# Patient Record
Sex: FEMALE | Race: WHITE | NOT HISPANIC OR LATINO
[De-identification: names, ages, dates, MRNs, and addresses within clinical notes are randomized per-mention and may not be internally consistent; named-entity substitution may affect disease eponyms.]

---

## 2017-01-12 ENCOUNTER — MEDICATION RENEWAL (OUTPATIENT)
Age: 47
End: 2017-01-12

## 2017-01-12 ENCOUNTER — RX RENEWAL (OUTPATIENT)
Age: 47
End: 2017-01-12

## 2017-01-17 ENCOUNTER — RX RENEWAL (OUTPATIENT)
Age: 47
End: 2017-01-17

## 2017-01-30 ENCOUNTER — APPOINTMENT (OUTPATIENT)
Dept: BARIATRICS/WEIGHT MGMT | Facility: CLINIC | Age: 47
End: 2017-01-30

## 2017-01-30 VITALS
HEART RATE: 114 BPM | WEIGHT: 293 LBS | SYSTOLIC BLOOD PRESSURE: 134 MMHG | DIASTOLIC BLOOD PRESSURE: 80 MMHG | HEIGHT: 66 IN | BODY MASS INDEX: 47.09 KG/M2

## 2017-02-13 ENCOUNTER — APPOINTMENT (OUTPATIENT)
Dept: SURGERY | Facility: CLINIC | Age: 47
End: 2017-02-13

## 2017-02-13 VITALS — WEIGHT: 293 LBS | HEIGHT: 64 IN | BODY MASS INDEX: 50.02 KG/M2

## 2017-03-01 ENCOUNTER — APPOINTMENT (OUTPATIENT)
Dept: BARIATRICS/WEIGHT MGMT | Facility: CLINIC | Age: 47
End: 2017-03-01

## 2017-03-01 VITALS
DIASTOLIC BLOOD PRESSURE: 80 MMHG | WEIGHT: 293 LBS | BODY MASS INDEX: 50.02 KG/M2 | HEIGHT: 64 IN | SYSTOLIC BLOOD PRESSURE: 141 MMHG | TEMPERATURE: 92 F

## 2017-03-16 ENCOUNTER — RX RENEWAL (OUTPATIENT)
Age: 47
End: 2017-03-16

## 2017-03-23 ENCOUNTER — LABORATORY RESULT (OUTPATIENT)
Age: 47
End: 2017-03-23

## 2017-03-23 ENCOUNTER — NON-APPOINTMENT (OUTPATIENT)
Age: 47
End: 2017-03-23

## 2017-03-23 ENCOUNTER — APPOINTMENT (OUTPATIENT)
Dept: INTERNAL MEDICINE | Facility: CLINIC | Age: 47
End: 2017-03-23

## 2017-03-23 ENCOUNTER — APPOINTMENT (OUTPATIENT)
Dept: CARDIOLOGY | Facility: CLINIC | Age: 47
End: 2017-03-23

## 2017-03-23 VITALS
DIASTOLIC BLOOD PRESSURE: 86 MMHG | OXYGEN SATURATION: 95 % | SYSTOLIC BLOOD PRESSURE: 136 MMHG | HEIGHT: 64 IN | WEIGHT: 293 LBS | RESPIRATION RATE: 16 BRPM | TEMPERATURE: 98 F | HEART RATE: 115 BPM | BODY MASS INDEX: 50.02 KG/M2

## 2017-03-24 LAB
ALBUMIN SERPL ELPH-MCNC: 3.8 G/DL
ALP BLD-CCNC: 89 U/L
ALT SERPL-CCNC: 21 U/L
ANION GAP SERPL CALC-SCNC: 13 MMOL/L
AST SERPL-CCNC: 17 U/L
BASOPHILS # BLD AUTO: 0.07 K/UL
BASOPHILS NFR BLD AUTO: 0.9 %
BILIRUB SERPL-MCNC: 0.2 MG/DL
BUN SERPL-MCNC: 13 MG/DL
CALCIUM SERPL-MCNC: 8.6 MG/DL
CHLORIDE SERPL-SCNC: 104 MMOL/L
CO2 SERPL-SCNC: 26 MMOL/L
CREAT SERPL-MCNC: 0.5 MG/DL
EOSINOPHIL # BLD AUTO: 0.59 K/UL
EOSINOPHIL NFR BLD AUTO: 7.3 %
GLUCOSE SERPL-MCNC: 104 MG/DL
HBA1C MFR BLD HPLC: 4.9 %
HCT VFR BLD CALC: 37.7 %
HGB BLD-MCNC: 10.5 G/DL
LYMPHOCYTES # BLD AUTO: 0.74 K/UL
LYMPHOCYTES NFR BLD AUTO: 9.1 %
MAN DIFF?: NORMAL
MCHC RBC-ENTMCNC: 24.4 PG
MCHC RBC-ENTMCNC: 27.9 GM/DL
MCV RBC AUTO: 87.5 FL
MONOCYTES # BLD AUTO: 0.45 K/UL
MONOCYTES NFR BLD AUTO: 5.5 %
NEUTROPHILS # BLD AUTO: 6.28 K/UL
NEUTROPHILS NFR BLD AUTO: 77.2 %
PLATELET # BLD AUTO: 279 K/UL
POTASSIUM SERPL-SCNC: 4.2 MMOL/L
PROT SERPL-MCNC: 7.8 G/DL
RBC # BLD: 4.31 M/UL
RBC # FLD: 16.7 %
SODIUM SERPL-SCNC: 143 MMOL/L
WBC # FLD AUTO: 8.14 K/UL

## 2017-03-29 ENCOUNTER — APPOINTMENT (OUTPATIENT)
Dept: INTERNAL MEDICINE | Facility: CLINIC | Age: 47
End: 2017-03-29

## 2017-04-03 ENCOUNTER — APPOINTMENT (OUTPATIENT)
Dept: BARIATRICS/WEIGHT MGMT | Facility: CLINIC | Age: 47
End: 2017-04-03

## 2017-04-03 VITALS
DIASTOLIC BLOOD PRESSURE: 74 MMHG | HEART RATE: 109 BPM | WEIGHT: 293 LBS | HEIGHT: 64 IN | BODY MASS INDEX: 50.02 KG/M2 | SYSTOLIC BLOOD PRESSURE: 133 MMHG

## 2017-04-04 ENCOUNTER — APPOINTMENT (OUTPATIENT)
Dept: CV DIAGNOSITCS | Facility: HOSPITAL | Age: 47
End: 2017-04-04

## 2017-04-04 ENCOUNTER — OUTPATIENT (OUTPATIENT)
Dept: OUTPATIENT SERVICES | Facility: HOSPITAL | Age: 47
LOS: 1 days | End: 2017-04-04

## 2017-04-04 DIAGNOSIS — I50.9 HEART FAILURE, UNSPECIFIED: ICD-10-CM

## 2017-04-04 DIAGNOSIS — E11.9 TYPE 2 DIABETES MELLITUS WITHOUT COMPLICATIONS: ICD-10-CM

## 2017-04-04 LAB — HCG UR QL: NEGATIVE — SIGNIFICANT CHANGE UP

## 2017-04-17 ENCOUNTER — FORM ENCOUNTER (OUTPATIENT)
Age: 47
End: 2017-04-17

## 2017-04-18 ENCOUNTER — APPOINTMENT (OUTPATIENT)
Dept: ULTRASOUND IMAGING | Facility: HOSPITAL | Age: 47
End: 2017-04-18

## 2017-04-18 ENCOUNTER — OUTPATIENT (OUTPATIENT)
Dept: OUTPATIENT SERVICES | Facility: HOSPITAL | Age: 47
LOS: 1 days | End: 2017-04-18
Payer: MEDICARE

## 2017-04-18 ENCOUNTER — APPOINTMENT (OUTPATIENT)
Dept: RADIOLOGY | Facility: HOSPITAL | Age: 47
End: 2017-04-18

## 2017-04-18 DIAGNOSIS — R10.9 UNSPECIFIED ABDOMINAL PAIN: ICD-10-CM

## 2017-04-18 DIAGNOSIS — K44.0 DIAPHRAGMATIC HERNIA WITH OBSTRUCTION, WITHOUT GANGRENE: ICD-10-CM

## 2017-04-18 DIAGNOSIS — K80.80 OTHER CHOLELITHIASIS WITHOUT OBSTRUCTION: ICD-10-CM

## 2017-04-18 PROCEDURE — 76700 US EXAM ABDOM COMPLETE: CPT

## 2017-04-18 PROCEDURE — 74241: CPT

## 2017-04-24 ENCOUNTER — MEDICATION RENEWAL (OUTPATIENT)
Age: 47
End: 2017-04-24

## 2017-05-01 ENCOUNTER — MEDICATION RENEWAL (OUTPATIENT)
Age: 47
End: 2017-05-01

## 2017-05-03 ENCOUNTER — APPOINTMENT (OUTPATIENT)
Dept: BARIATRICS/WEIGHT MGMT | Facility: CLINIC | Age: 47
End: 2017-05-03

## 2017-05-03 VITALS — BODY MASS INDEX: 66.77 KG/M2 | WEIGHT: 293 LBS

## 2017-05-03 VITALS — DIASTOLIC BLOOD PRESSURE: 80 MMHG | SYSTOLIC BLOOD PRESSURE: 104 MMHG

## 2017-05-12 ENCOUNTER — APPOINTMENT (OUTPATIENT)
Dept: INTERNAL MEDICINE | Facility: CLINIC | Age: 47
End: 2017-05-12

## 2017-05-12 VITALS
SYSTOLIC BLOOD PRESSURE: 151 MMHG | DIASTOLIC BLOOD PRESSURE: 87 MMHG | TEMPERATURE: 98 F | HEART RATE: 115 BPM | HEIGHT: 64 IN | BODY MASS INDEX: 50.02 KG/M2 | WEIGHT: 293 LBS | RESPIRATION RATE: 13 BRPM | OXYGEN SATURATION: 97 %

## 2017-05-12 DIAGNOSIS — Z82.49 FAMILY HISTORY OF ISCHEMIC HEART DISEASE AND OTHER DISEASES OF THE CIRCULATORY SYSTEM: ICD-10-CM

## 2017-05-12 DIAGNOSIS — Z87.42 PERSONAL HISTORY OF OTHER DISEASES OF THE FEMALE GENITAL TRACT: ICD-10-CM

## 2017-05-12 RX ORDER — LEVOFLOXACIN 250 MG/1
250 TABLET, FILM COATED ORAL
Qty: 5 | Refills: 0 | Status: COMPLETED | COMMUNITY
Start: 2017-01-03

## 2017-05-19 ENCOUNTER — MEDICATION RENEWAL (OUTPATIENT)
Age: 47
End: 2017-05-19

## 2017-05-22 ENCOUNTER — APPOINTMENT (OUTPATIENT)
Dept: SURGERY | Facility: CLINIC | Age: 47
End: 2017-05-22

## 2017-05-22 PROBLEM — Z82.49 FAMILY HISTORY OF CARDIAC DISORDER: Status: ACTIVE | Noted: 2017-05-12

## 2017-05-22 PROBLEM — Z87.42 HISTORY OF OVARIAN CYST: Status: RESOLVED | Noted: 2017-05-12 | Resolved: 2017-05-22

## 2017-05-25 ENCOUNTER — OUTPATIENT (OUTPATIENT)
Dept: OUTPATIENT SERVICES | Facility: HOSPITAL | Age: 47
LOS: 1 days | Discharge: HOME | End: 2017-05-25

## 2017-05-25 DIAGNOSIS — E66.01 MORBID (SEVERE) OBESITY DUE TO EXCESS CALORIES: ICD-10-CM

## 2017-05-25 LAB — HCG UR QL: NEGATIVE — SIGNIFICANT CHANGE UP

## 2017-06-02 ENCOUNTER — LABORATORY RESULT (OUTPATIENT)
Age: 47
End: 2017-06-02

## 2017-06-06 ENCOUNTER — APPOINTMENT (OUTPATIENT)
Dept: BARIATRICS/WEIGHT MGMT | Facility: CLINIC | Age: 47
End: 2017-06-06

## 2017-06-12 ENCOUNTER — LABORATORY RESULT (OUTPATIENT)
Age: 47
End: 2017-06-12

## 2017-06-22 ENCOUNTER — LABORATORY RESULT (OUTPATIENT)
Age: 47
End: 2017-06-22

## 2017-06-29 ENCOUNTER — LABORATORY RESULT (OUTPATIENT)
Age: 47
End: 2017-06-29

## 2017-07-06 ENCOUNTER — OUTPATIENT (OUTPATIENT)
Dept: OUTPATIENT SERVICES | Facility: HOSPITAL | Age: 47
LOS: 1 days | End: 2017-07-06
Payer: MEDICARE

## 2017-07-06 ENCOUNTER — RESULT REVIEW (OUTPATIENT)
Age: 47
End: 2017-07-06

## 2017-07-06 DIAGNOSIS — E66.01 MORBID (SEVERE) OBESITY DUE TO EXCESS CALORIES: ICD-10-CM

## 2017-07-06 DIAGNOSIS — R12 HEARTBURN: ICD-10-CM

## 2017-07-06 PROCEDURE — 88305 TISSUE EXAM BY PATHOLOGIST: CPT | Mod: 26

## 2017-07-06 PROCEDURE — 88305 TISSUE EXAM BY PATHOLOGIST: CPT

## 2017-07-06 PROCEDURE — 88312 SPECIAL STAINS GROUP 1: CPT | Mod: 26

## 2017-07-06 PROCEDURE — 88312 SPECIAL STAINS GROUP 1: CPT

## 2017-07-06 PROCEDURE — 43239 EGD BIOPSY SINGLE/MULTIPLE: CPT

## 2017-07-10 ENCOUNTER — APPOINTMENT (OUTPATIENT)
Dept: BARIATRICS/WEIGHT MGMT | Facility: CLINIC | Age: 47
End: 2017-07-10

## 2017-07-10 VITALS
HEART RATE: 114 BPM | BODY MASS INDEX: 50.02 KG/M2 | WEIGHT: 293 LBS | HEIGHT: 64 IN | DIASTOLIC BLOOD PRESSURE: 80 MMHG | SYSTOLIC BLOOD PRESSURE: 143 MMHG

## 2017-07-11 ENCOUNTER — LABORATORY RESULT (OUTPATIENT)
Age: 47
End: 2017-07-11

## 2017-07-11 LAB — SURGICAL PATHOLOGY STUDY: SIGNIFICANT CHANGE UP

## 2017-07-14 ENCOUNTER — LABORATORY RESULT (OUTPATIENT)
Age: 47
End: 2017-07-14

## 2017-07-14 ENCOUNTER — APPOINTMENT (OUTPATIENT)
Dept: INTERNAL MEDICINE | Facility: CLINIC | Age: 47
End: 2017-07-14

## 2017-07-14 VITALS
OXYGEN SATURATION: 97 % | DIASTOLIC BLOOD PRESSURE: 79 MMHG | HEIGHT: 64 IN | HEART RATE: 102 BPM | SYSTOLIC BLOOD PRESSURE: 129 MMHG | TEMPERATURE: 98.6 F | RESPIRATION RATE: 14 BRPM

## 2017-07-14 DIAGNOSIS — M54.5 LOW BACK PAIN: ICD-10-CM

## 2017-07-14 DIAGNOSIS — M54.12 RADICULOPATHY, CERVICAL REGION: ICD-10-CM

## 2017-07-19 LAB
25(OH)D3 SERPL-MCNC: 30 NG/ML
ALBUMIN SERPL ELPH-MCNC: 4.1 G/DL
ALP BLD-CCNC: 95 U/L
ALT SERPL-CCNC: 18 U/L
ANION GAP SERPL CALC-SCNC: 16 MMOL/L
AST SERPL-CCNC: 21 U/L
BASOPHILS # BLD AUTO: 0.1 K/UL
BASOPHILS NFR BLD AUTO: 0.9 %
BILIRUB SERPL-MCNC: 0.2 MG/DL
BUN SERPL-MCNC: 13 MG/DL
CALCIUM SERPL-MCNC: 9 MG/DL
CHLORIDE SERPL-SCNC: 105 MMOL/L
CHOLEST SERPL-MCNC: 132 MG/DL
CHOLEST/HDLC SERPL: 3.1 RATIO
CO2 SERPL-SCNC: 23 MMOL/L
CREAT SERPL-MCNC: 0.66 MG/DL
CREAT SPEC-SCNC: 156 MG/DL
EOSINOPHIL # BLD AUTO: 0.39 K/UL
EOSINOPHIL NFR BLD AUTO: 3.6 %
FOLATE SERPL-MCNC: 11 NG/ML
FRUCTOSAMINE SERPL-MCNC: 210 UMOL/L
GLUCOSE SERPL-MCNC: 87 MG/DL
HBA1C MFR BLD HPLC: 5.2 %
HCT VFR BLD CALC: 38.3 %
HDLC SERPL-MCNC: 43 MG/DL
HGB BLD-MCNC: 10.7 G/DL
IRON SATN MFR SERPL: 12 %
IRON SERPL-MCNC: 37 UG/DL
LDLC SERPL CALC-MCNC: 71 MG/DL
LYMPHOCYTES # BLD AUTO: 1.25 K/UL
LYMPHOCYTES NFR BLD AUTO: 11.6 %
MAN DIFF?: NORMAL
MCHC RBC-ENTMCNC: 24.6 PG
MCHC RBC-ENTMCNC: 27.9 GM/DL
MCV RBC AUTO: 88 FL
MICROALBUMIN 24H UR DL<=1MG/L-MCNC: 2.6 MG/DL
MICROALBUMIN/CREAT 24H UR-RTO: 17 MG/G
MONOCYTES # BLD AUTO: 0.68 K/UL
MONOCYTES NFR BLD AUTO: 6.3 %
NEUTROPHILS # BLD AUTO: 8.34 K/UL
NEUTROPHILS NFR BLD AUTO: 76.7 %
PLATELET # BLD AUTO: 329 K/UL
POTASSIUM SERPL-SCNC: 4.6 MMOL/L
PROT SERPL-MCNC: 8.3 G/DL
RBC # BLD: 4.35 M/UL
RBC # FLD: 19.7 %
SODIUM SERPL-SCNC: 144 MMOL/L
TIBC SERPL-MCNC: 319 UG/DL
TRIGL SERPL-MCNC: 88 MG/DL
TSH SERPL-ACNC: 1.62 UIU/ML
UIBC SERPL-MCNC: 282 UG/DL
VIT B12 SERPL-MCNC: 1018 PG/ML
WBC # FLD AUTO: 10.75 K/UL

## 2017-07-21 LAB
INR PPP: 1.6 RATIO
PT BLD: 18.3 SEC

## 2017-07-21 RX ORDER — DICLOFENAC SODIUM 3 G/100G
3 GEL TOPICAL TWICE DAILY
Qty: 1 | Refills: 3 | Status: DISCONTINUED | COMMUNITY
Start: 2017-07-17 | End: 2017-07-21

## 2017-07-24 ENCOUNTER — MEDICATION RENEWAL (OUTPATIENT)
Age: 47
End: 2017-07-24

## 2017-07-27 ENCOUNTER — LABORATORY RESULT (OUTPATIENT)
Age: 47
End: 2017-07-27

## 2017-08-14 ENCOUNTER — APPOINTMENT (OUTPATIENT)
Dept: BARIATRICS/WEIGHT MGMT | Facility: CLINIC | Age: 47
End: 2017-08-14
Payer: MEDICARE

## 2017-08-14 VITALS
DIASTOLIC BLOOD PRESSURE: 82 MMHG | WEIGHT: 293 LBS | HEIGHT: 64 IN | BODY MASS INDEX: 50.02 KG/M2 | SYSTOLIC BLOOD PRESSURE: 124 MMHG

## 2017-08-14 PROCEDURE — 99213 OFFICE O/P EST LOW 20 MIN: CPT

## 2017-08-22 ENCOUNTER — LABORATORY RESULT (OUTPATIENT)
Age: 47
End: 2017-08-22

## 2017-08-28 ENCOUNTER — APPOINTMENT (OUTPATIENT)
Dept: SURGERY | Facility: CLINIC | Age: 47
End: 2017-08-28
Payer: MEDICARE

## 2017-08-28 PROCEDURE — 99215 OFFICE O/P EST HI 40 MIN: CPT

## 2017-08-31 ENCOUNTER — RX RENEWAL (OUTPATIENT)
Age: 47
End: 2017-08-31

## 2017-09-01 ENCOUNTER — APPOINTMENT (OUTPATIENT)
Dept: INTERNAL MEDICINE | Facility: CLINIC | Age: 47
End: 2017-09-01
Payer: MEDICARE

## 2017-09-01 ENCOUNTER — LABORATORY RESULT (OUTPATIENT)
Age: 47
End: 2017-09-01

## 2017-09-01 VITALS
TEMPERATURE: 98.4 F | SYSTOLIC BLOOD PRESSURE: 134 MMHG | HEIGHT: 64 IN | HEART RATE: 99 BPM | RESPIRATION RATE: 14 BRPM | OXYGEN SATURATION: 94 % | DIASTOLIC BLOOD PRESSURE: 76 MMHG

## 2017-09-01 DIAGNOSIS — N92.0 EXCESSIVE AND FREQUENT MENSTRUATION WITH REGULAR CYCLE: ICD-10-CM

## 2017-09-01 DIAGNOSIS — T50.2X5A HYPOKALEMIA: ICD-10-CM

## 2017-09-01 DIAGNOSIS — M19.90 UNSPECIFIED OSTEOARTHRITIS, UNSPECIFIED SITE: ICD-10-CM

## 2017-09-01 DIAGNOSIS — E87.6 HYPOKALEMIA: ICD-10-CM

## 2017-09-01 PROCEDURE — 99215 OFFICE O/P EST HI 40 MIN: CPT

## 2017-09-06 ENCOUNTER — APPOINTMENT (OUTPATIENT)
Dept: PULMONOLOGY | Facility: CLINIC | Age: 47
End: 2017-09-06
Payer: MEDICARE

## 2017-09-06 VITALS
OXYGEN SATURATION: 95 % | HEIGHT: 64 IN | BODY MASS INDEX: 50.02 KG/M2 | WEIGHT: 293 LBS | TEMPERATURE: 98.5 F | DIASTOLIC BLOOD PRESSURE: 79 MMHG | HEART RATE: 110 BPM | RESPIRATION RATE: 16 BRPM | SYSTOLIC BLOOD PRESSURE: 140 MMHG

## 2017-09-06 DIAGNOSIS — Z82.49 FAMILY HISTORY OF ISCHEMIC HEART DISEASE AND OTHER DISEASES OF THE CIRCULATORY SYSTEM: ICD-10-CM

## 2017-09-06 DIAGNOSIS — G47.33 OBSTRUCTIVE SLEEP APNEA (ADULT) (PEDIATRIC): ICD-10-CM

## 2017-09-06 LAB
ALBUMIN SERPL ELPH-MCNC: 4 G/DL
ALP BLD-CCNC: 83 U/L
ALT SERPL-CCNC: 19 U/L
ANION GAP SERPL CALC-SCNC: 14 MMOL/L
APTT BLD: 39.6 SEC
AST SERPL-CCNC: 21 U/L
BACTERIA UR CULT: ABNORMAL
BILIRUB SERPL-MCNC: 0.2 MG/DL
BUN SERPL-MCNC: 10 MG/DL
CALCIUM SERPL-MCNC: 9.1 MG/DL
CHLORIDE SERPL-SCNC: 101 MMOL/L
CO2 SERPL-SCNC: 26 MMOL/L
CREAT SERPL-MCNC: 0.63 MG/DL
GLUCOSE SERPL-MCNC: 93 MG/DL
INR PPP: 2.14 RATIO
POTASSIUM SERPL-SCNC: 4.5 MMOL/L
PROT SERPL-MCNC: 8.5 G/DL
PT BLD: 24.6 SEC
SODIUM SERPL-SCNC: 141 MMOL/L

## 2017-09-06 PROCEDURE — 99214 OFFICE O/P EST MOD 30 MIN: CPT | Mod: 25

## 2017-09-06 PROCEDURE — 94060 EVALUATION OF WHEEZING: CPT

## 2017-09-06 PROCEDURE — 94729 DIFFUSING CAPACITY: CPT

## 2017-09-06 PROCEDURE — 94726 PLETHYSMOGRAPHY LUNG VOLUMES: CPT

## 2017-09-07 ENCOUNTER — LABORATORY RESULT (OUTPATIENT)
Age: 47
End: 2017-09-07

## 2017-09-08 LAB
APPEARANCE: CLEAR
BASOPHILS # BLD AUTO: 0.03 K/UL
BASOPHILS NFR BLD AUTO: 0.4 %
BILIRUBIN URINE: NEGATIVE
BLOOD URINE: ABNORMAL
COLOR: YELLOW
EOSINOPHIL # BLD AUTO: 0.15 K/UL
EOSINOPHIL NFR BLD AUTO: 2 %
GLUCOSE QUALITATIVE U: NORMAL MG/DL
HCT VFR BLD CALC: 39.4 %
HGB BLD-MCNC: 11.4 G/DL
IMM GRANULOCYTES NFR BLD AUTO: 0.3 %
KETONES URINE: NEGATIVE
LEUKOCYTE ESTERASE URINE: ABNORMAL
LYMPHOCYTES # BLD AUTO: 1.51 K/UL
LYMPHOCYTES NFR BLD AUTO: 19.8 %
MAN DIFF?: NORMAL
MCHC RBC-ENTMCNC: 24.9 PG
MCHC RBC-ENTMCNC: 28.9 GM/DL
MCV RBC AUTO: 86.2 FL
MONOCYTES # BLD AUTO: 0.75 K/UL
MONOCYTES NFR BLD AUTO: 9.8 %
NEUTROPHILS # BLD AUTO: 5.18 K/UL
NEUTROPHILS NFR BLD AUTO: 67.7 %
NITRITE URINE: NEGATIVE
PH URINE: 8
PLATELET # BLD AUTO: 300 K/UL
PROTEIN URINE: 30 MG/DL
RBC # BLD: 4.57 M/UL
RBC # FLD: 17.6 %
SPECIFIC GRAVITY URINE: 1.03
UROBILINOGEN URINE: NORMAL MG/DL
WBC # FLD AUTO: 7.64 K/UL

## 2017-09-08 RX ORDER — BLOOD-GLUCOSE METER
KIT MISCELLANEOUS
Qty: 1 | Refills: 0 | Status: ACTIVE | COMMUNITY
Start: 2017-09-08 | End: 1900-01-01

## 2017-09-09 RX ORDER — PENICILLIN V POTASSIUM 250 MG
1 TABLET ORAL
Qty: 0 | Refills: 0 | COMMUNITY
Start: 2017-09-09 | End: 2017-09-16

## 2017-09-12 ENCOUNTER — OUTPATIENT (OUTPATIENT)
Dept: OUTPATIENT SERVICES | Facility: HOSPITAL | Age: 47
LOS: 1 days | End: 2017-09-12
Payer: MEDICARE

## 2017-09-12 VITALS
RESPIRATION RATE: 20 BRPM | TEMPERATURE: 98 F | HEIGHT: 64 IN | DIASTOLIC BLOOD PRESSURE: 74 MMHG | OXYGEN SATURATION: 96 % | HEART RATE: 90 BPM | WEIGHT: 293 LBS | SYSTOLIC BLOOD PRESSURE: 114 MMHG

## 2017-09-12 DIAGNOSIS — E66.01 MORBID (SEVERE) OBESITY DUE TO EXCESS CALORIES: ICD-10-CM

## 2017-09-12 DIAGNOSIS — I10 ESSENTIAL (PRIMARY) HYPERTENSION: ICD-10-CM

## 2017-09-12 DIAGNOSIS — Z98.890 OTHER SPECIFIED POSTPROCEDURAL STATES: Chronic | ICD-10-CM

## 2017-09-12 DIAGNOSIS — I26.99 OTHER PULMONARY EMBOLISM WITHOUT ACUTE COR PULMONALE: ICD-10-CM

## 2017-09-12 DIAGNOSIS — G47.33 OBSTRUCTIVE SLEEP APNEA (ADULT) (PEDIATRIC): ICD-10-CM

## 2017-09-12 DIAGNOSIS — Z01.818 ENCOUNTER FOR OTHER PREPROCEDURAL EXAMINATION: ICD-10-CM

## 2017-09-12 DIAGNOSIS — E11.9 TYPE 2 DIABETES MELLITUS WITHOUT COMPLICATIONS: ICD-10-CM

## 2017-09-12 LAB
BLD GP AB SCN SERPL QL: NEGATIVE — SIGNIFICANT CHANGE UP
HBA1C BLD-MCNC: 4.9 % — SIGNIFICANT CHANGE UP (ref 4–5.6)
RH IG SCN BLD-IMP: POSITIVE — SIGNIFICANT CHANGE UP

## 2017-09-12 PROCEDURE — 86901 BLOOD TYPING SEROLOGIC RH(D): CPT

## 2017-09-12 PROCEDURE — 86850 RBC ANTIBODY SCREEN: CPT

## 2017-09-12 PROCEDURE — G0463: CPT

## 2017-09-12 PROCEDURE — 83036 HEMOGLOBIN GLYCOSYLATED A1C: CPT

## 2017-09-12 PROCEDURE — 86900 BLOOD TYPING SEROLOGIC ABO: CPT

## 2017-09-12 RX ORDER — DULOXETINE HYDROCHLORIDE 30 MG/1
1 CAPSULE, DELAYED RELEASE ORAL
Qty: 0 | Refills: 0 | COMMUNITY

## 2017-09-12 RX ORDER — LEVOTHYROXINE SODIUM 125 MCG
1 TABLET ORAL
Qty: 0 | Refills: 0 | COMMUNITY

## 2017-09-12 NOTE — H&P PST ADULT - PROBLEM SELECTOR PLAN 1
Laparoscopic Vertical Sleeve Gastrectomy    Pt HT, WT, & BMI e-mailed to Raine hernandez, Kaitlin Solorzano & Mally Ledezma

## 2017-09-12 NOTE — H&P PST ADULT - NEUROLOGICAL DETAILS
strength decreased/sensation intact/deep reflexes intact/responds to verbal commands/responds to pain/alert and oriented x 3

## 2017-09-12 NOTE — H&P PST ADULT - HISTORY OF PRESENT ILLNESS
47 yr old female with h/o TYPE 2 DM, HTN , severe Neuropathy of legs, PE ( 2014) - March & July 2014- On coumadin, DVT ( March 2014), YVONNE severe on CPAP, Anemia due to menorrhagia - on ferrous sulphate, Morbid Obesity ( BMI-66) , Now coming in for Laparoscopic vertical sleeve gastrectomy on 9/20/17.

## 2017-09-12 NOTE — H&P PST ADULT - RS GEN PE MLT RESP DETAILS PC
breath sounds equal/clear to auscultation bilaterally/respirations non-labored/good air movement/normal/airway patent

## 2017-09-12 NOTE — H&P PST ADULT - PMH
Anemia    DVT (deep venous thrombosis)  2014  GERD (gastroesophageal reflux disease)    Hyperlipidemia    Hypertension    Hypothyroidism    Menorrhagia    Morbid obesity    Neuropathy    YVONNE on CPAP  severe  Osteoarthritis    Pulmonary embolism  2014 x 2 -1 in march, 1 in July - on coumadin  Type 2 diabetes mellitus    Vertigo Anemia    DVT (deep venous thrombosis)  2014  GERD (gastroesophageal reflux disease)    Hyperlipidemia    Hypertension    Hypothyroidism    Menorrhagia    Morbid obesity with BMI of 60.0-69.9, adult    Neuropathy    YVONNE on CPAP  severe  Osteoarthritis    Pulmonary embolism  2014 x 2 -1 in march, 1 in July - on coumadin  Type 2 diabetes mellitus    UTI (urinary tract infection)  u c/s- 9/7/17- On pencillin from 9/8/17-9/15/17  Vertigo

## 2017-09-12 NOTE — H&P PST ADULT - VENOUS THROMBOEMBOLISM CURRENT STATUS
major surgery, including arthroscopic and laparoscopic (greater than 1 hr) swollen legs/major surgery lasting 2-3 hrs

## 2017-09-13 ENCOUNTER — OUTPATIENT (OUTPATIENT)
Dept: OUTPATIENT SERVICES | Facility: HOSPITAL | Age: 47
LOS: 1 days | Discharge: HOME | End: 2017-09-13

## 2017-09-13 DIAGNOSIS — D64.9 ANEMIA, UNSPECIFIED: ICD-10-CM

## 2017-09-13 DIAGNOSIS — Z98.890 OTHER SPECIFIED POSTPROCEDURAL STATES: Chronic | ICD-10-CM

## 2017-09-14 ENCOUNTER — RESULT REVIEW (OUTPATIENT)
Age: 47
End: 2017-09-14

## 2017-09-14 ENCOUNTER — APPOINTMENT (OUTPATIENT)
Dept: HEMATOLOGY ONCOLOGY | Facility: CLINIC | Age: 47
End: 2017-09-14

## 2017-09-14 ENCOUNTER — RX RENEWAL (OUTPATIENT)
Age: 47
End: 2017-09-14

## 2017-09-14 ENCOUNTER — OUTPATIENT (OUTPATIENT)
Dept: OUTPATIENT SERVICES | Facility: HOSPITAL | Age: 47
LOS: 1 days | End: 2017-09-14
Payer: MEDICARE

## 2017-09-14 VITALS
DIASTOLIC BLOOD PRESSURE: 77 MMHG | BODY MASS INDEX: 66.77 KG/M2 | OXYGEN SATURATION: 98 % | RESPIRATION RATE: 16 BRPM | SYSTOLIC BLOOD PRESSURE: 122 MMHG | HEART RATE: 101 BPM | TEMPERATURE: 98.9 F | WEIGHT: 293 LBS

## 2017-09-14 DIAGNOSIS — Z98.890 OTHER SPECIFIED POSTPROCEDURAL STATES: Chronic | ICD-10-CM

## 2017-09-14 DIAGNOSIS — M54.12 RADICULOPATHY, CERVICAL REGION: ICD-10-CM

## 2017-09-14 DIAGNOSIS — D68.59 OTHER PRIMARY THROMBOPHILIA: ICD-10-CM

## 2017-09-14 LAB
BASOPHILS # BLD AUTO: 0.1 K/UL — SIGNIFICANT CHANGE UP (ref 0–0.2)
BASOPHILS NFR BLD AUTO: 1.7 % — SIGNIFICANT CHANGE UP (ref 0–2)
EOSINOPHIL # BLD AUTO: 0.2 K/UL — SIGNIFICANT CHANGE UP (ref 0–0.5)
EOSINOPHIL NFR BLD AUTO: 2.5 % — SIGNIFICANT CHANGE UP (ref 0–6)
LYMPHOCYTES # BLD AUTO: 1.5 K/UL — SIGNIFICANT CHANGE UP (ref 1–3.3)
LYMPHOCYTES # BLD AUTO: 18.4 % — SIGNIFICANT CHANGE UP (ref 13–44)
MONOCYTES # BLD AUTO: 0.7 K/UL — SIGNIFICANT CHANGE UP (ref 0–0.9)
MONOCYTES NFR BLD AUTO: 8.4 % — SIGNIFICANT CHANGE UP (ref 2–14)
NEUTROPHILS # BLD AUTO: 5.5 K/UL — SIGNIFICANT CHANGE UP (ref 1.8–7.4)
NEUTROPHILS NFR BLD AUTO: 69 % — SIGNIFICANT CHANGE UP (ref 43–77)
RETICS #: 90.1 K/UL — SIGNIFICANT CHANGE UP (ref 25–125)
RETICS/RBC NFR: 1.8 % — SIGNIFICANT CHANGE UP (ref 0.5–2.5)

## 2017-09-14 PROCEDURE — G0452: CPT | Mod: 26

## 2017-09-14 PROCEDURE — 81240 F2 GENE: CPT

## 2017-09-14 PROCEDURE — 81291 MTHFR GENE: CPT

## 2017-09-15 ENCOUNTER — APPOINTMENT (OUTPATIENT)
Dept: SURGERY | Facility: CLINIC | Age: 47
End: 2017-09-15
Payer: MEDICARE

## 2017-09-15 DIAGNOSIS — D68.59 OTHER PRIMARY THROMBOPHILIA: ICD-10-CM

## 2017-09-15 LAB
APTT 50/50 2HOUR INCUB: 34.7 SEC — SIGNIFICANT CHANGE UP (ref 27.5–37.4)
APTT BLD: 31.9 SEC — SIGNIFICANT CHANGE UP (ref 27.5–37.4)
APTT BLD: 64.2 SEC — HIGH (ref 27.5–37.4)
APTT BLD: 64.2 SEC — HIGH (ref 27.5–37.4)
DRVVT 50/50: 34.1 SEC — SIGNIFICANT CHANGE UP
DRVVT SCREEN TO CONFIRM RATIO: SIGNIFICANT CHANGE UP
LA NT DPL PPP QL: 65.4 SEC — SIGNIFICANT CHANGE UP
NORMALIZED SCT PPP-RTO: 1.03 RATIO — SIGNIFICANT CHANGE UP (ref 0–1.16)
NORMALIZED SCT PPP-RTO: SIGNIFICANT CHANGE UP
PAT CTL 2H: 34.2 SEC — SIGNIFICANT CHANGE UP (ref 27.5–37.4)

## 2017-09-16 ENCOUNTER — RX RENEWAL (OUTPATIENT)
Age: 47
End: 2017-09-16

## 2017-09-18 ENCOUNTER — APPOINTMENT (OUTPATIENT)
Dept: SURGERY | Facility: CLINIC | Age: 47
End: 2017-09-18
Payer: MEDICARE

## 2017-09-18 ENCOUNTER — OUTPATIENT (OUTPATIENT)
Dept: OUTPATIENT SERVICES | Facility: HOSPITAL | Age: 47
LOS: 1 days | Discharge: HOME | End: 2017-09-18

## 2017-09-18 VITALS
HEIGHT: 64 IN | DIASTOLIC BLOOD PRESSURE: 90 MMHG | SYSTOLIC BLOOD PRESSURE: 130 MMHG | TEMPERATURE: 98.6 F | HEART RATE: 100 BPM | RESPIRATION RATE: 18 BRPM | WEIGHT: 293 LBS | OXYGEN SATURATION: 99 % | BODY MASS INDEX: 50.02 KG/M2

## 2017-09-18 DIAGNOSIS — Z98.890 OTHER SPECIFIED POSTPROCEDURAL STATES: Chronic | ICD-10-CM

## 2017-09-18 DIAGNOSIS — Z79.01 LONG TERM (CURRENT) USE OF ANTICOAGULANTS: ICD-10-CM

## 2017-09-18 LAB
APTT BLD: 45.7 SEC — HIGH (ref 27.5–37.4)
INR BLD: 2.63 RATIO — HIGH (ref 0.88–1.16)
PROTHROM AB SERPL-ACNC: 29.2 SEC — HIGH (ref 9.8–12.7)

## 2017-09-18 PROCEDURE — 99214 OFFICE O/P EST MOD 30 MIN: CPT

## 2017-09-19 LAB
MTHFR GENE INTERPRETATION: SIGNIFICANT CHANGE UP
PTR INTERPRETATION: SIGNIFICANT CHANGE UP

## 2017-09-20 ENCOUNTER — APPOINTMENT (OUTPATIENT)
Dept: SURGERY | Facility: HOSPITAL | Age: 47
End: 2017-09-20
Payer: MEDICARE

## 2017-09-22 ENCOUNTER — APPOINTMENT (OUTPATIENT)
Dept: INTERNAL MEDICINE | Facility: CLINIC | Age: 47
End: 2017-09-22
Payer: MEDICARE

## 2017-09-22 VITALS
TEMPERATURE: 98.6 F | WEIGHT: 293 LBS | OXYGEN SATURATION: 98 % | DIASTOLIC BLOOD PRESSURE: 72 MMHG | HEIGHT: 64 IN | SYSTOLIC BLOOD PRESSURE: 117 MMHG | BODY MASS INDEX: 50.02 KG/M2 | RESPIRATION RATE: 14 BRPM | HEART RATE: 103 BPM

## 2017-09-22 PROCEDURE — 99213 OFFICE O/P EST LOW 20 MIN: CPT

## 2017-09-28 ENCOUNTER — APPOINTMENT (OUTPATIENT)
Dept: VASCULAR SURGERY | Facility: CLINIC | Age: 47
End: 2017-09-28
Payer: MEDICARE

## 2017-09-28 VITALS — HEIGHT: 64 IN | BODY MASS INDEX: 50.02 KG/M2 | WEIGHT: 293 LBS | RESPIRATION RATE: 16 BRPM | TEMPERATURE: 98.6 F

## 2017-09-28 VITALS — DIASTOLIC BLOOD PRESSURE: 88 MMHG | SYSTOLIC BLOOD PRESSURE: 142 MMHG | HEART RATE: 101 BPM

## 2017-09-28 PROCEDURE — 99204 OFFICE O/P NEW MOD 45 MIN: CPT

## 2017-10-03 ENCOUNTER — RESULT REVIEW (OUTPATIENT)
Age: 47
End: 2017-10-03

## 2017-10-03 ENCOUNTER — LABORATORY RESULT (OUTPATIENT)
Age: 47
End: 2017-10-03

## 2017-10-04 ENCOUNTER — LABORATORY RESULT (OUTPATIENT)
Age: 47
End: 2017-10-04

## 2017-10-05 ENCOUNTER — APPOINTMENT (OUTPATIENT)
Dept: HEMATOLOGY ONCOLOGY | Facility: CLINIC | Age: 47
End: 2017-10-05

## 2017-10-06 ENCOUNTER — APPOINTMENT (OUTPATIENT)
Age: 47
End: 2017-10-06

## 2017-10-09 ENCOUNTER — APPOINTMENT (OUTPATIENT)
Age: 47
End: 2017-10-09

## 2017-10-09 ENCOUNTER — LABORATORY RESULT (OUTPATIENT)
Age: 47
End: 2017-10-09

## 2017-10-17 ENCOUNTER — LABORATORY RESULT (OUTPATIENT)
Age: 47
End: 2017-10-17

## 2017-10-20 ENCOUNTER — LABORATORY RESULT (OUTPATIENT)
Age: 47
End: 2017-10-20

## 2017-10-23 ENCOUNTER — LABORATORY RESULT (OUTPATIENT)
Age: 47
End: 2017-10-23

## 2017-10-24 ENCOUNTER — RESULT CHARGE (OUTPATIENT)
Age: 47
End: 2017-10-24

## 2017-10-24 ENCOUNTER — OUTPATIENT (OUTPATIENT)
Dept: OUTPATIENT SERVICES | Facility: HOSPITAL | Age: 47
LOS: 1 days | End: 2017-10-24
Payer: MEDICARE

## 2017-10-24 VITALS
HEART RATE: 96 BPM | OXYGEN SATURATION: 98 % | SYSTOLIC BLOOD PRESSURE: 119 MMHG | TEMPERATURE: 99 F | RESPIRATION RATE: 18 BRPM | HEIGHT: 64 IN | WEIGHT: 293 LBS | DIASTOLIC BLOOD PRESSURE: 79 MMHG

## 2017-10-24 DIAGNOSIS — Z01.818 ENCOUNTER FOR OTHER PREPROCEDURAL EXAMINATION: ICD-10-CM

## 2017-10-24 DIAGNOSIS — G47.33 OBSTRUCTIVE SLEEP APNEA (ADULT) (PEDIATRIC): ICD-10-CM

## 2017-10-24 DIAGNOSIS — Z98.890 OTHER SPECIFIED POSTPROCEDURAL STATES: Chronic | ICD-10-CM

## 2017-10-24 DIAGNOSIS — Z90.721 ACQUIRED ABSENCE OF OVARIES, UNILATERAL: Chronic | ICD-10-CM

## 2017-10-24 DIAGNOSIS — E11.9 TYPE 2 DIABETES MELLITUS WITHOUT COMPLICATIONS: ICD-10-CM

## 2017-10-24 DIAGNOSIS — E66.01 MORBID (SEVERE) OBESITY DUE TO EXCESS CALORIES: ICD-10-CM

## 2017-10-24 DIAGNOSIS — Z86.711 PERSONAL HISTORY OF PULMONARY EMBOLISM: ICD-10-CM

## 2017-10-24 LAB
ALBUMIN SERPL ELPH-MCNC: 3.9 G/DL — SIGNIFICANT CHANGE UP (ref 3.3–5)
ALP SERPL-CCNC: 94 U/L — SIGNIFICANT CHANGE UP (ref 40–120)
ALT FLD-CCNC: 17 U/L — SIGNIFICANT CHANGE UP (ref 10–45)
ANION GAP SERPL CALC-SCNC: 15 MMOL/L — SIGNIFICANT CHANGE UP (ref 5–17)
AST SERPL-CCNC: 24 U/L — SIGNIFICANT CHANGE UP (ref 10–40)
BILIRUB SERPL-MCNC: 0.3 MG/DL — SIGNIFICANT CHANGE UP (ref 0.2–1.2)
BLD GP AB SCN SERPL QL: NEGATIVE — SIGNIFICANT CHANGE UP
BUN SERPL-MCNC: 7 MG/DL — SIGNIFICANT CHANGE UP (ref 7–23)
CALCIUM SERPL-MCNC: 9.3 MG/DL — SIGNIFICANT CHANGE UP (ref 8.4–10.5)
CHLORIDE SERPL-SCNC: 100 MMOL/L — SIGNIFICANT CHANGE UP (ref 96–108)
CO2 SERPL-SCNC: 25 MMOL/L — SIGNIFICANT CHANGE UP (ref 22–31)
CREAT SERPL-MCNC: 0.58 MG/DL — SIGNIFICANT CHANGE UP (ref 0.5–1.3)
GLUCOSE SERPL-MCNC: 93 MG/DL — SIGNIFICANT CHANGE UP (ref 70–99)
HCT VFR BLD CALC: 40.9 % — SIGNIFICANT CHANGE UP (ref 34.5–45)
HGB BLD-MCNC: 12 G/DL — SIGNIFICANT CHANGE UP (ref 11.5–15.5)
MCHC RBC-ENTMCNC: 24.4 PG — LOW (ref 27–34)
MCHC RBC-ENTMCNC: 29.3 GM/DL — LOW (ref 32–36)
MCV RBC AUTO: 83.1 FL — SIGNIFICANT CHANGE UP (ref 80–100)
PLATELET # BLD AUTO: 293 K/UL — SIGNIFICANT CHANGE UP (ref 150–400)
POTASSIUM SERPL-MCNC: 4.8 MMOL/L — SIGNIFICANT CHANGE UP (ref 3.5–5.3)
POTASSIUM SERPL-SCNC: 4.8 MMOL/L — SIGNIFICANT CHANGE UP (ref 3.5–5.3)
PROT SERPL-MCNC: 9 G/DL — HIGH (ref 6–8.3)
RBC # BLD: 4.92 M/UL — SIGNIFICANT CHANGE UP (ref 3.8–5.2)
RBC # FLD: 15.8 % — HIGH (ref 10.3–14.5)
RH IG SCN BLD-IMP: POSITIVE — SIGNIFICANT CHANGE UP
SODIUM SERPL-SCNC: 140 MMOL/L — SIGNIFICANT CHANGE UP (ref 135–145)
WBC # BLD: 6.15 K/UL — SIGNIFICANT CHANGE UP (ref 3.8–10.5)
WBC # FLD AUTO: 6.15 K/UL — SIGNIFICANT CHANGE UP (ref 3.8–10.5)

## 2017-10-24 PROCEDURE — 80053 COMPREHEN METABOLIC PANEL: CPT

## 2017-10-24 PROCEDURE — 86850 RBC ANTIBODY SCREEN: CPT

## 2017-10-24 PROCEDURE — 86900 BLOOD TYPING SEROLOGIC ABO: CPT

## 2017-10-24 PROCEDURE — 86901 BLOOD TYPING SEROLOGIC RH(D): CPT

## 2017-10-24 PROCEDURE — G0463: CPT

## 2017-10-24 PROCEDURE — 85027 COMPLETE CBC AUTOMATED: CPT

## 2017-10-24 RX ORDER — BUPROPION HYDROCHLORIDE 150 MG/1
1 TABLET, EXTENDED RELEASE ORAL
Qty: 0 | Refills: 0 | COMMUNITY

## 2017-10-24 RX ORDER — CEFAZOLIN SODIUM 1 G
3000 VIAL (EA) INJECTION ONCE
Qty: 0 | Refills: 0 | Status: DISCONTINUED | OUTPATIENT
Start: 2017-11-01 | End: 2017-11-02

## 2017-10-24 NOTE — H&P PST ADULT - PROBLEM SELECTOR PLAN 4
DVT prophylaxis  Patient is off coumadin since 10/17/17, pending IVC filter prior surgery  I will email Dr. Lara and Dr. White to follow up

## 2017-10-24 NOTE — H&P PST ADULT - PROBLEM SELECTOR PLAN 1
Laparoscopic vertical sleeve gastrectomy for morbid obesity  PST instructions provided, soap given, patient verbalized understanding.

## 2017-10-24 NOTE — H&P PST ADULT - HISTORY OF PRESENT ILLNESS
47 yr old female with h/o TYPE 2 DM, HTN , severe Neuropathy of legs, PE ( 2014) - March & July 2014- On coumadin, DVT ( March 2014), YVONNE severe on CPAP, Anemia due to menorrhagia - on ferrous sulphate, Morbid Obesity ( BMI-66) , Now coming in for Laparoscopic vertical sleeve gastrectomy on 9/20/17. 47 yr old morbidly obese female, BMI 62,  with  HTN , HLD, controlled DM2, Diabetic Peripheral Neuropathy, h/o PE/DVT ( 2014)  On coumadin, YVONNE on CPAP, Anemia, hypothyroidism, GERD,   presents to PST for scheduled Laparoscopic vertical sleeve gastrectomy on 11/1/17.    Initially procedure was scheduled for 9/20/17, it was postponed due to complex pre op evaluation. Patient completed cardiac, pulm, heme consultations (notes in [employer]). Was scheduled to have IVC filer placed prior gastrectomy by Dr. Lara, pt's  INR level was high and procedure was canceled.  Patient off Coumadin since 10/17/17.   INR 1.17 ( 10/23/17) , patient will contact Dr. Lara to schedule an appointment for IVC filter placement ( I emailed Dr. Lara to follow up)     Patient being on full liquid diet since early September, lost 26 lbs.

## 2017-10-24 NOTE — H&P PST ADULT - PMH
Diabetic peripheral neuropathy    DVT (deep venous thrombosis)  2014  GERD (gastroesophageal reflux disease)    Hyperlipidemia    Hypertension    Hypothyroidism    Iron deficiency anemia    Menorrhagia  improved  Morbid obesity with BMI of 60.0-69.9, adult    Neuropathy    YVONNE on CPAP  severe, not using CPAP  Osteoarthritis    Pulmonary embolism  2014 x 2 -1 in march, 1 in July - on coumadin  Type 2 diabetes mellitus  Hg A1C 4.9 %  ( 9/12/17), diagnosed in 2011  UTI (urinary tract infection)  u c/s- 9/7/17- On pencillin from 9/8/17-9/15/17  Vertigo Diabetic peripheral neuropathy  severe  GERD (gastroesophageal reflux disease)    History of DVT (deep vein thrombosis)  2014  History of pulmonary embolism  2014 x 2 -1 in march, 1 in July - on coumadin  Hyperlipidemia    Hypertension    Hypothyroidism    Iron deficiency anemia    Menorrhagia  improved  Morbid obesity with BMI of 60.0-69.9, adult  BMI 62  YVONNE on CPAP  severe, not using CPAP  Osteoarthritis    Type 2 diabetes mellitus  Hg A1C 4.9 %  ( 9/12/17), diagnosed in 2011  UTI (urinary tract infection)  9/1/17 positive urine cx, was treatead with  pencillin from 9/8/17-9/15/17  Vertigo  chronic, without changes. Patient was evaluated in the past, etiology unknown.

## 2017-10-24 NOTE — H&P PST ADULT - OTHER CARE PROVIDERS
Dr. Aury Burrows [phone], Dr. Dai ( pulm) [phone], Dr. Orozco ( neuro) [phone], heme Dr. Yamileth Morocho Dr. Aury Burrows [phone], Dr. Dai ( pulm) [phone], Dr. Orozco ( neuro) [phone], heme Dr. Yamileth Morocho, vascular Dr. MARCELA Lara

## 2017-10-24 NOTE — H&P PST ADULT - PSH
S/P D&C (status post dilation and curettage)    S/P ovarian cystectomy  right 1999 History of oophorectomy, unilateral  right 1999  S/P D&C (status post dilation and curettage)

## 2017-10-25 ENCOUNTER — APPOINTMENT (OUTPATIENT)
Dept: CARDIOLOGY | Facility: CLINIC | Age: 47
End: 2017-10-25
Payer: MEDICARE

## 2017-10-25 ENCOUNTER — APPOINTMENT (OUTPATIENT)
Dept: INTERNAL MEDICINE | Facility: CLINIC | Age: 47
End: 2017-10-25
Payer: MEDICARE

## 2017-10-25 ENCOUNTER — NON-APPOINTMENT (OUTPATIENT)
Age: 47
End: 2017-10-25

## 2017-10-25 VITALS
TEMPERATURE: 98.2 F | DIASTOLIC BLOOD PRESSURE: 85 MMHG | BODY MASS INDEX: 50.02 KG/M2 | RESPIRATION RATE: 13 BRPM | WEIGHT: 293 LBS | SYSTOLIC BLOOD PRESSURE: 136 MMHG | OXYGEN SATURATION: 97 % | HEART RATE: 111 BPM | HEIGHT: 64 IN

## 2017-10-25 DIAGNOSIS — Z01.818 ENCOUNTER FOR OTHER PREPROCEDURAL EXAMINATION: ICD-10-CM

## 2017-10-25 DIAGNOSIS — E66.9 OBESITY, UNSPECIFIED: ICD-10-CM

## 2017-10-25 PROCEDURE — 85610 PROTHROMBIN TIME: CPT | Mod: QW

## 2017-10-25 PROCEDURE — 99215 OFFICE O/P EST HI 40 MIN: CPT | Mod: 25

## 2017-10-25 PROCEDURE — 93000 ELECTROCARDIOGRAM COMPLETE: CPT

## 2017-10-25 PROCEDURE — 99214 OFFICE O/P EST MOD 30 MIN: CPT

## 2017-10-26 ENCOUNTER — FORM ENCOUNTER (OUTPATIENT)
Age: 47
End: 2017-10-26

## 2017-10-27 ENCOUNTER — APPOINTMENT (OUTPATIENT)
Age: 47
End: 2017-10-27
Payer: MEDICARE

## 2017-10-27 LAB
INR PPP: 1.1 RATIO
POCT-PROTHROMBIN TIME: 13.3 SECS

## 2017-10-27 PROCEDURE — 37191Z: CUSTOM

## 2017-10-29 PROBLEM — Z01.818 PRE-OPERATIVE EXAM: Status: ACTIVE | Noted: 2017-09-01

## 2017-10-29 PROBLEM — E66.9 ADULT-ONSET OBESITY: Noted: 2017-04-03

## 2017-11-01 ENCOUNTER — RESULT REVIEW (OUTPATIENT)
Age: 47
End: 2017-11-01

## 2017-11-01 ENCOUNTER — TRANSCRIPTION ENCOUNTER (OUTPATIENT)
Age: 47
End: 2017-11-01

## 2017-11-01 ENCOUNTER — INPATIENT (INPATIENT)
Facility: HOSPITAL | Age: 47
LOS: 0 days | Discharge: HOME HEALTH | DRG: 621 | End: 2017-11-02
Attending: SURGERY | Admitting: SURGERY
Payer: MEDICARE

## 2017-11-01 ENCOUNTER — APPOINTMENT (OUTPATIENT)
Dept: SURGERY | Facility: HOSPITAL | Age: 47
End: 2017-11-01
Payer: MEDICARE

## 2017-11-01 VITALS
HEIGHT: 64 IN | HEART RATE: 96 BPM | WEIGHT: 293 LBS | OXYGEN SATURATION: 97 % | DIASTOLIC BLOOD PRESSURE: 85 MMHG | TEMPERATURE: 98 F | SYSTOLIC BLOOD PRESSURE: 129 MMHG | RESPIRATION RATE: 18 BRPM

## 2017-11-01 DIAGNOSIS — Z90.721 ACQUIRED ABSENCE OF OVARIES, UNILATERAL: Chronic | ICD-10-CM

## 2017-11-01 DIAGNOSIS — Z98.890 OTHER SPECIFIED POSTPROCEDURAL STATES: Chronic | ICD-10-CM

## 2017-11-01 DIAGNOSIS — Z01.818 ENCOUNTER FOR OTHER PREPROCEDURAL EXAMINATION: ICD-10-CM

## 2017-11-01 DIAGNOSIS — E66.01 MORBID (SEVERE) OBESITY DUE TO EXCESS CALORIES: ICD-10-CM

## 2017-11-01 LAB
ANION GAP SERPL CALC-SCNC: 12 MMOL/L — SIGNIFICANT CHANGE UP (ref 5–17)
BASOPHILS # BLD AUTO: 0 K/UL — SIGNIFICANT CHANGE UP (ref 0–0.2)
BASOPHILS NFR BLD AUTO: 0 % — SIGNIFICANT CHANGE UP (ref 0–2)
BUN SERPL-MCNC: 8 MG/DL — SIGNIFICANT CHANGE UP (ref 7–23)
CALCIUM SERPL-MCNC: 8.5 MG/DL — SIGNIFICANT CHANGE UP (ref 8.4–10.5)
CHLORIDE SERPL-SCNC: 99 MMOL/L — SIGNIFICANT CHANGE UP (ref 96–108)
CO2 SERPL-SCNC: 25 MMOL/L — SIGNIFICANT CHANGE UP (ref 22–31)
CREAT SERPL-MCNC: 0.58 MG/DL — SIGNIFICANT CHANGE UP (ref 0.5–1.3)
EOSINOPHIL # BLD AUTO: 0 K/UL — SIGNIFICANT CHANGE UP (ref 0–0.5)
EOSINOPHIL NFR BLD AUTO: 0.5 % — SIGNIFICANT CHANGE UP (ref 0–6)
GLUCOSE BLDC GLUCOMTR-MCNC: 95 MG/DL — SIGNIFICANT CHANGE UP (ref 70–99)
GLUCOSE SERPL-MCNC: 150 MG/DL — HIGH (ref 70–99)
HCG UR QL: NEGATIVE — SIGNIFICANT CHANGE UP
HCT VFR BLD CALC: 37.3 % — SIGNIFICANT CHANGE UP (ref 34.5–45)
HGB BLD-MCNC: 11.5 G/DL — SIGNIFICANT CHANGE UP (ref 11.5–15.5)
INR BLD: 1.16 RATIO — SIGNIFICANT CHANGE UP (ref 0.88–1.16)
LYMPHOCYTES # BLD AUTO: 0.6 K/UL — LOW (ref 1–3.3)
LYMPHOCYTES # BLD AUTO: 6.4 % — LOW (ref 13–44)
MAGNESIUM SERPL-MCNC: 2.2 MG/DL — SIGNIFICANT CHANGE UP (ref 1.6–2.6)
MCHC RBC-ENTMCNC: 25.5 PG — LOW (ref 27–34)
MCHC RBC-ENTMCNC: 31 GM/DL — LOW (ref 32–36)
MCV RBC AUTO: 82.3 FL — SIGNIFICANT CHANGE UP (ref 80–100)
MONOCYTES # BLD AUTO: 0.3 K/UL — SIGNIFICANT CHANGE UP (ref 0–0.9)
MONOCYTES NFR BLD AUTO: 3.5 % — SIGNIFICANT CHANGE UP (ref 2–14)
NEUTROPHILS # BLD AUTO: 8.3 K/UL — HIGH (ref 1.8–7.4)
NEUTROPHILS NFR BLD AUTO: 89.6 % — HIGH (ref 43–77)
PHOSPHATE SERPL-MCNC: 2.4 MG/DL — LOW (ref 2.5–4.5)
PLATELET # BLD AUTO: 193 K/UL — SIGNIFICANT CHANGE UP (ref 150–400)
POTASSIUM SERPL-MCNC: 3.7 MMOL/L — SIGNIFICANT CHANGE UP (ref 3.5–5.3)
POTASSIUM SERPL-SCNC: 3.7 MMOL/L — SIGNIFICANT CHANGE UP (ref 3.5–5.3)
PROTHROM AB SERPL-ACNC: 12.7 SEC — SIGNIFICANT CHANGE UP (ref 9.8–12.7)
RBC # BLD: 4.53 M/UL — SIGNIFICANT CHANGE UP (ref 3.8–5.2)
RBC # FLD: 15.5 % — HIGH (ref 10.3–14.5)
SODIUM SERPL-SCNC: 136 MMOL/L — SIGNIFICANT CHANGE UP (ref 135–145)
WBC # BLD: 9.2 K/UL — SIGNIFICANT CHANGE UP (ref 3.8–10.5)
WBC # FLD AUTO: 9.2 K/UL — SIGNIFICANT CHANGE UP (ref 3.8–10.5)

## 2017-11-01 PROCEDURE — 43775 LAP SLEEVE GASTRECTOMY: CPT

## 2017-11-01 PROCEDURE — 88305 TISSUE EXAM BY PATHOLOGIST: CPT | Mod: 26

## 2017-11-01 RX ORDER — SODIUM CHLORIDE 9 MG/ML
3 INJECTION INTRAMUSCULAR; INTRAVENOUS; SUBCUTANEOUS EVERY 8 HOURS
Qty: 0 | Refills: 0 | Status: DISCONTINUED | OUTPATIENT
Start: 2017-11-01 | End: 2017-11-01

## 2017-11-01 RX ORDER — CEFAZOLIN SODIUM 1 G
3000 VIAL (EA) INJECTION EVERY 8 HOURS
Qty: 0 | Refills: 0 | Status: COMPLETED | OUTPATIENT
Start: 2017-11-01 | End: 2017-11-01

## 2017-11-01 RX ORDER — ACETAMINOPHEN 500 MG
1000 TABLET ORAL ONCE
Qty: 0 | Refills: 0 | Status: COMPLETED | OUTPATIENT
Start: 2017-11-02 | End: 2017-11-02

## 2017-11-01 RX ORDER — ONDANSETRON 8 MG/1
4 TABLET, FILM COATED ORAL EVERY 6 HOURS
Qty: 0 | Refills: 0 | Status: DISCONTINUED | OUTPATIENT
Start: 2017-11-01 | End: 2017-11-02

## 2017-11-01 RX ORDER — GLUCAGON INJECTION, SOLUTION 0.5 MG/.1ML
1 INJECTION, SOLUTION SUBCUTANEOUS ONCE
Qty: 0 | Refills: 0 | Status: DISCONTINUED | OUTPATIENT
Start: 2017-11-01 | End: 2017-11-02

## 2017-11-01 RX ORDER — DEXTROSE 50 % IN WATER 50 %
1 SYRINGE (ML) INTRAVENOUS ONCE
Qty: 0 | Refills: 0 | Status: DISCONTINUED | OUTPATIENT
Start: 2017-11-01 | End: 2017-11-02

## 2017-11-01 RX ORDER — HEPARIN SODIUM 5000 [USP'U]/ML
7500 INJECTION INTRAVENOUS; SUBCUTANEOUS EVERY 12 HOURS
Qty: 0 | Refills: 0 | Status: DISCONTINUED | OUTPATIENT
Start: 2017-11-01 | End: 2017-11-02

## 2017-11-01 RX ORDER — SODIUM CHLORIDE 9 MG/ML
1000 INJECTION, SOLUTION INTRAVENOUS
Qty: 0 | Refills: 0 | Status: DISCONTINUED | OUTPATIENT
Start: 2017-11-01 | End: 2017-11-02

## 2017-11-01 RX ORDER — ONDANSETRON 8 MG/1
8 TABLET, FILM COATED ORAL ONCE
Qty: 0 | Refills: 0 | Status: COMPLETED | OUTPATIENT
Start: 2017-11-01 | End: 2017-11-01

## 2017-11-01 RX ORDER — DEXTROSE 50 % IN WATER 50 %
12.5 SYRINGE (ML) INTRAVENOUS ONCE
Qty: 0 | Refills: 0 | Status: DISCONTINUED | OUTPATIENT
Start: 2017-11-01 | End: 2017-11-02

## 2017-11-01 RX ORDER — LEVOTHYROXINE SODIUM 125 MCG
150 TABLET ORAL DAILY
Qty: 0 | Refills: 0 | Status: DISCONTINUED | OUTPATIENT
Start: 2017-11-01 | End: 2017-11-02

## 2017-11-01 RX ORDER — HEPARIN SODIUM 5000 [USP'U]/ML
7500 INJECTION INTRAVENOUS; SUBCUTANEOUS ONCE
Qty: 0 | Refills: 0 | Status: COMPLETED | OUTPATIENT
Start: 2017-11-01 | End: 2017-11-01

## 2017-11-01 RX ORDER — HYOSCYAMINE SULFATE 0.13 MG
0.12 TABLET ORAL EVERY 6 HOURS
Qty: 0 | Refills: 0 | Status: DISCONTINUED | OUTPATIENT
Start: 2017-11-01 | End: 2017-11-02

## 2017-11-01 RX ORDER — HYDRALAZINE HCL 50 MG
10 TABLET ORAL EVERY 6 HOURS
Qty: 0 | Refills: 0 | Status: DISCONTINUED | OUTPATIENT
Start: 2017-11-01 | End: 2017-11-02

## 2017-11-01 RX ORDER — FAMOTIDINE 10 MG/ML
20 INJECTION INTRAVENOUS ONCE
Qty: 0 | Refills: 0 | Status: COMPLETED | OUTPATIENT
Start: 2017-11-01 | End: 2017-11-01

## 2017-11-01 RX ORDER — INSULIN LISPRO 100/ML
VIAL (ML) SUBCUTANEOUS
Qty: 0 | Refills: 0 | Status: DISCONTINUED | OUTPATIENT
Start: 2017-11-01 | End: 2017-11-02

## 2017-11-01 RX ORDER — PANTOPRAZOLE SODIUM 20 MG/1
40 TABLET, DELAYED RELEASE ORAL DAILY
Qty: 0 | Refills: 0 | Status: DISCONTINUED | OUTPATIENT
Start: 2017-11-01 | End: 2017-11-02

## 2017-11-01 RX ORDER — POTASSIUM CHLORIDE 20 MEQ
10 PACKET (EA) ORAL
Qty: 0 | Refills: 0 | Status: DISCONTINUED | OUTPATIENT
Start: 2017-11-01 | End: 2017-11-02

## 2017-11-01 RX ORDER — LIDOCAINE HCL 20 MG/ML
0.2 VIAL (ML) INJECTION ONCE
Qty: 0 | Refills: 0 | Status: DISCONTINUED | OUTPATIENT
Start: 2017-11-01 | End: 2017-11-01

## 2017-11-01 RX ORDER — ACETAMINOPHEN 500 MG
1000 TABLET ORAL ONCE
Qty: 0 | Refills: 0 | Status: COMPLETED | OUTPATIENT
Start: 2017-11-01 | End: 2017-11-01

## 2017-11-01 RX ORDER — KETOROLAC TROMETHAMINE 30 MG/ML
30 SYRINGE (ML) INJECTION EVERY 6 HOURS
Qty: 0 | Refills: 0 | Status: DISCONTINUED | OUTPATIENT
Start: 2017-11-01 | End: 2017-11-02

## 2017-11-01 RX ORDER — HYDROMORPHONE HYDROCHLORIDE 2 MG/ML
0.25 INJECTION INTRAMUSCULAR; INTRAVENOUS; SUBCUTANEOUS
Qty: 0 | Refills: 0 | Status: DISCONTINUED | OUTPATIENT
Start: 2017-11-01 | End: 2017-11-02

## 2017-11-01 RX ADMIN — Medication 50 MILLIGRAM(S): at 13:12

## 2017-11-01 RX ADMIN — HYDROMORPHONE HYDROCHLORIDE 0.25 MILLIGRAM(S): 2 INJECTION INTRAMUSCULAR; INTRAVENOUS; SUBCUTANEOUS at 20:02

## 2017-11-01 RX ADMIN — HYDROMORPHONE HYDROCHLORIDE 0.25 MILLIGRAM(S): 2 INJECTION INTRAMUSCULAR; INTRAVENOUS; SUBCUTANEOUS at 15:15

## 2017-11-01 RX ADMIN — SODIUM CHLORIDE 150 MILLILITER(S): 9 INJECTION, SOLUTION INTRAVENOUS at 17:42

## 2017-11-01 RX ADMIN — SODIUM CHLORIDE 3 MILLILITER(S): 9 INJECTION INTRAMUSCULAR; INTRAVENOUS; SUBCUTANEOUS at 10:59

## 2017-11-01 RX ADMIN — ONDANSETRON 4 MILLIGRAM(S): 8 TABLET, FILM COATED ORAL at 22:40

## 2017-11-01 RX ADMIN — Medication 200 MILLIGRAM(S): at 21:30

## 2017-11-01 RX ADMIN — Medication 0.12 MILLIGRAM(S): at 23:43

## 2017-11-01 RX ADMIN — SODIUM CHLORIDE 250 MILLILITER(S): 9 INJECTION, SOLUTION INTRAVENOUS at 19:17

## 2017-11-01 RX ADMIN — Medication 0.12 MILLIGRAM(S): at 18:22

## 2017-11-01 RX ADMIN — HYDROMORPHONE HYDROCHLORIDE 0.25 MILLIGRAM(S): 2 INJECTION INTRAMUSCULAR; INTRAVENOUS; SUBCUTANEOUS at 15:47

## 2017-11-01 RX ADMIN — Medication 400 MILLIGRAM(S): at 21:13

## 2017-11-01 RX ADMIN — PANTOPRAZOLE SODIUM 40 MILLIGRAM(S): 20 TABLET, DELAYED RELEASE ORAL at 16:55

## 2017-11-01 RX ADMIN — Medication 200 MILLIGRAM(S): at 21:55

## 2017-11-01 RX ADMIN — ONDANSETRON 8 MILLIGRAM(S): 8 TABLET, FILM COATED ORAL at 16:55

## 2017-11-01 RX ADMIN — HYDROMORPHONE HYDROCHLORIDE 0.25 MILLIGRAM(S): 2 INJECTION INTRAMUSCULAR; INTRAVENOUS; SUBCUTANEOUS at 19:51

## 2017-11-01 RX ADMIN — FAMOTIDINE 20 MILLIGRAM(S): 10 INJECTION INTRAVENOUS at 16:53

## 2017-11-01 RX ADMIN — Medication 1000 MILLIGRAM(S): at 21:30

## 2017-11-01 RX ADMIN — HEPARIN SODIUM 7500 UNIT(S): 5000 INJECTION INTRAVENOUS; SUBCUTANEOUS at 10:58

## 2017-11-01 RX ADMIN — HEPARIN SODIUM 7500 UNIT(S): 5000 INJECTION INTRAVENOUS; SUBCUTANEOUS at 18:23

## 2017-11-01 NOTE — BRIEF OPERATIVE NOTE - PROCEDURE
<<-----Click on this checkbox to enter Procedure Gastrectomy, sleeve, laparoscopic  11/01/2017    Active  MIKEY

## 2017-11-01 NOTE — BRIEF OPERATIVE NOTE - OPERATION/FINDINGS
Sleeve gastrectomy performed over 36-Fr suction-calibration tube.  No leak on saline test.  Staple line reinforced with Evicel and interrupted omentopexy.

## 2017-11-02 ENCOUNTER — TRANSCRIPTION ENCOUNTER (OUTPATIENT)
Age: 47
End: 2017-11-02

## 2017-11-02 LAB
ANION GAP SERPL CALC-SCNC: 12 MMOL/L — SIGNIFICANT CHANGE UP (ref 5–17)
BASOPHILS # BLD AUTO: 0 K/UL — SIGNIFICANT CHANGE UP (ref 0–0.2)
BASOPHILS NFR BLD AUTO: 0.4 % — SIGNIFICANT CHANGE UP (ref 0–2)
BUN SERPL-MCNC: 5 MG/DL — LOW (ref 7–23)
CALCIUM SERPL-MCNC: 8.1 MG/DL — LOW (ref 8.4–10.5)
CHLORIDE SERPL-SCNC: 101 MMOL/L — SIGNIFICANT CHANGE UP (ref 96–108)
CO2 SERPL-SCNC: 25 MMOL/L — SIGNIFICANT CHANGE UP (ref 22–31)
CREAT SERPL-MCNC: 0.55 MG/DL — SIGNIFICANT CHANGE UP (ref 0.5–1.3)
EOSINOPHIL # BLD AUTO: 0 K/UL — SIGNIFICANT CHANGE UP (ref 0–0.5)
EOSINOPHIL NFR BLD AUTO: 0 % — SIGNIFICANT CHANGE UP (ref 0–6)
GLUCOSE BLDC GLUCOMTR-MCNC: 116 MG/DL — HIGH (ref 70–99)
GLUCOSE BLDC GLUCOMTR-MCNC: 151 MG/DL — HIGH (ref 70–99)
GLUCOSE SERPL-MCNC: 128 MG/DL — HIGH (ref 70–99)
HCT VFR BLD CALC: 38 % — SIGNIFICANT CHANGE UP (ref 34.5–45)
HGB BLD-MCNC: 11.9 G/DL — SIGNIFICANT CHANGE UP (ref 11.5–15.5)
LYMPHOCYTES # BLD AUTO: 0.9 K/UL — LOW (ref 1–3.3)
LYMPHOCYTES # BLD AUTO: 10.3 % — LOW (ref 13–44)
MCHC RBC-ENTMCNC: 25.8 PG — LOW (ref 27–34)
MCHC RBC-ENTMCNC: 31.4 GM/DL — LOW (ref 32–36)
MCV RBC AUTO: 82.3 FL — SIGNIFICANT CHANGE UP (ref 80–100)
MONOCYTES # BLD AUTO: 0.5 K/UL — SIGNIFICANT CHANGE UP (ref 0–0.9)
MONOCYTES NFR BLD AUTO: 6.2 % — SIGNIFICANT CHANGE UP (ref 2–14)
NEUTROPHILS # BLD AUTO: 7.3 K/UL — SIGNIFICANT CHANGE UP (ref 1.8–7.4)
NEUTROPHILS NFR BLD AUTO: 83.1 % — HIGH (ref 43–77)
PLATELET # BLD AUTO: 239 K/UL — SIGNIFICANT CHANGE UP (ref 150–400)
POTASSIUM SERPL-MCNC: 4.1 MMOL/L — SIGNIFICANT CHANGE UP (ref 3.5–5.3)
POTASSIUM SERPL-SCNC: 4.1 MMOL/L — SIGNIFICANT CHANGE UP (ref 3.5–5.3)
RBC # BLD: 4.62 M/UL — SIGNIFICANT CHANGE UP (ref 3.8–5.2)
RBC # FLD: 15.4 % — HIGH (ref 10.3–14.5)
SODIUM SERPL-SCNC: 138 MMOL/L — SIGNIFICANT CHANGE UP (ref 135–145)
WBC # BLD: 8.8 K/UL — SIGNIFICANT CHANGE UP (ref 3.8–10.5)
WBC # FLD AUTO: 8.8 K/UL — SIGNIFICANT CHANGE UP (ref 3.8–10.5)

## 2017-11-02 RX ORDER — IBUPROFEN 200 MG
1 TABLET ORAL
Qty: 0 | Refills: 0 | COMMUNITY

## 2017-11-02 RX ORDER — WARFARIN SODIUM 2.5 MG/1
1 TABLET ORAL
Qty: 0 | Refills: 0 | COMMUNITY

## 2017-11-02 RX ORDER — BUPROPION HYDROCHLORIDE 150 MG/1
300 TABLET, EXTENDED RELEASE ORAL DAILY
Qty: 0 | Refills: 0 | Status: DISCONTINUED | OUTPATIENT
Start: 2017-11-02 | End: 2017-11-02

## 2017-11-02 RX ORDER — ATORVASTATIN CALCIUM 80 MG/1
1 TABLET, FILM COATED ORAL
Qty: 0 | Refills: 0 | COMMUNITY

## 2017-11-02 RX ORDER — HYOSCYAMINE SULFATE 0.13 MG
1 TABLET ORAL
Qty: 60 | Refills: 0 | OUTPATIENT
Start: 2017-11-02 | End: 2017-11-12

## 2017-11-02 RX ORDER — OXYCODONE HYDROCHLORIDE 5 MG/1
5 TABLET ORAL
Qty: 80 | Refills: 0 | OUTPATIENT
Start: 2017-11-02 | End: 2017-11-06

## 2017-11-02 RX ORDER — PREGABALIN 225 MG/1
1 CAPSULE ORAL
Qty: 0 | Refills: 0 | COMMUNITY

## 2017-11-02 RX ORDER — OMEPRAZOLE 10 MG/1
1 CAPSULE, DELAYED RELEASE ORAL
Qty: 14 | Refills: 0
Start: 2017-11-02 | End: 2017-11-16

## 2017-11-02 RX ORDER — PYRIDOXINE HCL (VITAMIN B6) 100 MG
1 TABLET ORAL
Qty: 0 | Refills: 0 | COMMUNITY

## 2017-11-02 RX ORDER — LEVOTHYROXINE SODIUM 125 MCG
137 TABLET ORAL DAILY
Qty: 0 | Refills: 0 | Status: DISCONTINUED | OUTPATIENT
Start: 2017-11-02 | End: 2017-11-02

## 2017-11-02 RX ORDER — PANTOPRAZOLE SODIUM 20 MG/1
1 TABLET, DELAYED RELEASE ORAL
Qty: 14 | Refills: 0 | OUTPATIENT
Start: 2017-11-02 | End: 2017-11-16

## 2017-11-02 RX ORDER — FERROUS SULFATE 325(65) MG
1 TABLET ORAL
Qty: 0 | Refills: 0 | COMMUNITY

## 2017-11-02 RX ORDER — HYDROMORPHONE HYDROCHLORIDE 2 MG/ML
0.5 INJECTION INTRAMUSCULAR; INTRAVENOUS; SUBCUTANEOUS ONCE
Qty: 0 | Refills: 0 | Status: DISCONTINUED | OUTPATIENT
Start: 2017-11-02 | End: 2017-11-02

## 2017-11-02 RX ORDER — FAMOTIDINE 10 MG/ML
1 INJECTION INTRAVENOUS
Qty: 0 | Refills: 0 | COMMUNITY

## 2017-11-02 RX ORDER — BUPROPION HYDROCHLORIDE 150 MG/1
2 TABLET, EXTENDED RELEASE ORAL
Qty: 0 | Refills: 0 | COMMUNITY

## 2017-11-02 RX ORDER — DULOXETINE HYDROCHLORIDE 30 MG/1
1 CAPSULE, DELAYED RELEASE ORAL
Qty: 0 | Refills: 0 | COMMUNITY

## 2017-11-02 RX ORDER — MECLIZINE HCL 12.5 MG
1 TABLET ORAL
Qty: 0 | Refills: 0 | COMMUNITY

## 2017-11-02 RX ORDER — FUROSEMIDE 40 MG
40 TABLET ORAL DAILY
Qty: 0 | Refills: 0 | Status: DISCONTINUED | OUTPATIENT
Start: 2017-11-02 | End: 2017-11-02

## 2017-11-02 RX ORDER — FAMOTIDINE 10 MG/ML
20 INJECTION INTRAVENOUS
Qty: 0 | Refills: 0 | Status: DISCONTINUED | OUTPATIENT
Start: 2017-11-02 | End: 2017-11-02

## 2017-11-02 RX ORDER — HYOSCYAMINE SULFATE 0.13 MG
1 TABLET ORAL
Qty: 40 | Refills: 0 | OUTPATIENT
Start: 2017-11-02 | End: 2017-11-12

## 2017-11-02 RX ORDER — ONDANSETRON 8 MG/1
1 TABLET, FILM COATED ORAL
Qty: 30 | Refills: 0 | OUTPATIENT
Start: 2017-11-02 | End: 2017-11-12

## 2017-11-02 RX ORDER — FOLIC ACID 0.8 MG
1 TABLET ORAL
Qty: 0 | Refills: 0 | COMMUNITY

## 2017-11-02 RX ORDER — ACETAMINOPHEN 500 MG
10 TABLET ORAL
Qty: 200 | Refills: 0
Start: 2017-11-02 | End: 2017-11-07

## 2017-11-02 RX ORDER — ENOXAPARIN SODIUM 100 MG/ML
40 INJECTION SUBCUTANEOUS
Qty: 14 | Refills: 0 | OUTPATIENT
Start: 2017-11-02 | End: 2017-11-16

## 2017-11-02 RX ORDER — LEVOTHYROXINE SODIUM 125 MCG
1 TABLET ORAL
Qty: 0 | Refills: 0 | COMMUNITY

## 2017-11-02 RX ADMIN — HYDROMORPHONE HYDROCHLORIDE 0.5 MILLIGRAM(S): 2 INJECTION INTRAMUSCULAR; INTRAVENOUS; SUBCUTANEOUS at 02:25

## 2017-11-02 RX ADMIN — HYDROMORPHONE HYDROCHLORIDE 0.5 MILLIGRAM(S): 2 INJECTION INTRAMUSCULAR; INTRAVENOUS; SUBCUTANEOUS at 02:40

## 2017-11-02 RX ADMIN — PANTOPRAZOLE SODIUM 40 MILLIGRAM(S): 20 TABLET, DELAYED RELEASE ORAL at 12:14

## 2017-11-02 RX ADMIN — Medication 0.12 MILLIGRAM(S): at 06:21

## 2017-11-02 RX ADMIN — Medication 30 MILLIGRAM(S): at 00:25

## 2017-11-02 RX ADMIN — Medication 400 MILLIGRAM(S): at 13:44

## 2017-11-02 RX ADMIN — Medication 0.12 MILLIGRAM(S): at 12:05

## 2017-11-02 RX ADMIN — Medication 400 MILLIGRAM(S): at 08:28

## 2017-11-02 RX ADMIN — Medication 137 MICROGRAM(S): at 07:31

## 2017-11-02 RX ADMIN — ONDANSETRON 4 MILLIGRAM(S): 8 TABLET, FILM COATED ORAL at 06:21

## 2017-11-02 RX ADMIN — Medication 30 MILLIGRAM(S): at 09:53

## 2017-11-02 RX ADMIN — Medication 1: at 12:14

## 2017-11-02 RX ADMIN — SODIUM CHLORIDE 150 MILLILITER(S): 9 INJECTION, SOLUTION INTRAVENOUS at 00:25

## 2017-11-02 RX ADMIN — HEPARIN SODIUM 7500 UNIT(S): 5000 INJECTION INTRAVENOUS; SUBCUTANEOUS at 06:39

## 2017-11-02 RX ADMIN — Medication 400 MILLIGRAM(S): at 02:21

## 2017-11-02 RX ADMIN — ONDANSETRON 4 MILLIGRAM(S): 8 TABLET, FILM COATED ORAL at 12:05

## 2017-11-02 RX ADMIN — Medication 1000 MILLIGRAM(S): at 02:50

## 2017-11-02 NOTE — DISCHARGE NOTE ADULT - MEDICATION SUMMARY - MEDICATIONS TO STOP TAKING
I will STOP taking the medications listed below when I get home from the hospital:  None I will STOP taking the medications listed below when I get home from the hospital:    famotidine 20 mg oral tablet  -- 1 tab(s) by mouth 2 times a day    oxyCODONE-acetaminophen 5 mg-325 mg oral tablet  -- 1 tab(s) by mouth every 6 hours, As Needed - for moderate pain    buPROPion 150 mg/24 hours (XL) oral tablet, extended release  -- 2 tab(s) by mouth every 24 hours, for weight control - hold after surgery

## 2017-11-02 NOTE — DISCHARGE NOTE ADULT - PATIENT PORTAL LINK FT
“You can access the [employer] Patient Portal, offered by [facility], by registering with the following website: [email or web address]”

## 2017-11-02 NOTE — DISCHARGE NOTE ADULT - INSTRUCTIONS
Please resume your bariatric diet set by your surgeon. If you have any questions, please call the office.

## 2017-11-02 NOTE — PHARMACY COMMUNICATION NOTE - COMMENTS
Patient medication reconciliation done. Patient currently taking:     Duloxetine DR 60mg cap by mouth daily  Enalapril 2.5mg tab by mouth daily  Famotidine 20mg tab by mouth twice daily  Ferrous sulfate 325mg tab by mouth three times daily   Furosemide 40mg tab by mouth daily  Levothyroxine 137mcg tab by mouth daily  Meclizine 25mg tab by mouth three times daily  Warfarin 7.5mg tab by mouth daily - held since 10/17/17  Atorvastatin 40mg tab by mouth daily  Bupropion XL 150mg tab - 2 tabs by mouth daily  Pregabalin 75mg cap by mouth three times daily  Victoza 18mg under the skin daily  Vitamin B6 100mg tab by mouth daily  Folic acid 1mg tab by mouth daily   Potassium chloride 10mEq cap by mouth daily  Vitamin B12 1000mcg tab by mouth daily    Patient was informed to take daily multivitamins post surgically. Patient reeducated on NSAID avoidance (ibuprofen, ASA, naproxen, aleve) as they increased risk of GI bleeding. Patient informed to use APAP for mild pain otherwise contact prescriber for consult. Patient was informed on indications and directions for administration for hyoscyamine SL, oxycodone liquid, ondansetron ODT, and omeprazole DR. Patient was instructed to take the medications as follows:    Continue victoza as directed  Open duloxetine, potassium, and pregabalin capsules  Crush enalapril, famotidine, ferrous sulfate, levothyroxine, meclizine, atorvastatin, vitamin B6, vitamin B12, folic acid  Switch bupropion XL to bupropion IR 75mg tab - crush 2 tabs by mouth twice daily (Dr. Miller)  Hold furosemide after surgery  Crush warfarin and continue if indicated by physician

## 2017-11-02 NOTE — PROGRESS NOTE ADULT - SUBJECTIVE AND OBJECTIVE BOX
POST-OPERATIVE NOTE    Patient is a 47y old  Female who is 4 hours status post laparoscopic sleeve gastrectomy. Patient seen and evaluated in the PACU. She reports significant nausea, without vomiting, despite treatment with 8mg zofran 5 hours ago and promethazine minutes ago. She reports her pain is well-controlled. She has not gotten out of bed yet. Her oxygen saturation is 94-95% on 2L NC, and drops to 90% on room air.                                    Vital Signs Last 24 Hrs  T(C): 36.6 (01 Nov 2017 20:00), Max: 36.8 (01 Nov 2017 11:00)  T(F): 97.9 (01 Nov 2017 20:00), Max: 98.2 (01 Nov 2017 11:00)  HR: 86 (01 Nov 2017 21:00) (80 - 96)  BP: 154/68 (01 Nov 2017 21:00) (128/65 - 158/70)  BP(mean): 98 (01 Nov 2017 21:00) (96 - 98)  RR: 17 (01 Nov 2017 20:00) (14 - 18)  SpO2: 97% (01 Nov 2017 21:00) (96% - 98%)  I&O's Detail    01 Nov 2017 07:01  -  01 Nov 2017 21:58  --------------------------------------------------------  IN:    lactated ringers.: 300 mL    multivitamin/thiamine/folic acid in sodium chloride 0.9%: 500 mL  Total IN: 800 mL    OUT:    Indwelling Catheter - Urethral: 225 mL  Total OUT: 225 mL    Total NET: 575 mL        ceFAZolin   IVPB 3000  ceFAZolin   IVPB 3000  heparin  Injectable 7500  hydrALAZINE Injectable 10 PRN    PAST MEDICAL & SURGICAL HISTORY:  History of DVT (deep vein thrombosis): 2014  History of pulmonary embolism: 2014 x 2 -1 in march, 1 in July - on coumadin  Diabetic peripheral neuropathy: severe  Iron deficiency anemia  UTI (urinary tract infection): 9/1/17 positive urine cx, was treatead with  pencillin from 9/8/17-9/15/17  Morbid obesity with BMI of 60.0-69.9, adult: BMI 62  Menorrhagia: improved  Osteoarthritis  Hyperlipidemia  YVONNE on CPAP: severe, not using CPAP  Type 2 diabetes mellitus: Hg A1C 4.9 %  ( 9/12/17), diagnosed in 2011  Hypertension  GERD (gastroesophageal reflux disease)  Vertigo: chronic, without changes. Patient was evaluated in the past, etiology unknown.  Hypothyroidism  History of oophorectomy, unilateral: right 1999  S/P D&C (status post dilation and curettage)    Physical Exam:  General: NAD, resting comfortably in bed  Pulmonary: Nonlabored breathing, no respiratory distress  Cardiovascular: NSR  Abdominal: soft, NT/ND  Extremities: WWP      LABS:                        11.5   9.2   )-----------( 193      ( 01 Nov 2017 17:50 )             37.3     11-01    136  |  99  |  8   ----------------------------<  150<H>  3.7   |  25  |  0.58    Ca    8.5      01 Nov 2017 17:50  Phos  2.4     11-01  Mg     2.2     11-01      PT/INR - ( 01 Nov 2017 11:02 )   PT: 12.7 sec;   INR: 1.16 ratio           CAPILLARY BLOOD GLUCOSE  150 (01 Nov 2017 17:00)      POCT Blood Glucose.: 95 mg/dL (01 Nov 2017 09:57)      Radiology and Additional Studies:    Assessment:  47F hx morbid obesity who is 4 hours s/p lap sleeve gastrectomy.    Plan:  - Continue current pain control regimen  - Monitor nausea, ok to give additional zofran early  - Wean off nasal cannula as tolerated, maintaining O2 saturation > 93%  - NPO  - LR @ 150 cc/hr  - F/u AM labs  - DVT ppx with SCDs and SQH  - Dispo: patient has not yet met the 8-point criteria for discharge from the PACU. She will remain in PACU for now, and I will re-evaluate her at 1 and 2 hours from now for transfer to the floor.
Bariatric Surgery Progress Note    Post Op Day#: 1    24 hr events/Subjective: No acute issues overnight, Pain controlled with medications, Nausea controlled with anti-emetics, Voiding      Procedure:  Gastrectomy, sleeve, laparoscopic    Vital Signs Last 24 Hrs  T(C): 36.7 (02 Nov 2017 04:08), Max: 36.8 (01 Nov 2017 11:00)  T(F): 98 (02 Nov 2017 04:08), Max: 98.2 (01 Nov 2017 11:00)  HR: 88 (02 Nov 2017 04:08) (80 - 98)  BP: 138/78 (02 Nov 2017 04:08) (128/65 - 158/76)  BP(mean): 90 (02 Nov 2017 00:30) (90 - 109)  RR: 18 (02 Nov 2017 04:08) (14 - 18)  SpO2: 97% (02 Nov 2017 04:08) (95% - 98%)  Height (cm): 162.56 (11-01 @ 11:00)  Weight (kg): 162.5 (11-01 @ 11:00)  BMI (kg/m2): 61.5 (11-01 @ 11:00)  BSA (m2): 2.51 (11-01 @ 11:00)  I&O's Summary    01 Nov 2017 07:01  -  02 Nov 2017 05:28  --------------------------------------------------------  IN: 1550 mL / OUT: 2010 mL / NET: -460 mL      I&O's Detail    01 Nov 2017 07:01  -  02 Nov 2017 05:28  --------------------------------------------------------  IN:    lactated ringers.: 300 mL    multivitamin/thiamine/folic acid in sodium chloride 0.9%: 1250 mL  Total IN: 1550 mL    OUT:    Indwelling Catheter - Urethral: 1610 mL    Voided: 400 mL  Total OUT: 2010 mL    Total NET: -460 mL    Physical Exam:    General:  Appears stated age, well-groomed, well-nourished, no distress  Chest:  clear breath sounds  Cardiovascular:  Regular rate & rhythm  Abdomen:  Soft, incisions clean, dry intact, tenderness around incisions, no rebound or guarding  Skin:  No rash  Neuro/Psych:  Alert, oriented to time, place and person                           11.5   9.2   )-----------( 193      ( 01 Nov 2017 17:50 )             37.3       11-01    136  |  99  |  8   ----------------------------<  150<H>  3.7   |  25  |  0.58    Ca    8.5      01 Nov 2017 17:50  Phos  2.4     11-01  Mg     2.2     11-01        aluminum hydroxide/magnesium hydroxide/simethicone Suspension milliLiter(s) Oral every 4 hours PRN  ceFAZolin   IVPB milliGRAM(s) IV Intermittent once  dextrose 5%. milliLiter(s) IV Continuous <Continuous>  dextrose 50% Injectable Gram(s) IV Push once  dextrose Gel Dose(s) Oral once PRN  glucagon  Injectable milliGRAM(s) IntraMuscular once PRN  heparin  Injectable Unit(s) SubCutaneous every 12 hours  hydrALAZINE Injectable milliGRAM(s) IV Push every 6 hours PRN  hyoscyamine SL milliGRAM(s) SubLingual every 6 hours  insulin lispro (HumaLOG) corrective regimen sliding scale  SubCutaneous three times a day before meals  ketorolac   Injectable milliGRAM(s) IV Push every 6 hours PRN  lactated ringers. milliLiter(s) IV Continuous <Continuous>  multivitamin/thiamine/folic acid in sodium chloride 0.9% milliLiter(s) IV Continuous <Continuous>  ondansetron Injectable milliGRAM(s) IV Push every 6 hours  pantoprazole  Injectable milliGRAM(s) IV Push daily  potassium chloride  10 mEq/100 mL IVPB milliEquivalent(s) IV Intermittent every 1 hour PRN

## 2017-11-02 NOTE — DIETITIAN INITIAL EVALUATION ADULT. - PERTINENT MEDS FT
lactated ringers, MVI/Thiamine/folic acid in NaCl 0.9%, Pepcid, Lasix, aluminum hydroxide/magnesium hydroxide/simethicone, Levsin, Humalog corrective sliding scale, Zofran, Protonix, KCl

## 2017-11-02 NOTE — PROGRESS NOTE ADULT - ATTENDING COMMENTS
I saw and examined the patient, and reviewed  the history and data with the patient and staff  Agree with note which was also reviewed and edited where appropriate.  D/W patient, RN, residents and Fellow

## 2017-11-02 NOTE — DISCHARGE NOTE ADULT - HOSPITAL COURSE
47 yr old morbidly obese female, BMI 62,  with  HTN , HLD, controlled DM2, Diabetic Peripheral Neuropathy, h/o PE/DVT ( 2014)  On coumadin, YVONNE on CPAP, Anemia, hypothyroidism, GERD,   presents to [facility] for scheduled Laparoscopic vertical sleeve gastrectomy on 11/1/17.    The patient tolerated the procedure well. There were no complications. The patient was extubated in the OR and transferred to the PACU in stable condition and transferred to a surgical floor. The patient had daily wound care.  Once bowel function returned, diet was advanced as tolerated. The patient's pain was controlled by IV pain medications and then by PO pain medications. The patient was placed back on home medications. At the time of discharge, the patient was hemodynamically stable, was tolerating PO diet, voiding, ambulating, and was comfortable with adequate pain control. 47 yr old morbidly obese female, BMI 62,  with  HTN , HLD, controlled DM2, Diabetic Peripheral Neuropathy, h/o PE/DVT ( 2014)  On coumadin, YVONNE on CPAP, Anemia, hypothyroidism, GERD,   presents to [facility] for scheduled Laparoscopic vertical sleeve gastrectomy on 11/1/17.    The patient tolerated the procedure well. There were no complications. The patient was extubated in the OR and transferred to the PACU in stable condition and transferred to a surgical floor. The patient had daily wound care.  Once bowel function returned, diet was advanced as tolerated. The patient's pain was controlled by IV pain medications and then by PO pain medications. The patient was placed back on home medications. At the time of discharge, the patient was hemodynamically stable, was tolerating PO diet, voiding, ambulating, and was comfortable with adequate pain control.     Caprini score: 10  [place] score: 16

## 2017-11-02 NOTE — DISCHARGE NOTE ADULT - ADDITIONAL INSTRUCTIONS
Activity- No heavy lifting or straining over 15 lbs for the next two weeks;  Driving- Please do not drive until your pain is well controlled and you do not need to take pain medications.  You may shower-Do not submerge or scrub incision sites.  Please pat dry incisions/dressings.  Leave the white steri strips in place, they will fall off on their own in approximately 5-7 days. Activity- No heavy lifting or straining over 15 lbs for the next two weeks;  Driving- Please do not drive until your pain is well controlled and you do not need to take pain medications.  You may shower-Do not submerge or scrub incision sites.  Please pat dry incisions/dressings.  Leave the white steri strips in place, they will fall off on their own in approximately 5-7 days.    Pharmacy recommendations for medications:  Duloxetine DR 60mg cap by mouth daily; Enalapril 2.5mg tab by mouth daily; Famotidine 20mg tab by mouth twice daily; Ferrous sulfate 325mg tab by mouth three times daily ; Furosemide 40mg tab by mouth daily; Levothyroxine 137mcg tab by mouth daily; Meclizine 25mg tab by mouth three times daily; Warfarin 7.5mg tab by mouth daily - held since 10/17/17  Atorvastatin 40mg tab by mouth daily; Bupropion XL 150mg tab - 2 tabs by mouth daily; Pregabalin 75mg cap by mouth three times daily; Victoza 18mg under the skin daily; Vitamin B6 100mg tab by mouth daily; Folic acid 1mg tab by mouth daily ; Potassium chloride 10mEq cap by mouth daily; Vitamin B12 1000mcg tab by mouth daily  Patient was informed to take daily multivitamins post surgically. Patient reeducated on NSAID avoidance (ibuprofen, ASA, naproxen, aleve) as they increased risk of GI bleeding. Patient informed to use APAP for mild pain otherwise contact prescriber for consult. Patient was informed on indications and directions for administration for hyoscyamine SL, oxycodone liquid, ondansetron ODT, and omeprazole . Patient was instructed to take the medications as follows:  Continue victoza as directed  Open duloxetine, potassium, and pregabalin capsules; Crush enalapril, famotidine, ferrous sulfate, levothyroxine, meclizine, atorvastatin, vitamin B6, vitamin B12, folic acid  Switch bupropion XL to bupropion IR 75mg tab - crush 2 tabs by mouth twice daily (Dr. Miller)  Hold furosemide after surgery  Crush warfarin and continue if indicated by physician1

## 2017-11-02 NOTE — DISCHARGE NOTE ADULT - CARE PROVIDER_API CALL
Kain White), Surgery  [address] [facility]  [address] [place] [id number]  Phone: [phone]  Fax: [phone]

## 2017-11-02 NOTE — DIETITIAN INITIAL EVALUATION ADULT. - ADHERENCE
Pt originally scheduled for surgery 9/20. Pt reports following a full liquid diet for 2 weeks before that time as instructed by outpatient RD. Pt reports starting the full liquid diet again 2 weeks PTA. Pt reports having yogurt, soups, broth, sugar free jello and protein shakes.

## 2017-11-02 NOTE — PROGRESS NOTE ADULT - ASSESSMENT
Assessment and Plan:  47y y/o Female s/p Lap sleeve Gastrectomy, POD # 1    - Start PO liquid oxycodone PRN for pain  - Continue ambulation   - Encourage Incentive Spirometer use  - Bariatric clears   - Continue chemical and mechanical DVT prophylaxis  - Discharge home once tolerating adequate PO and 8 point discharge criteria met    Discuss with attending    Davian Valdez DDS, MD

## 2017-11-02 NOTE — DISCHARGE NOTE ADULT - MEDICATION SUMMARY - MEDICATIONS TO TAKE
I will START or STAY ON the medications listed below when I get home from the hospital:    oxyCODONE-acetaminophen 5 mg-325 mg oral tablet  -- 1 tab(s) by mouth every 6 hours, As Needed - for moderate pain  -- Indication: For Pain    ibuprofen 600 mg oral tablet  -- 1 tab(s) by mouth every 6 hours, As Needed  -- Indication: For Pain    enalapril 2.5 mg oral tablet  -- 1 tab(s) by mouth once a day  -- Indication: For Htn    warfarin 7.5 mg oral tablet  -- 1 tab(s) by mouth once a day, last dose 10/17/17.  -- Indication: For anticoagulation/DVT    Lyrica 75 mg oral capsule  -- 1 cap(s) by mouth 3 times a day - may open capsule  -- Indication: For neuropathy    DULoxetine 60 mg oral delayed release capsule  -- 1 cap(s) by mouth once a day, for leg pain - may open and mix with unsweetened apple sauce  -- Indication: For neuropathy    Victoza 18 mg/3 mL subcutaneous solution  -- 1 dose(s) subcutaneous once a day  -- Indication: For dm    meclizine 25 mg oral tablet  -- 1 tab(s) by mouth 3 times a day  -- Indication: For nausea    atorvastatin 40 mg oral tablet  -- 1 tab(s) by mouth once a day, noon  -- Indication: For hld    furosemide 40 mg oral tablet  -- 1 tab(s) by mouth once a day, LE edema  -- Indication: For htn    famotidine 20 mg oral tablet  -- 1 tab(s) by mouth 2 times a day  -- Indication: For GERD    ferrous sulfate 325 mg (65 mg elemental iron) oral tablet  -- 1 tab(s) by mouth 3 times a day  -- Indication: For anemia    potassium chloride 10 mEq oral capsule, extended release  -- 1 cap(s) by mouth once a day - may open capsule  -- Indication: For Electrolytes/vitamins    buPROPion 150 mg/24 hours (XL) oral tablet, extended release  -- 2 tab(s) by mouth every 24 hours, for weight control - hold after surgery  -- Indication: For depression/neuropathy    buPROPion 75 mg oral tablet  -- 2 tab(s) by mouth 2 times a day  -- Indication: For depression/neuropathy    levothyroxine 137 mcg (0.137 mg) oral tablet  -- 1 tab(s) by mouth once a day  -- Indication: For hypothyroidism    Vitamin B-12 1000 mcg oral tablet  -- 1 tab(s) by mouth once a day  -- Indication: For vitamin    folic acid 1 mg oral tablet  -- 1 tab(s) by mouth once a day  -- Indication: For vitamin    Vitamin B6 100 mg oral tablet  -- 1 tab(s) by mouth once a day  -- Indication: For vitamin I will START or STAY ON the medications listed below when I get home from the hospital:    oxyCODONE 5 mg/5 mL oral solution  -- 5 milliliter(s) by mouth every 6 hours, As Needed MDD:6   -- Caution federal law prohibits the transfer of this drug to any person other  than the person for whom it was prescribed.  May cause drowsiness.  Alcohol may intensify this effect.  Use care when operating dangerous machinery.  This prescription cannot be refilled.  Using more of this medication than prescribed may cause serious breathing problems.    -- Indication: For Pain    acetaminophen 160 mg/5 mL oral liquid  -- 10 milliliter(s) by mouth every 6 hours, As Needed   -- This product contains acetaminophen.  Do not use  with any other product containing acetaminophen to prevent possible liver damage.    -- Indication: For Pain    enalapril 2.5 mg oral tablet  -- 1 tab(s) by mouth once a day. Crush and mix in apple sauce  -- Indication: For htn    warfarin 7.5 mg oral tablet  -- 1 tab(s) by mouth once a day, CRUSH AND MIX IN APPLE SAUCE  -- Indication: For dvt ppx    enoxaparin 40 mg/0.4 mL injectable solution  -- 40 milligram(s) injectable once a day   -- It is very important that you take or use this exactly as directed.  Do not skip doses or discontinue unless directed by your doctor.    -- Indication: For dvt ppx    Lyrica 75 mg oral capsule  -- 1 cap(s) by mouth 3 times a day -  open capsule AND MIX IN APLPE SAUCE  -- Indication: For neuropathy    DULoxetine 60 mg oral delayed release capsule  -- 1 cap(s) by mouth once a day, for leg pain - may open and mix with unsweetened apple sauce  -- Indication: For neuropathy    Victoza 18 mg/3 mL subcutaneous solution  -- 1 dose(s) subcutaneous once a day  -- Indication: For dm    Zofran ODT 4 mg oral tablet, disintegrating  -- 1 tab(s) by mouth every 8 hours, As Needed nausea/vomiting  -- Indication: For nausea    meclizine 25 mg oral tablet  -- 1 tab(s) by mouth 3 times a day - CRUSH AND MIX IN APLPE SAUCE  -- Indication: For nausea    atorvastatin 40 mg oral tablet  -- 1 tab(s) by mouth once a day, noon - CRUSH AND MIX IN APLPE SAUCE  -- Indication: For hld    furosemide 40 mg oral tablet  -- 1 tab(s) by mouth once a day, LE edema  -- Indication: For htn    Levsin 0.125 mg oral tablet  -- 1 tab(s) by mouth every 6 hours, As Needed nausea, abdominal cramping  -- May cause drowsiness.  Alcohol may intensify this effect.  Use care when operating dangerous machinery.    -- Indication: For abdominal cramping, nasusea    ferrous sulfate 325 mg (65 mg elemental iron) oral tablet  -- 1 tab(s) by mouth 3 times a day - CRUSH AND MIX IN APLPE SAUCE  -- Indication: For anemia    potassium chloride 10 mEq oral capsule, extended release  -- 1 cap(s) by mouth once a day - may open capsule  -- Indication: For Electrolytes/vitamins    omeprazole 40 mg oral delayed release capsule  -- 1 cap(s) by mouth once a day. Open capsule and mix in apple sauce  -- It is very important that you take or use this exactly as directed.  Do not skip doses or discontinue unless directed by your doctor.  Obtain medical advice before taking any non-prescription drugs as some may affect the action of this medication.  Swallow whole.  Do not crush.    -- Indication: For GERD    buPROPion 75 mg oral tablet  -- 2 tab(s) by mouth 2 times a day - CRUSH AND MIX IN APLPE SAUCE  -- Indication: For depression/neuropathy    levothyroxine 137 mcg (0.137 mg) oral tablet  -- 1 tab(s) by mouth once a day - CRUSH AND MIX IN APLPE SAUCE  -- Indication: For hypothyroidism    folic acid 1 mg oral tablet  -- 1 tab(s) by mouth once a day - CRUSH AND MIX IN APLPE SAUCE  -- Indication: For vitamin    Vitamin B-12 1000 mcg oral tablet  -- 1 tab(s) by mouth once a day - CRUSH AND MIX IN APLPE SAUCE  -- Indication: For vitamin    Vitamin B6 100 mg oral tablet  -- 1 tab(s) by mouth once a day - CRUSH AND MIX IN APLPE SAUCE  -- Indication: For vitamin I will START or STAY ON the medications listed below when I get home from the hospital:    acetaminophen 160 mg/5 mL oral liquid  -- 10 milliliter(s) by mouth every 6 hours, As Needed   -- This product contains acetaminophen.  Do not use  with any other product containing acetaminophen to prevent possible liver damage.    -- Indication: For Pain    oxyCODONE 5 mg/5 mL oral solution  -- 5 milliliter(s) by mouth every 6 hours, As Needed MDD:20ml  -- Caution federal law prohibits the transfer of this drug to any person other  than the person for whom it was prescribed.  May cause drowsiness.  Alcohol may intensify this effect.  Use care when operating dangerous machinery.  This prescription cannot be refilled.  Using more of this medication than prescribed may cause serious breathing problems.    -- Indication: For Pain    enalapril 2.5 mg oral tablet  -- 1 tab(s) by mouth once a day. Crush and mix in apple sauce  -- Indication: For htn    warfarin 7.5 mg oral tablet  -- 1 tab(s) by mouth once a day, CRUSH AND MIX IN APPLE SAUCE  -- Indication: For dvt ppx    enoxaparin 40 mg/0.4 mL injectable solution  -- 40 milligram(s) injectable once a day   -- It is very important that you take or use this exactly as directed.  Do not skip doses or discontinue unless directed by your doctor.    -- Indication: For dvt ppx    Lyrica 75 mg oral capsule  -- 1 cap(s) by mouth 3 times a day -  open capsule AND MIX IN APLPE SAUCE  -- Indication: For neuropathy    DULoxetine 60 mg oral delayed release capsule  -- 1 cap(s) by mouth once a day, for leg pain - may open and mix with unsweetened apple sauce  -- Indication: For neuropathy    Victoza 18 mg/3 mL subcutaneous solution  -- 1 dose(s) subcutaneous once a day  -- Indication: For dm    Zofran ODT 4 mg oral tablet, disintegrating  -- 1 tab(s) by mouth every 8 hours, As Needed nausea/vomiting  -- Indication: For nausea    meclizine 25 mg oral tablet  -- 1 tab(s) by mouth 3 times a day - CRUSH AND MIX IN APLPE SAUCE  -- Indication: For nausea    atorvastatin 40 mg oral tablet  -- 1 tab(s) by mouth once a day, noon - CRUSH AND MIX IN APLPE SAUCE  -- Indication: For hld    furosemide 40 mg oral tablet  -- 1 tab(s) by mouth once a day, LE edema  -- Indication: For htn    Levsin 0.125 mg oral tablet  -- 1 tab(s) by mouth every 6 hours, As Needed nausea, abdominal cramping  -- May cause drowsiness.  Alcohol may intensify this effect.  Use care when operating dangerous machinery.    -- Indication: For abdominal cramping, nasusea    ferrous sulfate 325 mg (65 mg elemental iron) oral tablet  -- 1 tab(s) by mouth 3 times a day - CRUSH AND MIX IN APLPE SAUCE  -- Indication: For anemia    potassium chloride 10 mEq oral capsule, extended release  -- 1 cap(s) by mouth once a day - may open capsule  -- Indication: For Electrolytes/vitamins    omeprazole 40 mg oral delayed release capsule  -- 1 cap(s) by mouth once a day. Open capsule and mix in apple sauce  -- It is very important that you take or use this exactly as directed.  Do not skip doses or discontinue unless directed by your doctor.  Obtain medical advice before taking any non-prescription drugs as some may affect the action of this medication.  Swallow whole.  Do not crush.    -- Indication: For GERD    buPROPion 75 mg oral tablet  -- 2 tab(s) by mouth 2 times a day - CRUSH AND MIX IN APLPE SAUCE  -- Indication: For depression/neuropathy    levothyroxine 137 mcg (0.137 mg) oral tablet  -- 1 tab(s) by mouth once a day - CRUSH AND MIX IN APLPE SAUCE  -- Indication: For hypothyroidism    folic acid 1 mg oral tablet  -- 1 tab(s) by mouth once a day - CRUSH AND MIX IN APLPE SAUCE  -- Indication: For vitamin    Vitamin B-12 1000 mcg oral tablet  -- 1 tab(s) by mouth once a day - CRUSH AND MIX IN APLPE SAUCE  -- Indication: For vitamin    Vitamin B6 100 mg oral tablet  -- 1 tab(s) by mouth once a day - CRUSH AND MIX IN APLPE SAUCE  -- Indication: For vitamin

## 2017-11-02 NOTE — DIETITIAN INITIAL EVALUATION ADULT. - OTHER INFO
Consult received for bariatric surgery. Per chart pt has tried multiple wt loss attempts. Per outpatient RD note pt met with RD on 5/30/17 and reported reaching highest wt of 429 pounds within the last year and lost 40 pounds in recent months on a Weight Management program with diet pills. Pt was also able to lose 100 pounds in 2009 on the Atkins Diet (320 pounds to 220 pounds) but regained the wt. Pt reports weighing 395 pounds before starting initial 2 week full liquid diet on 9/6. Pt's presurgical wt was 358.3 pounds. Pt now S/P laparoscopic vertical sleeve gastrectomy. Pt reports nausea is controlled with medication, pt awaiting clear liquid bariatric diet at time of visit, RD explained components of diet. Pt with knowledge of bariatric full liquid diet however receptive to in depth review/reinforcement. Pt states planning to make protein shakes with protein powder and water and also consider a pre-made supplement and will make sure it meets recommended nutrient profile. Pt states she also purchased some of the necessary vitamins. Pt was advised to purchase chewable/liquid multivitamin with added elemental iron, chewable/liquid calcium citrate with vitamin D and sublingual B12. Pt was advised to take the chewable/liquid multivitamin with added elemental iron at least 2 hours apart from the chewable/liquid calcium citrate with vitamin D for optimal absorption. Pt states she plans to schedule a follow up appointment with outpatient RD. Pt able to teach back all points discussed during interview.

## 2017-11-02 NOTE — DISCHARGE NOTE ADULT - CARE PLAN
Principal Discharge DX:	Morbid obesity with BMI of 60.0-69.9, adult  Goal:	Ambulate, void, tolerate PO, control pain  Instructions for follow-up, activity and diet:	Please follow up with your primary care physician or doctor who is monitoring your anticoagulation (coumadin medication) and regarding your hospitalization. Please schedule an appointment with your primary care provider within 1-2 weeks after your discharge to review your hospital course and obtain blood work to make sure your anticoagulation is therapeutic. You will also be sent home with Lovenox for 2 weeks. Continue this medication until you are seen by your primary care physician. Please make them aware that you are taking this medication.    Please follow up with Dr. White within 1 week after discharge from the hospital. You may call the office to schedule an appointment.     - It is OK to resume your home medications.  - Upset stomach, Nausea and vomiting can be expected for the first couple weeks.

## 2017-11-02 NOTE — DIETITIAN INITIAL EVALUATION ADULT. - FACTORS AFF FOOD INTAKE
Initially procedure was scheduled for 9/20/17, it was postponed due to complex pre op evaluation. Pt now POD#1 laparoscopic vertical sleeve gastrectomy

## 2017-11-02 NOTE — DISCHARGE NOTE ADULT - PLAN OF CARE
Ambulate, void, tolerate PO, control pain Please follow up with your primary care physician or doctor who is monitoring your anticoagulation (coumadin medication) and regarding your hospitalization. Please schedule an appointment with your primary care provider within 1-2 weeks after your discharge to review your hospital course and obtain blood work to make sure your anticoagulation is therapeutic. You will also be sent home with Lovenox for 2 weeks. Continue this medication until you are seen by your primary care physician. Please make them aware that you are taking this medication.    Please follow up with Dr. White within 1 week after discharge from the hospital. You may call the office to schedule an appointment.     - It is OK to resume your home medications.  - Upset stomach, Nausea and vomiting can be expected for the first couple weeks.

## 2017-11-02 NOTE — DIETITIAN INITIAL EVALUATION ADULT. - ENERGY NEEDS
Ht: 64“, Wt: 358.3lbs- presurgical, BMI: 61.5kg/m2, IBW: 120 lbs (+/-10%)  no edema or pressure injuries

## 2017-11-03 VITALS
DIASTOLIC BLOOD PRESSURE: 64 MMHG | HEART RATE: 79 BPM | RESPIRATION RATE: 18 BRPM | TEMPERATURE: 98 F | OXYGEN SATURATION: 97 % | SYSTOLIC BLOOD PRESSURE: 106 MMHG

## 2017-11-03 PROCEDURE — 85027 COMPLETE CBC AUTOMATED: CPT

## 2017-11-03 PROCEDURE — 83735 ASSAY OF MAGNESIUM: CPT

## 2017-11-03 PROCEDURE — 84100 ASSAY OF PHOSPHORUS: CPT

## 2017-11-03 PROCEDURE — 81025 URINE PREGNANCY TEST: CPT

## 2017-11-03 PROCEDURE — 80048 BASIC METABOLIC PNL TOTAL CA: CPT

## 2017-11-03 PROCEDURE — 85610 PROTHROMBIN TIME: CPT

## 2017-11-03 PROCEDURE — 88305 TISSUE EXAM BY PATHOLOGIST: CPT

## 2017-11-03 PROCEDURE — C1889: CPT

## 2017-11-03 PROCEDURE — 82962 GLUCOSE BLOOD TEST: CPT

## 2017-11-07 ENCOUNTER — LABORATORY RESULT (OUTPATIENT)
Age: 47
End: 2017-11-07

## 2017-11-08 DIAGNOSIS — I26.99 OTHER PULMONARY EMBOLISM WITHOUT ACUTE COR PULMONALE: ICD-10-CM

## 2017-11-08 DIAGNOSIS — Z79.01 LONG TERM (CURRENT) USE OF ANTICOAGULANTS: ICD-10-CM

## 2017-11-08 LAB — SURGICAL PATHOLOGY STUDY: SIGNIFICANT CHANGE UP

## 2017-11-16 ENCOUNTER — APPOINTMENT (OUTPATIENT)
Dept: INTERNAL MEDICINE | Facility: CLINIC | Age: 47
End: 2017-11-16
Payer: MEDICARE

## 2017-11-16 VITALS
TEMPERATURE: 98 F | WEIGHT: 293 LBS | RESPIRATION RATE: 14 BRPM | DIASTOLIC BLOOD PRESSURE: 96 MMHG | HEART RATE: 120 BPM | SYSTOLIC BLOOD PRESSURE: 153 MMHG | OXYGEN SATURATION: 96 % | BODY MASS INDEX: 59.91 KG/M2

## 2017-11-16 DIAGNOSIS — E11.40 TYPE 2 DIABETES MELLITUS WITH DIABETIC NEUROPATHY, UNSPECIFIED: ICD-10-CM

## 2017-11-16 DIAGNOSIS — R79.1 ABNORMAL COAGULATION PROFILE: ICD-10-CM

## 2017-11-16 PROCEDURE — 99214 OFFICE O/P EST MOD 30 MIN: CPT | Mod: 25

## 2017-11-16 PROCEDURE — 36416 COLLJ CAPILLARY BLOOD SPEC: CPT

## 2017-11-17 ENCOUNTER — LABORATORY RESULT (OUTPATIENT)
Age: 47
End: 2017-11-17

## 2017-11-17 LAB
25(OH)D3 SERPL-MCNC: 23.6 NG/ML
ALBUMIN SERPL ELPH-MCNC: 3.9 G/DL
ALP BLD-CCNC: 102 U/L
ALT SERPL-CCNC: 16 U/L
ANION GAP SERPL CALC-SCNC: 19 MMOL/L
APTT BLD: 83.6 SEC
AST SERPL-CCNC: 27 U/L
BASOPHILS # BLD AUTO: 0.03 K/UL
BASOPHILS NFR BLD AUTO: 0.4 %
BILIRUB SERPL-MCNC: 0.3 MG/DL
BUN SERPL-MCNC: 9 MG/DL
CALCIUM SERPL-MCNC: 10 MG/DL
CHLORIDE SERPL-SCNC: 102 MMOL/L
CHOLEST SERPL-MCNC: 131 MG/DL
CHOLEST/HDLC SERPL: 3.7 RATIO
CO2 SERPL-SCNC: 23 MMOL/L
CREAT SERPL-MCNC: 0.7 MG/DL
EOSINOPHIL # BLD AUTO: 0.28 K/UL
EOSINOPHIL NFR BLD AUTO: 3.3 %
FOLATE SERPL-MCNC: >20 NG/ML
GLUCOSE SERPL-MCNC: 80 MG/DL
HBA1C MFR BLD HPLC: 5.1 %
HCT VFR BLD CALC: 41.9 %
HDLC SERPL-MCNC: 35 MG/DL
HGB BLD-MCNC: 12.4 G/DL
IMM GRANULOCYTES NFR BLD AUTO: 0.1 %
INR PPP: 6.96 RATIO
IRON SERPL-MCNC: 24 UG/DL
LDLC SERPL CALC-MCNC: 78 MG/DL
LYMPHOCYTES # BLD AUTO: 1.78 K/UL
LYMPHOCYTES NFR BLD AUTO: 20.8 %
MAN DIFF?: NORMAL
MCHC RBC-ENTMCNC: 24 PG
MCHC RBC-ENTMCNC: 29.6 GM/DL
MCV RBC AUTO: 81 FL
MONOCYTES # BLD AUTO: 0.93 K/UL
MONOCYTES NFR BLD AUTO: 10.9 %
NEUTROPHILS # BLD AUTO: 5.52 K/UL
NEUTROPHILS NFR BLD AUTO: 64.5 %
PLATELET # BLD AUTO: 335 K/UL
POTASSIUM SERPL-SCNC: 4.2 MMOL/L
PROT SERPL-MCNC: 8.9 G/DL
PT BLD: 81.8 SEC
RBC # BLD: 5.17 M/UL
RBC # FLD: 16 %
SODIUM SERPL-SCNC: 144 MMOL/L
TRIGL SERPL-MCNC: 89 MG/DL
TSH SERPL-ACNC: 0.28 UIU/ML
VIT B12 SERPL-MCNC: 828 PG/ML
WBC # FLD AUTO: 8.55 K/UL

## 2017-11-19 PROBLEM — E11.40 DIABETIC NEUROPATHY: Noted: 2017-03-23

## 2017-11-21 ENCOUNTER — LABORATORY RESULT (OUTPATIENT)
Age: 47
End: 2017-11-21

## 2017-11-22 ENCOUNTER — MEDICATION RENEWAL (OUTPATIENT)
Age: 47
End: 2017-11-22

## 2017-11-27 ENCOUNTER — APPOINTMENT (OUTPATIENT)
Dept: SURGERY | Facility: CLINIC | Age: 47
End: 2017-11-27
Payer: MEDICARE

## 2017-11-27 ENCOUNTER — LABORATORY RESULT (OUTPATIENT)
Age: 47
End: 2017-11-27

## 2017-11-27 VITALS
TEMPERATURE: 97.7 F | OXYGEN SATURATION: 96 % | DIASTOLIC BLOOD PRESSURE: 94 MMHG | RESPIRATION RATE: 16 BRPM | HEART RATE: 101 BPM | SYSTOLIC BLOOD PRESSURE: 160 MMHG

## 2017-11-27 PROCEDURE — 99024 POSTOP FOLLOW-UP VISIT: CPT

## 2017-11-27 RX ORDER — DICLOFENAC SODIUM 3 G/100G
3 GEL TOPICAL TWICE DAILY
Qty: 1 | Refills: 3 | Status: DISCONTINUED | COMMUNITY
Start: 2017-05-19 | End: 2017-11-27

## 2017-11-27 RX ORDER — PENICILLIN V POTASSIUM 500 MG/1
500 TABLET, FILM COATED ORAL 3 TIMES DAILY
Qty: 21 | Refills: 0 | Status: DISCONTINUED | COMMUNITY
Start: 2017-09-08 | End: 2017-11-27

## 2017-11-27 RX ORDER — CLINDAMYCIN HYDROCHLORIDE 300 MG/1
300 CAPSULE ORAL
Qty: 21 | Refills: 0 | Status: DISCONTINUED | COMMUNITY
Start: 2017-06-23 | End: 2017-11-27

## 2017-11-27 RX ORDER — PREGABALIN 75 MG/1
75 CAPSULE ORAL
Qty: 90 | Refills: 0 | Status: DISCONTINUED | COMMUNITY
Start: 2017-06-27 | End: 2017-11-27

## 2017-12-04 ENCOUNTER — APPOINTMENT (OUTPATIENT)
Dept: BARIATRICS/WEIGHT MGMT | Facility: CLINIC | Age: 47
End: 2017-12-04
Payer: MEDICARE

## 2017-12-04 VITALS
DIASTOLIC BLOOD PRESSURE: 64 MMHG | OXYGEN SATURATION: 98 % | BODY MASS INDEX: 50.02 KG/M2 | HEART RATE: 107 BPM | SYSTOLIC BLOOD PRESSURE: 104 MMHG | HEIGHT: 64 IN | WEIGHT: 293 LBS

## 2017-12-04 PROCEDURE — 99214 OFFICE O/P EST MOD 30 MIN: CPT

## 2017-12-07 ENCOUNTER — APPOINTMENT (OUTPATIENT)
Dept: INTERNAL MEDICINE | Facility: CLINIC | Age: 47
End: 2017-12-07
Payer: MEDICARE

## 2017-12-07 VITALS
SYSTOLIC BLOOD PRESSURE: 131 MMHG | TEMPERATURE: 98.6 F | HEART RATE: 115 BPM | DIASTOLIC BLOOD PRESSURE: 72 MMHG | HEIGHT: 64 IN | WEIGHT: 293 LBS | OXYGEN SATURATION: 93 % | RESPIRATION RATE: 12 BRPM | BODY MASS INDEX: 50.02 KG/M2

## 2017-12-07 PROCEDURE — 99214 OFFICE O/P EST MOD 30 MIN: CPT

## 2017-12-11 ENCOUNTER — RX RENEWAL (OUTPATIENT)
Age: 47
End: 2017-12-11

## 2017-12-27 ENCOUNTER — MEDICATION RENEWAL (OUTPATIENT)
Age: 47
End: 2017-12-27

## 2017-12-29 ENCOUNTER — LABORATORY RESULT (OUTPATIENT)
Age: 47
End: 2017-12-29

## 2018-01-05 ENCOUNTER — APPOINTMENT (OUTPATIENT)
Age: 48
End: 2018-01-05
Payer: MEDICARE

## 2018-01-05 ENCOUNTER — MEDICATION RENEWAL (OUTPATIENT)
Age: 48
End: 2018-01-05

## 2018-01-08 ENCOUNTER — LABORATORY RESULT (OUTPATIENT)
Age: 48
End: 2018-01-08

## 2018-01-16 ENCOUNTER — INPATIENT (INPATIENT)
Facility: HOSPITAL | Age: 48
LOS: 4 days | Discharge: HOME | DRG: 381 | End: 2018-01-21
Attending: SURGERY | Admitting: SURGERY
Payer: MEDICARE

## 2018-01-16 VITALS
SYSTOLIC BLOOD PRESSURE: 137 MMHG | WEIGHT: 293 LBS | TEMPERATURE: 98 F | HEART RATE: 100 BPM | OXYGEN SATURATION: 100 % | RESPIRATION RATE: 22 BRPM | DIASTOLIC BLOOD PRESSURE: 84 MMHG

## 2018-01-16 DIAGNOSIS — Z98.890 OTHER SPECIFIED POSTPROCEDURAL STATES: Chronic | ICD-10-CM

## 2018-01-16 DIAGNOSIS — R10.9 UNSPECIFIED ABDOMINAL PAIN: ICD-10-CM

## 2018-01-16 DIAGNOSIS — Z90.721 ACQUIRED ABSENCE OF OVARIES, UNILATERAL: Chronic | ICD-10-CM

## 2018-01-16 LAB
ALBUMIN SERPL ELPH-MCNC: 3.9 G/DL — SIGNIFICANT CHANGE UP (ref 3.3–5)
ALP SERPL-CCNC: 74 U/L — SIGNIFICANT CHANGE UP (ref 40–120)
ALT FLD-CCNC: 26 U/L RC — SIGNIFICANT CHANGE UP (ref 10–45)
ANION GAP SERPL CALC-SCNC: 15 MMOL/L — SIGNIFICANT CHANGE UP (ref 5–17)
APTT BLD: 34.4 SEC — SIGNIFICANT CHANGE UP (ref 27.5–37.4)
AST SERPL-CCNC: 27 U/L — SIGNIFICANT CHANGE UP (ref 10–40)
BASE EXCESS BLDV CALC-SCNC: 2.9 MMOL/L — HIGH (ref -2–2)
BASOPHILS # BLD AUTO: 0.1 K/UL — SIGNIFICANT CHANGE UP (ref 0–0.2)
BASOPHILS NFR BLD AUTO: 1 % — SIGNIFICANT CHANGE UP (ref 0–2)
BILIRUB SERPL-MCNC: 0.4 MG/DL — SIGNIFICANT CHANGE UP (ref 0.2–1.2)
BUN SERPL-MCNC: 8 MG/DL — SIGNIFICANT CHANGE UP (ref 7–23)
CA-I SERPL-SCNC: 1.13 MMOL/L — SIGNIFICANT CHANGE UP (ref 1.12–1.3)
CALCIUM SERPL-MCNC: 8.9 MG/DL — SIGNIFICANT CHANGE UP (ref 8.4–10.5)
CHLORIDE BLDV-SCNC: 104 MMOL/L — SIGNIFICANT CHANGE UP (ref 96–108)
CHLORIDE SERPL-SCNC: 100 MMOL/L — SIGNIFICANT CHANGE UP (ref 96–108)
CO2 BLDV-SCNC: 30 MMOL/L — SIGNIFICANT CHANGE UP (ref 22–30)
CO2 SERPL-SCNC: 26 MMOL/L — SIGNIFICANT CHANGE UP (ref 22–31)
CREAT SERPL-MCNC: 0.62 MG/DL — SIGNIFICANT CHANGE UP (ref 0.5–1.3)
EOSINOPHIL # BLD AUTO: 0.1 K/UL — SIGNIFICANT CHANGE UP (ref 0–0.5)
EOSINOPHIL NFR BLD AUTO: 0.9 % — SIGNIFICANT CHANGE UP (ref 0–6)
GAS PNL BLDV: 139 MMOL/L — SIGNIFICANT CHANGE UP (ref 136–145)
GAS PNL BLDV: SIGNIFICANT CHANGE UP
GLUCOSE BLDC GLUCOMTR-MCNC: 94 MG/DL — SIGNIFICANT CHANGE UP (ref 70–99)
GLUCOSE BLDV-MCNC: 83 MG/DL — SIGNIFICANT CHANGE UP (ref 70–99)
GLUCOSE SERPL-MCNC: 85 MG/DL — SIGNIFICANT CHANGE UP (ref 70–99)
HCO3 BLDV-SCNC: 28 MMOL/L — SIGNIFICANT CHANGE UP (ref 21–29)
HCT VFR BLD CALC: 27.9 % — LOW (ref 34.5–45)
HCT VFR BLDA CALC: 29 % — LOW (ref 39–50)
HGB BLD CALC-MCNC: 9.4 G/DL — LOW (ref 11.5–15.5)
HGB BLD-MCNC: 8.4 G/DL — LOW (ref 11.5–15.5)
INR BLD: 1.87 RATIO — HIGH (ref 0.88–1.16)
LACTATE BLDV-MCNC: 1.7 MMOL/L — SIGNIFICANT CHANGE UP (ref 0.7–2)
LYMPHOCYTES # BLD AUTO: 2.1 K/UL — SIGNIFICANT CHANGE UP (ref 1–3.3)
LYMPHOCYTES # BLD AUTO: 22.3 % — SIGNIFICANT CHANGE UP (ref 13–44)
MCHC RBC-ENTMCNC: 24.1 PG — LOW (ref 27–34)
MCHC RBC-ENTMCNC: 30.2 GM/DL — LOW (ref 32–36)
MCV RBC AUTO: 79.8 FL — LOW (ref 80–100)
MONOCYTES # BLD AUTO: 0.7 K/UL — SIGNIFICANT CHANGE UP (ref 0–0.9)
MONOCYTES NFR BLD AUTO: 7.9 % — SIGNIFICANT CHANGE UP (ref 2–14)
NEUTROPHILS # BLD AUTO: 6.4 K/UL — SIGNIFICANT CHANGE UP (ref 1.8–7.4)
NEUTROPHILS NFR BLD AUTO: 67.9 % — SIGNIFICANT CHANGE UP (ref 43–77)
PCO2 BLDV: 50 MMHG — SIGNIFICANT CHANGE UP (ref 35–50)
PH BLDV: 7.37 — SIGNIFICANT CHANGE UP (ref 7.35–7.45)
PLATELET # BLD AUTO: 359 K/UL — SIGNIFICANT CHANGE UP (ref 150–400)
PO2 BLDV: 21 MMHG — LOW (ref 25–45)
POTASSIUM BLDV-SCNC: 3.7 MMOL/L — SIGNIFICANT CHANGE UP (ref 3.5–5)
POTASSIUM SERPL-MCNC: 3.8 MMOL/L — SIGNIFICANT CHANGE UP (ref 3.5–5.3)
POTASSIUM SERPL-SCNC: 3.8 MMOL/L — SIGNIFICANT CHANGE UP (ref 3.5–5.3)
PROT SERPL-MCNC: 7.9 G/DL — SIGNIFICANT CHANGE UP (ref 6–8.3)
PROTHROM AB SERPL-ACNC: 20.7 SEC — HIGH (ref 9.8–12.7)
RBC # BLD: 3.49 M/UL — LOW (ref 3.8–5.2)
RBC # FLD: 15.9 % — HIGH (ref 10.3–14.5)
SAO2 % BLDV: 29 % — LOW (ref 67–88)
SODIUM SERPL-SCNC: 141 MMOL/L — SIGNIFICANT CHANGE UP (ref 135–145)
WBC # BLD: 9.5 K/UL — SIGNIFICANT CHANGE UP (ref 3.8–10.5)
WBC # FLD AUTO: 9.5 K/UL — SIGNIFICANT CHANGE UP (ref 3.8–10.5)

## 2018-01-16 PROCEDURE — 74177 CT ABD & PELVIS W/CONTRAST: CPT | Mod: 26

## 2018-01-16 PROCEDURE — 99285 EMERGENCY DEPT VISIT HI MDM: CPT

## 2018-01-16 RX ORDER — POTASSIUM CHLORIDE 20 MEQ
1 PACKET (EA) ORAL
Qty: 0 | Refills: 0 | COMMUNITY

## 2018-01-16 RX ORDER — SODIUM CHLORIDE 9 MG/ML
1000 INJECTION, SOLUTION INTRAVENOUS
Qty: 0 | Refills: 0 | Status: DISCONTINUED | OUTPATIENT
Start: 2018-01-16 | End: 2018-01-17

## 2018-01-16 RX ORDER — HEPARIN SODIUM 5000 [USP'U]/ML
5000 INJECTION INTRAVENOUS; SUBCUTANEOUS EVERY 8 HOURS
Qty: 0 | Refills: 0 | Status: DISCONTINUED | OUTPATIENT
Start: 2018-01-16 | End: 2018-01-21

## 2018-01-16 RX ORDER — ACETAMINOPHEN 500 MG
1000 TABLET ORAL ONCE
Qty: 0 | Refills: 0 | Status: COMPLETED | OUTPATIENT
Start: 2018-01-16 | End: 2018-01-16

## 2018-01-16 RX ORDER — ONDANSETRON 8 MG/1
4 TABLET, FILM COATED ORAL ONCE
Qty: 0 | Refills: 0 | Status: COMPLETED | OUTPATIENT
Start: 2018-01-16 | End: 2018-01-16

## 2018-01-16 RX ORDER — PANTOPRAZOLE SODIUM 20 MG/1
40 TABLET, DELAYED RELEASE ORAL ONCE
Qty: 0 | Refills: 0 | Status: COMPLETED | OUTPATIENT
Start: 2018-01-16 | End: 2018-01-16

## 2018-01-16 RX ORDER — ACETAMINOPHEN 500 MG
650 TABLET ORAL EVERY 6 HOURS
Qty: 0 | Refills: 0 | Status: DISCONTINUED | OUTPATIENT
Start: 2018-01-16 | End: 2018-01-17

## 2018-01-16 RX ORDER — SODIUM CHLORIDE 9 MG/ML
1000 INJECTION INTRAMUSCULAR; INTRAVENOUS; SUBCUTANEOUS ONCE
Qty: 0 | Refills: 0 | Status: COMPLETED | OUTPATIENT
Start: 2018-01-16 | End: 2018-01-16

## 2018-01-16 RX ADMIN — ONDANSETRON 4 MILLIGRAM(S): 8 TABLET, FILM COATED ORAL at 15:16

## 2018-01-16 RX ADMIN — ONDANSETRON 4 MILLIGRAM(S): 8 TABLET, FILM COATED ORAL at 20:50

## 2018-01-16 RX ADMIN — SODIUM CHLORIDE 100 MILLILITER(S): 9 INJECTION, SOLUTION INTRAVENOUS at 19:10

## 2018-01-16 RX ADMIN — SODIUM CHLORIDE 100 MILLILITER(S): 9 INJECTION, SOLUTION INTRAVENOUS at 18:17

## 2018-01-16 RX ADMIN — Medication 400 MILLIGRAM(S): at 15:56

## 2018-01-16 RX ADMIN — SODIUM CHLORIDE 100 MILLILITER(S): 9 INJECTION, SOLUTION INTRAVENOUS at 22:06

## 2018-01-16 RX ADMIN — PANTOPRAZOLE SODIUM 40 MILLIGRAM(S): 20 TABLET, DELAYED RELEASE ORAL at 15:56

## 2018-01-16 RX ADMIN — SODIUM CHLORIDE 1000 MILLILITER(S): 9 INJECTION INTRAMUSCULAR; INTRAVENOUS; SUBCUTANEOUS at 15:16

## 2018-01-16 RX ADMIN — Medication 1000 MILLIGRAM(S): at 18:17

## 2018-01-16 NOTE — H&P ADULT - HISTORY OF PRESENT ILLNESS
47 yr old morbidly obese female, BMI 62,  with  HTN , HLD, controlled DM2, Diabetic Peripheral Neuropathy, h/o PE/DVT ( 2014)  On coumadin, YVONNE on CPAP, Anemia, hypothyroidism, GERD, s/p laparoscopic vertical sleeve gastrectomy on 11/1/17. She had been recovering well after surgery until yesterday morning when she notes that she is unable to take PO and has had repeated episodes of nausea, emesis and reflux. She last had a bowel movement last week and states that she is no longer passing flatus.  The patient denies fever, chills; chest pain, SOB, palpitation; dizziness, weakness; and bladder problems.

## 2018-01-16 NOTE — ED ADULT NURSE NOTE - OBJECTIVE STATEMENT
47 year old morbidly obese female a/ox3 ambulatory with rolling walker private aide at beside hx of gastric bypass in November 2017 presenting to ed with mid epigastric pain and vomiting since yesterday morning. no sob/dypsnea/cp/fever/chills/diarrhea no change in bladder

## 2018-01-16 NOTE — ED ADULT NURSE NOTE - PMH
Diabetic peripheral neuropathy  severe  GERD (gastroesophageal reflux disease)    History of DVT (deep vein thrombosis)  2014  History of pulmonary embolism  2014 x 2 -1 in march, 1 in July - on coumadin  Hyperlipidemia    Hypertension    Hypothyroidism    Iron deficiency anemia    Menorrhagia  improved  Morbid obesity with BMI of 60.0-69.9, adult  BMI 62  YVONNE on CPAP  severe, not using CPAP  Osteoarthritis    Type 2 diabetes mellitus  Hg A1C 4.9 %  ( 9/12/17), diagnosed in 2011  UTI (urinary tract infection)  9/1/17 positive urine cx, was treatead with  pencillin from 9/8/17-9/15/17  Vertigo  chronic, without changes. Patient was evaluated in the past, etiology unknown.

## 2018-01-16 NOTE — H&P ADULT - NSHPREVIEWOFSYSTEMS_GEN_ALL_CORE
The patient denies fever, chills; chest pain, SOB, palpitation; dizziness, weakness; and bladder problems.

## 2018-01-16 NOTE — H&P ADULT - ATTENDING COMMENTS
I saw and examined the patient, and reviewed  the history and data (at the time of presentation to the ED) with the staff  Agree with note which was also reviewed and edited where appropriate.  D/W patient, residents and Fellow.    Food bolus obstruction, r/o anatomic considerations vs dietary non-adherence.  admission and w/u as above.

## 2018-01-16 NOTE — H&P ADULT - NSHPLABSRESULTS_GEN_ALL_CORE
8.4    9.5   )-----------( 359      ( 16 Jan 2018 14:49 )             27.9     01-16    141  |  100  |  8   ----------------------------<  85  3.8   |  26  |  0.62    Ca    8.9      16 Jan 2018 14:49    TPro  7.9  /  Alb  3.9  /  TBili  0.4  /  DBili  x   /  AST  27  /  ALT  26  /  AlkPhos  74  01-16    PT/INR - ( 16 Jan 2018 14:49 )   PT: 20.7 sec;   INR: 1.87 ratio         PTT - ( 16 Jan 2018 14:49 )  PTT:34.4 sec  CAPILLARY BLOOD GLUCOSE          MEDICATIONS  (STANDING):  sodium chloride 0.9% 1000 milliLiter(s) (100 mL/Hr) IV Continuous <Continuous>    MEDICATIONS  (PRN):

## 2018-01-16 NOTE — ED PROVIDER NOTE - CARE PLAN
Principal Discharge DX:	Abdominal pain Principal Discharge DX:	Abdominal pain  Secondary Diagnosis:	Gastric outlet obstruction

## 2018-01-16 NOTE — ED PROVIDER NOTE - OBJECTIVE STATEMENT
47 F w Hx of gastric sleeve in November 2017 with Dr Henao presents w complaint of vomiting and epigastric pain/burning sensation which has been present since yesterday morning. She has not been able to hold down any fluids since. She denies diarrhea.

## 2018-01-16 NOTE — H&P ADULT - NSHPPHYSICALEXAM_GEN_ALL_CORE
Gen: WD, WN, in no acute distress.  Lungs: CTA B/L.  Cor: RRR, S1 S2, No M/G/R.  Abd: Soft, ND, NT   Ext: No clubbing, no cyanosis, no edema.  Neuro: A/Ox3. Gen: WD, WN, in no acute distress.  Lungs: CTA B/L.  Cor: RRR, S1 S2, No M/G/R.  Abd: Soft, ND, NT. Incisions healing, c/d/i  Ext: No clubbing, no cyanosis, no edema.  Neuro: A/Ox3.

## 2018-01-16 NOTE — ED PROVIDER NOTE - ATTENDING CONTRIBUTION TO CARE
Attending MD Wan.  Agree with above.  Pt is a 47 yr old female with pmhx of gastric sleeve performed in November by Dr. Tong.  Pt has been vomiting with epigastric pain since yesterday morning.  PT sent to ED by surg.  Surg req banana bag and PPI and CT abd/pelvis.  Abdomen soft with focal epigastric TTP.  Pt is morbidly obese.  Abdomen is soft, non-disteneded.  Pt is alert and oriented in NAD. She has mild LUQ/epigastric TTP.

## 2018-01-16 NOTE — ED PROVIDER NOTE - PROGRESS NOTE DETAILS
[employer]:  Repeat calls to surgery and radiology re: attempt to protocol CT scan.  Have spoken to radiology and to surgery repeatedly who keep stating they are attempting to protocol studies. Patient still with nausea, drinking contrast for study, will medicate and follow up study. RGUJRAL Patient still with nausea, drinking contrast for study, will medicate and follow up study. RGUJTAVIA  Patient with CT findings reviewed, admitted to surgery for GI scope tomorrow. TIFFANI [employer]:  Repeat calls to surgery and radiology re: attempt to protocol CT scan.  Have spoken to radiology and to surgery repeatedly who keep stating they are attempting to protocol studies.  Signed out to incoming team in stable condition with radiology/surgery aware that pt needs evaluation for gastric sleeve complication concern and pending surg/radiology discussion re: protocol of study.  PT remains stable in NAD at time of signout to incoming team.

## 2018-01-17 DIAGNOSIS — Z86.718 PERSONAL HISTORY OF OTHER VENOUS THROMBOSIS AND EMBOLISM: ICD-10-CM

## 2018-01-17 DIAGNOSIS — F41.9 ANXIETY DISORDER, UNSPECIFIED: ICD-10-CM

## 2018-01-17 DIAGNOSIS — K31.1 ADULT HYPERTROPHIC PYLORIC STENOSIS: ICD-10-CM

## 2018-01-17 DIAGNOSIS — G47.33 OBSTRUCTIVE SLEEP APNEA (ADULT) (PEDIATRIC): ICD-10-CM

## 2018-01-17 DIAGNOSIS — E78.5 HYPERLIPIDEMIA, UNSPECIFIED: ICD-10-CM

## 2018-01-17 DIAGNOSIS — E03.9 HYPOTHYROIDISM, UNSPECIFIED: ICD-10-CM

## 2018-01-17 DIAGNOSIS — E11.9 TYPE 2 DIABETES MELLITUS WITHOUT COMPLICATIONS: ICD-10-CM

## 2018-01-17 DIAGNOSIS — D50.9 IRON DEFICIENCY ANEMIA, UNSPECIFIED: ICD-10-CM

## 2018-01-17 DIAGNOSIS — I10 ESSENTIAL (PRIMARY) HYPERTENSION: ICD-10-CM

## 2018-01-17 LAB
ANION GAP SERPL CALC-SCNC: 11 MMOL/L — SIGNIFICANT CHANGE UP (ref 5–17)
BUN SERPL-MCNC: 9 MG/DL — SIGNIFICANT CHANGE UP (ref 7–23)
CALCIUM SERPL-MCNC: 8.2 MG/DL — LOW (ref 8.4–10.5)
CHLORIDE SERPL-SCNC: 103 MMOL/L — SIGNIFICANT CHANGE UP (ref 96–108)
CO2 SERPL-SCNC: 27 MMOL/L — SIGNIFICANT CHANGE UP (ref 22–31)
CREAT SERPL-MCNC: 0.66 MG/DL — SIGNIFICANT CHANGE UP (ref 0.5–1.3)
GLUCOSE BLDC GLUCOMTR-MCNC: 133 MG/DL — HIGH (ref 70–99)
GLUCOSE BLDC GLUCOMTR-MCNC: 78 MG/DL — SIGNIFICANT CHANGE UP (ref 70–99)
GLUCOSE BLDC GLUCOMTR-MCNC: 88 MG/DL — SIGNIFICANT CHANGE UP (ref 70–99)
GLUCOSE BLDC GLUCOMTR-MCNC: 97 MG/DL — SIGNIFICANT CHANGE UP (ref 70–99)
GLUCOSE SERPL-MCNC: 91 MG/DL — SIGNIFICANT CHANGE UP (ref 70–99)
HBA1C BLD-MCNC: 4.4 % — SIGNIFICANT CHANGE UP (ref 4–5.6)
HCG UR QL: NEGATIVE — SIGNIFICANT CHANGE UP
HCT VFR BLD CALC: 22.6 % — LOW (ref 34.5–45)
HCT VFR BLD CALC: 23.8 % — LOW (ref 34.5–45)
HGB BLD-MCNC: 7.2 G/DL — LOW (ref 11.5–15.5)
HGB BLD-MCNC: 7.6 G/DL — LOW (ref 11.5–15.5)
MAGNESIUM SERPL-MCNC: 2.2 MG/DL — SIGNIFICANT CHANGE UP (ref 1.6–2.6)
MCHC RBC-ENTMCNC: 25.3 PG — LOW (ref 27–34)
MCHC RBC-ENTMCNC: 25.6 PG — LOW (ref 27–34)
MCHC RBC-ENTMCNC: 32 GM/DL — SIGNIFICANT CHANGE UP (ref 32–36)
MCHC RBC-ENTMCNC: 32 GM/DL — SIGNIFICANT CHANGE UP (ref 32–36)
MCV RBC AUTO: 78.9 FL — LOW (ref 80–100)
MCV RBC AUTO: 79.9 FL — LOW (ref 80–100)
PHOSPHATE SERPL-MCNC: 3.4 MG/DL — SIGNIFICANT CHANGE UP (ref 2.5–4.5)
PLATELET # BLD AUTO: 199 K/UL — SIGNIFICANT CHANGE UP (ref 150–400)
PLATELET # BLD AUTO: 298 K/UL — SIGNIFICANT CHANGE UP (ref 150–400)
POTASSIUM SERPL-MCNC: 3.6 MMOL/L — SIGNIFICANT CHANGE UP (ref 3.5–5.3)
POTASSIUM SERPL-SCNC: 3.6 MMOL/L — SIGNIFICANT CHANGE UP (ref 3.5–5.3)
RBC # BLD: 2.87 M/UL — LOW (ref 3.8–5.2)
RBC # BLD: 2.97 M/UL — LOW (ref 3.8–5.2)
RBC # FLD: 15.7 % — HIGH (ref 10.3–14.5)
RBC # FLD: 15.8 % — HIGH (ref 10.3–14.5)
SODIUM SERPL-SCNC: 141 MMOL/L — SIGNIFICANT CHANGE UP (ref 135–145)
WBC # BLD: 5.5 K/UL — SIGNIFICANT CHANGE UP (ref 3.8–10.5)
WBC # BLD: 6.2 K/UL — SIGNIFICANT CHANGE UP (ref 3.8–10.5)
WBC # FLD AUTO: 5.5 K/UL — SIGNIFICANT CHANGE UP (ref 3.8–10.5)
WBC # FLD AUTO: 6.2 K/UL — SIGNIFICANT CHANGE UP (ref 3.8–10.5)

## 2018-01-17 PROCEDURE — 99223 1ST HOSP IP/OBS HIGH 75: CPT

## 2018-01-17 PROCEDURE — 43235 EGD DIAGNOSTIC BRUSH WASH: CPT | Mod: GC

## 2018-01-17 PROCEDURE — 99222 1ST HOSP IP/OBS MODERATE 55: CPT | Mod: 25,GC

## 2018-01-17 RX ORDER — BUPROPION HYDROCHLORIDE 150 MG/1
150 TABLET, EXTENDED RELEASE ORAL DAILY
Qty: 0 | Refills: 0 | Status: DISCONTINUED | OUTPATIENT
Start: 2018-01-17 | End: 2018-01-17

## 2018-01-17 RX ORDER — INSULIN LISPRO 100/ML
VIAL (ML) SUBCUTANEOUS EVERY 6 HOURS
Qty: 0 | Refills: 0 | Status: DISCONTINUED | OUTPATIENT
Start: 2018-01-17 | End: 2018-01-20

## 2018-01-17 RX ORDER — METOCLOPRAMIDE HCL 10 MG
10 TABLET ORAL ONCE
Qty: 0 | Refills: 0 | Status: COMPLETED | OUTPATIENT
Start: 2018-01-17 | End: 2018-01-17

## 2018-01-17 RX ORDER — DEXTROSE 50 % IN WATER 50 %
25 SYRINGE (ML) INTRAVENOUS ONCE
Qty: 0 | Refills: 0 | Status: DISCONTINUED | OUTPATIENT
Start: 2018-01-17 | End: 2018-01-17

## 2018-01-17 RX ORDER — ERYTHROMYCIN ETHYLSUCCINATE 400 MG
250 TABLET ORAL EVERY 8 HOURS
Qty: 0 | Refills: 0 | Status: DISCONTINUED | OUTPATIENT
Start: 2018-01-17 | End: 2018-01-17

## 2018-01-17 RX ORDER — HYDROMORPHONE HYDROCHLORIDE 2 MG/ML
0.5 INJECTION INTRAMUSCULAR; INTRAVENOUS; SUBCUTANEOUS ONCE
Qty: 0 | Refills: 0 | Status: DISCONTINUED | OUTPATIENT
Start: 2018-01-17 | End: 2018-01-17

## 2018-01-17 RX ORDER — INSULIN LISPRO 100/ML
VIAL (ML) SUBCUTANEOUS
Qty: 0 | Refills: 0 | Status: DISCONTINUED | OUTPATIENT
Start: 2018-01-17 | End: 2018-01-17

## 2018-01-17 RX ORDER — GLUCAGON INJECTION, SOLUTION 0.5 MG/.1ML
1 INJECTION, SOLUTION SUBCUTANEOUS ONCE
Qty: 0 | Refills: 0 | Status: DISCONTINUED | OUTPATIENT
Start: 2018-01-17 | End: 2018-01-17

## 2018-01-17 RX ORDER — DEXTROSE 50 % IN WATER 50 %
12.5 SYRINGE (ML) INTRAVENOUS ONCE
Qty: 0 | Refills: 0 | Status: DISCONTINUED | OUTPATIENT
Start: 2018-01-17 | End: 2018-01-17

## 2018-01-17 RX ORDER — POTASSIUM CHLORIDE 20 MEQ
10 PACKET (EA) ORAL
Qty: 0 | Refills: 0 | Status: COMPLETED | OUTPATIENT
Start: 2018-01-17 | End: 2018-01-17

## 2018-01-17 RX ORDER — DEXTROSE 50 % IN WATER 50 %
1 SYRINGE (ML) INTRAVENOUS ONCE
Qty: 0 | Refills: 0 | Status: DISCONTINUED | OUTPATIENT
Start: 2018-01-17 | End: 2018-01-17

## 2018-01-17 RX ORDER — SODIUM CHLORIDE 9 MG/ML
1000 INJECTION, SOLUTION INTRAVENOUS
Qty: 0 | Refills: 0 | Status: DISCONTINUED | OUTPATIENT
Start: 2018-01-17 | End: 2018-01-17

## 2018-01-17 RX ORDER — DEXTROSE 50 % IN WATER 50 %
25 SYRINGE (ML) INTRAVENOUS ONCE
Qty: 0 | Refills: 0 | Status: DISCONTINUED | OUTPATIENT
Start: 2018-01-17 | End: 2018-01-19

## 2018-01-17 RX ORDER — LEVOTHYROXINE SODIUM 125 MCG
137 TABLET ORAL DAILY
Qty: 0 | Refills: 0 | Status: DISCONTINUED | OUTPATIENT
Start: 2018-01-17 | End: 2018-01-17

## 2018-01-17 RX ORDER — ACETAMINOPHEN 500 MG
1000 TABLET ORAL ONCE
Qty: 0 | Refills: 0 | Status: COMPLETED | OUTPATIENT
Start: 2018-01-17 | End: 2018-01-17

## 2018-01-17 RX ORDER — HYDROMORPHONE HYDROCHLORIDE 2 MG/ML
0.5 INJECTION INTRAMUSCULAR; INTRAVENOUS; SUBCUTANEOUS
Qty: 0 | Refills: 0 | Status: DISCONTINUED | OUTPATIENT
Start: 2018-01-17 | End: 2018-01-21

## 2018-01-17 RX ORDER — PANTOPRAZOLE SODIUM 20 MG/1
40 TABLET, DELAYED RELEASE ORAL DAILY
Qty: 0 | Refills: 0 | Status: DISCONTINUED | OUTPATIENT
Start: 2018-01-17 | End: 2018-01-21

## 2018-01-17 RX ORDER — DULOXETINE HYDROCHLORIDE 30 MG/1
60 CAPSULE, DELAYED RELEASE ORAL DAILY
Qty: 0 | Refills: 0 | Status: DISCONTINUED | OUTPATIENT
Start: 2018-01-17 | End: 2018-01-17

## 2018-01-17 RX ORDER — LEVOTHYROXINE SODIUM 125 MCG
69 TABLET ORAL AT BEDTIME
Qty: 0 | Refills: 0 | Status: DISCONTINUED | OUTPATIENT
Start: 2018-01-17 | End: 2018-01-21

## 2018-01-17 RX ORDER — DEXTROSE MONOHYDRATE, SODIUM CHLORIDE, AND POTASSIUM CHLORIDE 50; .745; 4.5 G/1000ML; G/1000ML; G/1000ML
1000 INJECTION, SOLUTION INTRAVENOUS
Qty: 0 | Refills: 0 | Status: DISCONTINUED | OUTPATIENT
Start: 2018-01-17 | End: 2018-01-20

## 2018-01-17 RX ADMIN — HYDROMORPHONE HYDROCHLORIDE 0.5 MILLIGRAM(S): 2 INJECTION INTRAMUSCULAR; INTRAVENOUS; SUBCUTANEOUS at 15:44

## 2018-01-17 RX ADMIN — DEXTROSE MONOHYDRATE, SODIUM CHLORIDE, AND POTASSIUM CHLORIDE 125 MILLILITER(S): 50; .745; 4.5 INJECTION, SOLUTION INTRAVENOUS at 06:10

## 2018-01-17 RX ADMIN — Medication 400 MILLIGRAM(S): at 06:08

## 2018-01-17 RX ADMIN — HYDROMORPHONE HYDROCHLORIDE 0.5 MILLIGRAM(S): 2 INJECTION INTRAMUSCULAR; INTRAVENOUS; SUBCUTANEOUS at 07:48

## 2018-01-17 RX ADMIN — HYDROMORPHONE HYDROCHLORIDE 0.5 MILLIGRAM(S): 2 INJECTION INTRAMUSCULAR; INTRAVENOUS; SUBCUTANEOUS at 23:32

## 2018-01-17 RX ADMIN — Medication 1000 MILLIGRAM(S): at 06:28

## 2018-01-17 RX ADMIN — HYDROMORPHONE HYDROCHLORIDE 0.5 MILLIGRAM(S): 2 INJECTION INTRAMUSCULAR; INTRAVENOUS; SUBCUTANEOUS at 15:14

## 2018-01-17 RX ADMIN — HYDROMORPHONE HYDROCHLORIDE 0.5 MILLIGRAM(S): 2 INJECTION INTRAMUSCULAR; INTRAVENOUS; SUBCUTANEOUS at 01:05

## 2018-01-17 RX ADMIN — Medication 69 MICROGRAM(S): at 21:15

## 2018-01-17 RX ADMIN — Medication 10 MILLIGRAM(S): at 21:16

## 2018-01-17 RX ADMIN — HYDROMORPHONE HYDROCHLORIDE 0.5 MILLIGRAM(S): 2 INJECTION INTRAMUSCULAR; INTRAVENOUS; SUBCUTANEOUS at 20:28

## 2018-01-17 RX ADMIN — HEPARIN SODIUM 5000 UNIT(S): 5000 INJECTION INTRAVENOUS; SUBCUTANEOUS at 21:15

## 2018-01-17 RX ADMIN — PANTOPRAZOLE SODIUM 40 MILLIGRAM(S): 20 TABLET, DELAYED RELEASE ORAL at 11:30

## 2018-01-17 RX ADMIN — HYDROMORPHONE HYDROCHLORIDE 0.5 MILLIGRAM(S): 2 INJECTION INTRAMUSCULAR; INTRAVENOUS; SUBCUTANEOUS at 08:18

## 2018-01-17 RX ADMIN — HEPARIN SODIUM 5000 UNIT(S): 5000 INJECTION INTRAVENOUS; SUBCUTANEOUS at 15:16

## 2018-01-17 RX ADMIN — HYDROMORPHONE HYDROCHLORIDE 0.5 MILLIGRAM(S): 2 INJECTION INTRAMUSCULAR; INTRAVENOUS; SUBCUTANEOUS at 01:25

## 2018-01-17 RX ADMIN — HEPARIN SODIUM 5000 UNIT(S): 5000 INJECTION INTRAVENOUS; SUBCUTANEOUS at 05:28

## 2018-01-17 RX ADMIN — Medication 100 MILLIEQUIVALENT(S): at 15:15

## 2018-01-17 RX ADMIN — Medication 100 MILLIEQUIVALENT(S): at 11:30

## 2018-01-17 RX ADMIN — Medication 100 MILLIEQUIVALENT(S): at 20:01

## 2018-01-17 RX ADMIN — HYDROMORPHONE HYDROCHLORIDE 0.5 MILLIGRAM(S): 2 INJECTION INTRAMUSCULAR; INTRAVENOUS; SUBCUTANEOUS at 23:52

## 2018-01-17 RX ADMIN — HYDROMORPHONE HYDROCHLORIDE 0.5 MILLIGRAM(S): 2 INJECTION INTRAMUSCULAR; INTRAVENOUS; SUBCUTANEOUS at 20:08

## 2018-01-17 NOTE — CONSULT NOTE ADULT - PROBLEM SELECTOR RECOMMENDATION 7
Hx of PE/DVT in 2013  Continue with PO coumadin 7.5 mg qd when tolerating PO   Check INR in AM  Does not require bridging therapy

## 2018-01-17 NOTE — CHART NOTE - NSCHARTNOTEFT_GEN_A_CORE
Surgery Post-Op Note    Pre-Op Dx:   Gastric outlet obstruction   Procedure:   endoscopy   Surgeon:       GI    Subjective:   Pt seen and examined at the bedside. Pt w/ no complaints. Denies fever, CP, SOB and NV. Pt asking to eat. Discussed with patient why it would be unwise to have PO intake at this time     Vital Signs Last 24 Hrs  T(C): 36.7 (17 Jan 2018 22:16), Max: 36.7 (17 Jan 2018 10:23)  T(F): 98 (17 Jan 2018 22:16), Max: 98.1 (17 Jan 2018 10:23)  HR: 85 (17 Jan 2018 22:16) (81 - 95)  BP: 124/67 (17 Jan 2018 22:16) (109/67 - 131/81)  BP(mean): --  RR: 18 (17 Jan 2018 22:16) (18 - 18)  SpO2: 95% (17 Jan 2018 22:16) (93% - 99%)    Physical Exam:  General: NAD, resting comfortably in bed  Neuro: A/O x 3, no focal deficits  Pulmonary: Nonlabored breathing, no respiratory distress  Cardiovascular: NSR  Abdominal: soft, NT, ND  Extremities: WWP        LABS:                        7.6    6.2   )-----------( 199      ( 17 Jan 2018 12:51 )             23.8     01-17    141  |  103  |  9   ----------------------------<  91  3.6   |  27  |  0.66    Ca    8.2<L>      17 Jan 2018 09:54  Phos  3.4     01-17  Mg     2.2     01-17    TPro  7.9  /  Alb  3.9  /  TBili  0.4  /  DBili  x   /  AST  27  /  ALT  26  /  AlkPhos  74  01-16    PT/INR - ( 16 Jan 2018 14:49 )   PT: 20.7 sec;   INR: 1.87 ratio         PTT - ( 16 Jan 2018 14:49 )  PTT:34.4 sec  CAPILLARY BLOOD GLUCOSE      POCT Blood Glucose.: 88 mg/dL (17 Jan 2018 18:45)  POCT Blood Glucose.: 97 mg/dL (17 Jan 2018 11:35)  POCT Blood Glucose.: 78 mg/dL (17 Jan 2018 05:13)  POCT Blood Glucose.: 94 mg/dL (16 Jan 2018 23:48)      LIVER FUNCTIONS - ( 16 Jan 2018 14:49 )  Alb: 3.9 g/dL / Pro: 7.9 g/dL / ALK PHOS: 74 U/L / ALT: 26 U/L RC / AST: 27 U/L / GGT: x                 Radiology and Additional Studies:    Assessment:47y Female s/p above procedure    Plan:  IVF, Diet: NPO  Pain/nausea control PRN

## 2018-01-17 NOTE — CONSULT NOTE ADULT - PROBLEM SELECTOR RECOMMENDATION 9
Pt with hx of sleeve gastrectomy with distention of fundus/body and suspicion for gastric stricture on imaging findings  Keep NPO   Continue with IVF  Continue with IV protonix 40mg qd  GI follow up, likely for EGD today to remove debris   Sx follow up Pt with depression/ anxiety  Duloxetine 60mg qd and buproprion 75mg 2 tabs bid on hold, resume when tolerating PO

## 2018-01-17 NOTE — CONSULT NOTE ADULT - SUBJECTIVE AND OBJECTIVE BOX
Chief Complaint:  Patient is a 47y old  Female who presents with a chief complaint of inability to tolerate PO (2018 21:43)      HPI:  47 year old morbidly obese female (BMI 62) with HTN, HLD, controlled DM2 (complicated by peripheral neuropathy), h/o PE/DVT (in , on coumadin), YVONNE on CPAP, anemia, hypothyroidism, GERD, s/p laparoscopic vertical sleeve gastrectomy (at [facility] on 17, lost 70lbs so far) presented to the emergency room with chief complain of nausea, vomiting and inability to tolerate po for 3 days.     Patient states she was doing well after the surgery but since 3 days, she has been unable to tolerate po and throws up everything she eats, about 15 minutes after ingestion. She also has not had a bowel movement in 10 days and states that she is no longer passing flatus. She denies abdominal pain but has had some epigastric discomfort.     She denies fever, chills, chest pain, SOB, palpitation, dizziness, weakness.     Allergies:  No Known Allergies      Home Medications:  Patient Currently Takes Medications as of 2017 14:26 documented in Structured Notes  · 	omeprazole 40 mg oral delayed release capsule: 1 cap(s) orally once a day. Open capsule and mix in apple sauce  · 	acetaminophen 160 mg/5 mL oral liquid: 10 milliliter(s) orally every 6 hours, As Needed   · 	enalapril 2.5 mg oral tablet: 1 tab(s) orally once a day. Crush and mix in apple sauce  · 	warfarin 7.5 mg oral tablet: 1 tab(s) orally once a day, CRUSH AND MIX IN APPLE SAUCE  · 	Lyrica 75 mg oral capsule: 1 cap(s) orally 3 times a day -  open capsule AND MIX IN APLPE SAUCE  · 	buPROPion 75 mg oral tablet: 2 tab(s) orally 2 times a day - CRUSH AND MIX IN APLPE SAUCE  · 	DULoxetine 60 mg oral delayed release capsule: 1 cap(s) orally once a day, for leg pain - may open and mix with unsweetened apple sauce  · 	meclizine 25 mg oral tablet: 1 tab(s) orally 3 times a day - CRUSH AND MIX IN APLPE SAUCE  · 	atorvastatin 40 mg oral tablet: 1 tab(s) orally once a day, noon - CRUSH AND MIX IN APLPE SAUCE  · 	ferrous sulfate 325 mg (65 mg elemental iron) oral tablet: 1 tab(s) orally 3 times a day - CRUSH AND MIX IN APLPE SAUCE  · 	levothyroxine 137 mcg (0.137 mg) oral tablet: 1 tab(s) orally once a day - CRUSH AND MIX IN APLPE SAUCE  · 	folic acid 1 mg oral tablet: 1 tab(s) orally once a day - CRUSH AND MIX IN APLPE SAUCE  · 	Vitamin B-12 1000 mcg oral tablet: 1 tab(s) orally once a day - CRUSH AND MIX IN APLPE SAUCE  · 	Vitamin B6 100 mg oral tablet: 1 tab(s) orally once a day - CRUSH AND MIX IN APLPE SAUCE  · 	furosemide 40 mg oral tablet: 1 tab(s) orally once a day, LE edema  · 	Victoza 18 mg/3 mL subcutaneous solution: 1 dose(s) subcutaneous once a day      Hospital Medications:  dextrose 5% + sodium chloride 0.45% with potassium chloride 20 mEq/L 1000 milliLiter(s) IV Continuous <Continuous>  dextrose 50% Injectable 25 Gram(s) IV Push once  heparin  Injectable 5000 Unit(s) SubCutaneous every 8 hours  HYDROmorphone  Injectable 0.5 milliGRAM(s) IV Push every 3 hours PRN  insulin lispro (HumaLOG) corrective regimen sliding scale   SubCutaneous every 6 hours  levothyroxine Injectable 69 MICROGram(s) IV Push at bedtime  pantoprazole  Injectable 40 milliGRAM(s) IV Push daily  potassium chloride  10 mEq/100 mL IVPB 10 milliEquivalent(s) IV Intermittent every 1 hour      PMHX/PSHX:  History of DVT (deep vein thrombosis)  History of pulmonary embolism  Diabetic peripheral neuropathy  Iron deficiency anemia  UTI (urinary tract infection)  Morbid obesity with BMI of 60.0-69.9, adult  Menorrhagia  Osteoarthritis  Neuropathy  DVT (deep venous thrombosis)  Hyperlipidemia  YVONNE on CPAP  Type 2 diabetes mellitus  Pulmonary embolism  Hypertension  GERD (gastroesophageal reflux disease)  Vertigo  Diabetes  Hypothyroidism  Morbid obesity  Anemia  History of oophorectomy, unilateral  S/P D&C (status post dilation and curettage)  S/P ovarian cystectomy  No significant past surgical history      Family history:  No pertinent family history in first degree relatives  No significant family history      Social History:     ROS:     General:  No wt loss, fevers, chills, night sweats, fatigue,   Eyes:  Good vision, no reported pain  ENT:  No sore throat, pain, runny nose, dysphagia  CV:  No pain, palpitations, hypo/hypertension  Resp:  No dyspnea, cough, tachypnea, wheezing  GI:  See HPI  :  No pain, bleeding, incontinence, nocturia  Muscle:  No pain, weakness  Neuro:  No weakness, tingling, memory problems  Psych:  No fatigue, insomnia, mood problems, depression  Endocrine:  No polyuria, polydipsia, cold/heat intolerance  Heme:  No petechiae, ecchymosis, easy bruisability  Skin:  No rash, edema      PHYSICAL EXAM:     GENERAL:  Appears stated age, well-groomed, well-nourished, no distress  HEENT:  NC/AT,  conjunctivae clear and pink,  no JVD  CHEST:  Full & symmetric excursion, no increased effort, breath sounds clear  HEART:  Regular rhythm, S1, S2, no murmur/rub/S3/S4, no abdominal bruit, no edema  ABDOMEN:  Soft, non-tender, non-distended, normoactive bowel sounds,  no masses ,  EXTREMITIES:  no cyanosis,clubbing or edema  SKIN:  No rash/erythema/ecchymoses/petechiae/wounds/abscess/warm/dry  NEURO:  Alert, oriented    Vital Signs:  Vital Signs Last 24 Hrs  T(C): 36.7 (2018 10:23), Max: 37.1 (2018 19:29)  T(F): 98.1 (2018 10:23), Max: 98.7 (2018 19:29)  HR: 81 (2018 10:23) (81 - 100)  BP: 115/68 (2018 10:23) (109/67 - 146/93)  BP(mean): --  RR: 18 (2018 10:23) (18 - 22)  SpO2: 93% (2018 10:23) (93% - 100%)  Daily     Daily Weight in k (2018 10:23)    LABS:                        7.2    5.5   )-----------( 298      ( 2018 09:54 )             22.6     -    141  |  103  |  9   ----------------------------<  91  3.6   |  27  |  0.66    Ca    8.2<L>      2018 09:54  Phos  3.4       Mg     2.2         TPro  7.9  /  Alb  3.9  /  TBili  0.4  /  DBili  x   /  AST  27  /  ALT  26  /  AlkPhos  74  -16    LIVER FUNCTIONS - ( 2018 14:49 )  Alb: 3.9 g/dL / Pro: 7.9 g/dL / ALK PHOS: 74 U/L / ALT: 26 U/L RC / AST: 27 U/L / GGT: x           PT/INR - ( 2018 14:49 )   PT: 20.7 sec;   INR: 1.87 ratio         PTT - ( 2018 14:49 )  PTT:34.4 sec    Amylase Serum--      Lipase serum16       Ammonia--      Imaging:      < from: CT Abdomen and Pelvis w/ Oral Cont and w/ IV Cont (18 @ 20:28) >  IMPRESSION: Moderate distention of the gastric fundus/body with debris   and contrast. If there is clinical suspicion for gastric stricture, an   upper GI series can be obtained.    2.5 cm low-attenuation lesion inseparable from the vaginal fornix and   cervix. Additional 4.5 low attenuation lesion of the right ovary. Further   characterization with a pelvic ultrasound is recommended to exclude mass.     < end of copied text >      < from: Upper Endoscopy (17 @ 15:18) >                                       Impression:          - Normal 2nd part of the duodenum.                       - Erythematous mucosa in the stomach. Biopsied.                       - Normal esophagus.  Recommendation:      - Await pathology results. Patient will need a CT-scan to assess if this                        extrinsic compression.                       - Discharge patient to home (via wheelchair).    < end of copied text >
HPI:    47 year old morbidly obese female (BMI 62) with hx of HTN, HLD, controlled DM2 (complicated by peripheral neuropathy), h/o PE/DVT (in 2013, on coumadin), YVONNE on CPAP, anemia, hypothyroidism, GERD, s/p laparoscopic vertical sleeve gastrectomy (at [facility] on 11/1/17, lost 70lbs so far) presents with  nausea, vomiting and inability to tolerate po for 3 days. Last bowel movement over 10 days ago. Denies abdominal pain, reports epigastric discomfort. No chest pain, sob, palpitations, chills. Lives at home alone, uses a wheel chair and walker. Pt found to have imaging findings with distention of fundus/body,  suspicious for gastric stricture.      PAST MEDICAL & SURGICAL HISTORY:  History of DVT (deep vein thrombosis): 2014  History of pulmonary embolism: 2014 x 2 -1 in march, 1 in July - on coumadin  Diabetic peripheral neuropathy: severe  Iron deficiency anemia  UTI (urinary tract infection): 9/1/17 positive urine cx, was treatead with  pencillin from 9/8/17-9/15/17  Morbid obesity with BMI of 60.0-69.9, adult: BMI 62  Menorrhagia: improved  Osteoarthritis  Hyperlipidemia  YVONNE on CPAP: severe, not using CPAP  Type 2 diabetes mellitus: Hg A1C 4.9 %  ( 9/12/17), diagnosed in 2011  Hypertension  GERD (gastroesophageal reflux disease)  Vertigo: chronic, without changes. Patient was evaluated in the past, etiology unknown.  Hypothyroidism  History of oophorectomy, unilateral: right 1999  S/P D&C (status post dilation and curettage)      Review of Systems:   CONSTITUTIONAL: No fever, weight loss, or fatigue  EYES: No eye pain, visual disturbances, or discharge  ENMT:  No difficulty hearing, tinnitus, vertigo; No sinus or throat pain  NECK: No pain or stiffness  RESPIRATORY: No cough, wheezing, chills or hemoptysis; No shortness of breath  CARDIOVASCULAR: No chest pain, palpitations, dizziness, or leg swelling  GASTROINTESTINAL: epigastric discomfort, nausea, vomiting, no hematemesis; No diarrhea or constipation. No melena or hematochezia.  GENITOURINARY: No dysuria, frequency, hematuria, or incontinence  NEUROLOGICAL: No headaches, memory loss, loss of strength, numbness, or tremors  SKIN: No itching, burning, rashes, or lesions   MUSCULOSKELETAL: No joint pain or swelling; No muscle, back, or extremity pain  PSYCHIATRIC: No depression, anxiety, mood swings, or difficulty sleeping  HEME/LYMPH: No easy bruising, or bleeding gums  ALLERY AND IMMUNOLOGIC: No hives or eczema    Allergies    No Known Allergies    Intolerances        Social History:     FAMILY HISTORY:  No pertinent family history in first degree relatives      MEDICATIONS  (STANDING):  dextrose 5% + sodium chloride 0.45% with potassium chloride 20 mEq/L 1000 milliLiter(s) (125 mL/Hr) IV Continuous <Continuous>  dextrose 50% Injectable 25 Gram(s) IV Push once  heparin  Injectable 5000 Unit(s) SubCutaneous every 8 hours  insulin lispro (HumaLOG) corrective regimen sliding scale   SubCutaneous every 6 hours  levothyroxine Injectable 69 MICROGram(s) IV Push at bedtime  pantoprazole  Injectable 40 milliGRAM(s) IV Push daily  potassium chloride  10 mEq/100 mL IVPB 10 milliEquivalent(s) IV Intermittent every 1 hour    MEDICATIONS  (PRN):  HYDROmorphone  Injectable 0.5 milliGRAM(s) IV Push every 3 hours PRN Moderate Pain (4 - 6)        CAPILLARY BLOOD GLUCOSE      POCT Blood Glucose.: 97 mg/dL (17 Jan 2018 11:35)  POCT Blood Glucose.: 78 mg/dL (17 Jan 2018 05:13)  POCT Blood Glucose.: 94 mg/dL (16 Jan 2018 23:48)    I&O's Summary    16 Jan 2018 07:01  -  17 Jan 2018 07:00  --------------------------------------------------------  IN: 725 mL / OUT: 75 mL / NET: 650 mL        PHYSICAL EXAM:  GENERAL: NAD, well-developed, obese   HEAD:  Atraumatic, Normocephalic  EYES: EOMI, PERRLA, conjunctiva and sclera clear  NECK: Supple, No JVD  CHEST/LUNG: Clear to auscultation bilaterally; No wheeze  HEART: Regular rate and rhythm; S1S2  ABDOMEN: Soft, Nontender, Nondistended; Bowel sounds present, obese  EXTREMITIES:  2+ Peripheral Pulses, trace edema le b/l  PSYCH: AAOx3  NEUROLOGY: non-focal  SKIN: No rashes or lesions    LABS:                        7.2    5.5   )-----------( 298      ( 17 Jan 2018 09:54 )             22.6     01-17    141  |  103  |  9   ----------------------------<  91  3.6   |  27  |  0.66    Ca    8.2<L>      17 Jan 2018 09:54  Phos  3.4     01-17  Mg     2.2     01-17    TPro  7.9  /  Alb  3.9  /  TBili  0.4  /  DBili  x   /  AST  27  /  ALT  26  /  AlkPhos  74  01-16    PT/INR - ( 16 Jan 2018 14:49 )   PT: 20.7 sec;   INR: 1.87 ratio         PTT - ( 16 Jan 2018 14:49 )  PTT:34.4 sec          RADIOLOGY & ADDITIONAL TESTS:    Imaging Personally Reviewed:    Consultant(s) Notes Reviewed:  Sx    Care Discussed with Consultants/Other Providers: Sx

## 2018-01-17 NOTE — PROGRESS NOTE ADULT - SUBJECTIVE AND OBJECTIVE BOX
[facility]GERY PROGRESS NOTE (pager #[phone])    SUBJECTIVE: 48yo Woman seen and examined during morning rounds. No acute events overnight; afebrile, VS stable. Pain well controlled with current regimen. Tolerating diet; denies N/V. Reports passing flatus and having bowel movements.     OBJECTIVE:  Vital Signs Last 24 Hrs  T(C): 36.6 (16 Jan 2018 23:25), Max: 37.1 (16 Jan 2018 19:29)  T(F): 97.9 (16 Jan 2018 23:25), Max: 98.7 (16 Jan 2018 19:29)  HR: 95 (16 Jan 2018 23:25) (87 - 100)  BP: 118/71 (16 Jan 2018 23:25) (118/71 - 146/93)  BP(mean): --  RR: 18 (16 Jan 2018 23:25) (18 - 22)  SpO2: 96% (16 Jan 2018 23:25) (96% - 100%)    I&O's Detail    16 Jan 2018 07:01  -  17 Jan 2018 03:20  --------------------------------------------------------  IN:  Total IN: 0 mL    OUT:    Voided: 75 mL  Total OUT: 75 mL    Total NET: -75 mL      Physical Exam:  Gen: Resting in bed, NAD, alert and oriented  Resp: airway patent, respirations unlabored, no increased WOB   Abd: soft, NT/ND    MEDICATIONS  (STANDING):  dextrose 5%. 1000 milliLiter(s) (50 mL/Hr) IV Continuous <Continuous>  dextrose 50% Injectable 12.5 Gram(s) IV Push once  dextrose 50% Injectable 25 Gram(s) IV Push once  dextrose 50% Injectable 25 Gram(s) IV Push once  heparin  Injectable 5000 Unit(s) SubCutaneous every 8 hours  insulin lispro (HumaLOG) corrective regimen sliding scale   SubCutaneous three times a day before meals  levothyroxine Injectable 69 MICROGram(s) IV Push at bedtime  pantoprazole  Injectable 40 milliGRAM(s) IV Push daily  sodium chloride 0.9% 1000 milliLiter(s) (100 mL/Hr) IV Continuous <Continuous>    MEDICATIONS  (PRN):  acetaminophen  IVPB. 1000 milliGRAM(s) IV Intermittent once PRN Mild Pain (1 - 3)  dextrose Gel 1 Dose(s) Oral once PRN Blood Glucose LESS THAN 70 milliGRAM(s)/deciliter  glucagon  Injectable 1 milliGRAM(s) IntraMuscular once PRN Glucose LESS THAN 70 milligrams/deciliter      LABS:                        8.4    9.5   )-----------( 359      ( 16 Jan 2018 14:49 )             27.9       01-16    141  |  100  |  8   ----------------------------<  85  3.8   |  26  |  0.62    Ca    8.9      16 Jan 2018 14:49    TPro  7.9  /  Alb  3.9  /  TBili  0.4  /  DBili  x   /  AST  27  /  ALT  26  /  AlkPhos  74  01-16      IMAGING:    CT Abd IMPRESSION: Moderate distention of the gastric fundus/body with debris and contrast. If there is clinical suspicion for gastric stricture, an upper GI series can be obtained.  2.5 cm low-attenuation lesion inseparable from the vaginal fornix and cervix. Additional 4.5 low attenuation lesion of the right ovary. Further characterization with a pelvic ultrasound is recommended to exclude mass. Bariatric Surgery Progress Note    Post Op Day#:     24 hr events/Subjective:     No acute issues overnight  Pain controlled with medications  Nausea controlled with anti-emetics   Voiding      Procedure:      Vital Signs Last 24 Hrs  T(C): 36.5 (17 Jan 2018 05:17), Max: 37.1 (16 Jan 2018 19:29)  T(F): 97.7 (17 Jan 2018 05:17), Max: 98.7 (16 Jan 2018 19:29)  HR: 87 (17 Jan 2018 05:17) (87 - 100)  BP: 109/67 (17 Jan 2018 05:17) (109/67 - 146/93)  BP(mean): --  RR: 18 (17 Jan 2018 05:17) (18 - 22)  SpO2: 96% (17 Jan 2018 05:17) (96% - 100%)    Weight (kg): 142.9 (01-16 @ 13:41)  I&O's Summary    16 Jan 2018 07:01  -  17 Jan 2018 05:48  --------------------------------------------------------  IN: 0 mL / OUT: 75 mL / NET: -75 mL      I&O's Detail    16 Jan 2018 07:01  -  17 Jan 2018 05:48  --------------------------------------------------------  IN:  Total IN: 0 mL    OUT:    Voided: 75 mL  Total OUT: 75 mL    Total NET: -75 mL          Physical Exam:    General:  Appears stated age, well-groomed, well-nourished, no distress  Chest:  clear breath sounds  Cardiovascular:  Regular rate & rhythm  Abdomen:  Soft, incisions clean, dry intact, tenderness around incisions, no rebound or guarding  Skin:  No rash  Neuro/Psych:  Alert, oriented to time, place and person                           8.4    9.5   )-----------( 359      ( 16 Jan 2018 14:49 )             27.9       01-16    141  |  100  |  8   ----------------------------<  85  3.8   |  26  |  0.62    Ca    8.9      16 Jan 2018 14:49    TPro  7.9  /  Alb  3.9  /  TBili  0.4  /  DBili  x   /  AST  27  /  ALT  26  /  AlkPhos  74  01-16      acetaminophen  IVPB. milliGRAM(s) IV Intermittent once PRN  dextrose 5% + sodium chloride 0.45% with potassium chloride 20 mEq/L milliLiter(s) IV Continuous <Continuous>  heparin  Injectable Unit(s) SubCutaneous every 8 hours  insulin lispro (HumaLOG) corrective regimen sliding scale  SubCutaneous three times a day before meals  levothyroxine Injectable MICROGram(s) IV Push at bedtime  pantoprazole  Injectable milliGRAM(s) IV Push daily          Assessment and Plan:  47y y/o Female s/p , POD #     - Start PO liquid oxycodone PRN for pain  - Continue ambulation   - Encourage Incentive Spirometer use  - Bariatric clears   - Continue chemical and mechanical DVT prophylaxis  - Discharge home once tolerating adequate PO and 8 point discharge criteria met    Discuss with attending Bariatric Surgery Progress Note      24 hr events/Subjective:     No acute issues overnight  Pain controlled with medications  Nausea controlled with anti-emetics   Voiding      Vital Signs Last 24 Hrs  T(C): 36.5 (17 Jan 2018 05:17), Max: 37.1 (16 Jan 2018 19:29)  T(F): 97.7 (17 Jan 2018 05:17), Max: 98.7 (16 Jan 2018 19:29)  HR: 87 (17 Jan 2018 05:17) (87 - 100)  BP: 109/67 (17 Jan 2018 05:17) (109/67 - 146/93)  BP(mean): --  RR: 18 (17 Jan 2018 05:17) (18 - 22)  SpO2: 96% (17 Jan 2018 05:17) (96% - 100%)    Weight (kg): 142.9 (01-16 @ 13:41)  I&O's Summary    16 Jan 2018 07:01  -  17 Jan 2018 05:48  --------------------------------------------------------  IN: 0 mL / OUT: 75 mL / NET: -75 mL      I&O's Detail    16 Jan 2018 07:01  -  17 Jan 2018 05:48  --------------------------------------------------------  IN:  Total IN: 0 mL    OUT:    Voided: 75 mL  Total OUT: 75 mL    Total NET: -75 mL          Physical Exam:    General:  Appears stated age, well-groomed, well-nourished, no distress  Chest:  clear breath sounds  Cardiovascular:  Regular rate & rhythm  Abdomen:  Soft, NT/ND, no rebound or guarding  Skin:  No rash  Neuro/Psych:  Alert, oriented to time, place and person                           8.4    9.5   )-----------( 359      ( 16 Jan 2018 14:49 )             27.9       01-16    141  |  100  |  8   ----------------------------<  85  3.8   |  26  |  0.62    Ca    8.9      16 Jan 2018 14:49    TPro  7.9  /  Alb  3.9  /  TBili  0.4  /  DBili  x   /  AST  27  /  ALT  26  /  AlkPhos  74  01-16      acetaminophen  IVPB. milliGRAM(s) IV Intermittent once PRN  dextrose 5% + sodium chloride 0.45% with potassium chloride 20 mEq/L milliLiter(s) IV Continuous <Continuous>  heparin  Injectable Unit(s) SubCutaneous every 8 hours  insulin lispro (HumaLOG) corrective regimen sliding scale  SubCutaneous three times a day before meals  levothyroxine Injectable MICROGram(s) IV Push at bedtime  pantoprazole  Injectable milliGRAM(s) IV Push daily          Assessment and Plan:  47y y/o Female p/w nausea and possible gastric outlet obstruction hemodynamically stable    - NPO  - Continue ambulation   - Encourage Incentive Spirometer use  - GI consult  - Continue chemical and mechanical DVT prophylaxis  - Discharge home once tolerating adequate PO and 8 point discharge criteria met    Discuss with attending

## 2018-01-17 NOTE — CONSULT NOTE ADULT - PROBLEM SELECTOR RECOMMENDATION 3
Continue with SS  FS stable   Check A1C  Continue to monitor FS  Lyrica 75mg tid on hold for diabetic neuropathy

## 2018-01-17 NOTE — PROGRESS NOTE ADULT - ATTENDING COMMENTS
I saw and examined the patient, and reviewed  the history and data with the patient and staff  Agree with note which was also reviewed and edited where appropriate.  D/W patient, RN, residents and Fellow    Nutrition to see today.  EGD to clear food from stomach, and evaluate anatomy.

## 2018-01-17 NOTE — CONSULT NOTE ADULT - PROBLEM SELECTOR RECOMMENDATION 2
Pt with microcytic anemia/ iron deficiency anemia, with downtrending hgb today  Can check iron studies, folate, b12  Trend h/h, check cbc tonight at 5pm   Transfuse if hgb <7  When tolerating po resume iron sulfate 325mg tid

## 2018-01-17 NOTE — PROGRESS NOTE ADULT - SUBJECTIVE AND OBJECTIVE BOX
Pre-Endoscopy Evaluation      Referring Physician:    Dr. LETTY White                               Procedure: EGD    Indication for Procedure: Nausea/Vomiting    Pertinent History: 47 year old morbidly obese female with PMH of HTN, HLD, DM2 (complicated by peripheral neuropathy),  PE/DVT (in , on coumadin), YVONNE on CPAP, Anemia, Hypothyroidism, GERD, s/p Laparoscopic  Sleeve Gastrectomy (at [facility] on 17, lost 70lbs) presented to the emergency room with chief complain of nausea, vomiting and inability to tolerate po for 3 days.     Sedation by Anesthesia [x]    PAST MEDICAL & SURGICAL HISTORY:  History of DVT (deep vein thrombosis):   History of pulmonary embolism: 2014 x 2 -1 in march, 1 in July - on coumadin  Diabetic peripheral neuropathy: severe  Iron deficiency anemia  UTI (urinary tract infection): 17 positive urine cx, was treatead with  pencillin from 17-9/15/17  Morbid obesity with BMI of 60.0-69.9, adult: BMI 62  Menorrhagia: improved  Osteoarthritis  Hyperlipidemia  YVONNE on CPAP: severe, not using CPAP  Type 2 diabetes mellitus: Hg A1C 4.9 %  ( 17), diagnosed in   Hypertension  GERD (gastroesophageal reflux disease)  Vertigo: chronic, without changes. Patient was evaluated in the past, etiology unknown.  Hypothyroidism  History of oophorectomy, unilateral: right   S/P D&C (status post dilation and curettage)      PMH of Gastroparesis [ ]  Gastric Surgery [X]  Gastric Outlet Obstruction [ ]    Allergies:    No Known Allergies    Intolerances:    Latex allergy: [ ] yes [X] no    Medications:MEDICATIONS  (STANDING):  dextrose 5% + sodium chloride 0.45% with potassium chloride 20 mEq/L 1000 milliLiter(s) (125 mL/Hr) IV Continuous <Continuous>  dextrose 50% Injectable 25 Gram(s) IV Push once  heparin  Injectable 5000 Unit(s) SubCutaneous every 8 hours  insulin lispro (HumaLOG) corrective regimen sliding scale   SubCutaneous every 6 hours  levothyroxine Injectable 69 MICROGram(s) IV Push at bedtime  pantoprazole  Injectable 40 milliGRAM(s) IV Push daily  potassium chloride  10 mEq/100 mL IVPB 10 milliEquivalent(s) IV Intermittent every 1 hour    MEDICATIONS  (PRN):  HYDROmorphone  Injectable 0.5 milliGRAM(s) IV Push every 3 hours PRN Moderate Pain (4 - 6)      Smoking: [ ] yes  [X] no    AICD/PPM: [ ] yes   [X] no    Pertinent lab data:                        7.6    6.2   )-----------( 199      ( 2018 12:51 )             23.8         141  |  103  |  9   ----------------------------<  91  3.6   |  27  |  0.66    Ca    8.2<L>      2018 09:54  Phos  3.4       Mg     2.2         TPro  7.9  /  Alb  3.9  /  TBili  0.4  /  DBili  x   /  AST  27  /  ALT  26  /  AlkPhos  74      PT/INR - ( 2018 14:49 )   PT: 20.7 sec;   INR: 1.87 ratio         PTT - ( 2018 14:49 )  PTT:34.4 sec  < from: Stress Echo Pharmacologic (w/TTE, w/Cont) (17 @ 13:41) >    Patient name: CONY HOUSTON  YOB: 1970   Age: 46 (F)   MR#: [id number]  Study Date: 2017  Location: O/PSonographer: Lauren Finkelstein  Study quality: Technically Difficult  Referring Physician: Aury Burrows M.D.  Blood Pressure: 125/69 mmHg  Height: 163 cm  Weight: 181 kg  BSA: 2.6 m2  Heart Rate: 110 mmHg  ------------------------------------------------------------------------  PROCEDURE: Pharmacologic Stress Echocardiogram with  transthoracic echocardiograms with 2-D, M-Mode and complete  spectral and color flow Doppler.  Verbal consent was obtained for injection of echo contrast.  Following  intravenous injection of contrast, harmonic  imaging was performed.  INDICATION: Encounter for preprocedural cardiovascular  examination (Z01.810)  ------------------------------------------------------------------------  Observations:  Mitral Valve: Normal mitral valve. Minimal mitral  regurgitation.  Aortic Valve/Aorta: Aortic valve leaflet morphology not  well visualized.  Normal aortic root.  Left Atrium: Normal left atrium.  Left Ventricle: Endocardium not well visualized; grossly  normal left ventricular systolic function.  Endocardial  visualization enhanced with intravenous injection of echo  contrast (Definity).  Right Heart: Normal right atrium. The right ventricle is  not well visualized; grossly normal right ventricular  systolic function. Normal tricuspid valve. Minimal  tricuspid regurgitation. Normal pulmonic valve.  Pericardium/Pleura: Normal pericardium with no pericardial  effusion.  ------------------------------------------------------------------------  Pharmacologic Stress Test:  Agent:   Dobutamine Dose: 40  mcg/Kg/min over 25 min.   During peak infusion, Atropine: 1 mg.Baseline HR: 110 bpm  Peak HR: 157 bpm  % MPHR: 90 %  Baseline BP: 125/69 mmHg  Peak BP: 141/68 mmHg  Peak RPP: 22,137 (Rate Pressure Product)  Test terminated: Target heart rate achieved  Baseline EKG: Normal sinus rhythm.  EKG changes: No significant ST segment changes.  Arrhythmia: None  Heart Rhythm: Normal sinus  HR Response: Appropriate heart rate response.  BP Response: Appropriate blood pressure response.  Symptoms: Nausea  ------------------------------------------------------------------------  Echocardiographic Study:  (A) Baseline echocardiogram reveals:  1. Normal mitral valve. Minimal mitral regurgitation.  2. Endocardium not well visualized; grossly normal left  ventricular systolic function.  Endocardial visualization  enhanced with intravenous injection of echo contrast  (Definity).  3. The right ventricle is not well visualized; grossly  normal right ventricular systolic function.  (B) Stress echocardiogram reveals:  Normal augmentation in left ventricular systolic function.  ------------------------------------------------------------------------  Conclusions:  1. Normal hemodynamic response.  2. Normal electrocardiographic response.  3. Normal augmentation in left ventricular systolic  function.  4. Appropriate heart rate response.  5. Appropriate blood pressure response.  6. Normal pharmacologic stress echocardiogram with no  evidence of inducible ischemia.  ------------------------------------------------------------------------  Confirmed on  2017 - 17:35:03 by Jason Wagner M.D.  ------------------------------------------------------------------------    < end of copied text >    Physical Examination:  Daily     Daily Weight in k (2018 10:23)  Vital Signs Last 24 Hrs  T(C): 36.5 (2018 14:18), Max: 37.1 (2018 19:29)  T(F): 97.7 (2018 14:18), Max: 98.7 (2018 19:29)  HR: 85 (2018 14:18) (81 - 97)  BP: 112/71 (2018 14:18) (109/67 - 146/93)  BP(mean): --  RR: 18 (2018 14:18) (18 - 20)  SpO2: 98% (2018 14:18) (93% - 99%)  POCT blood glucose test 97mg/dl at 1135am    Constitutional: NAD  HEENT: DANIA, EOMI,    Neck:  No JVD  Respiratory: CTAB/L  Cardiovascular: S1 and S2  Gastrointestinal: BS+, soft, NT/ND, Obese  Extremities: No peripheral edema  Neurological: A/O x 3  : No Goldman  Skin: No rashes    Comments:    ASA Class: I [ ]  II [ ]  III [ ]  IV [X]    The patient is a suitable candidate for the planned procedure unless box checked [ ]  No, explain:

## 2018-01-17 NOTE — PROGRESS NOTE ADULT - ASSESSMENT
ASSESSMENT: 47 year old female s/p lap sleeve gastrectomy, now with retained food bolus is stomach    PLAN:  - Admit to Bariatric Surgery  - GI consult for possible scope  - Strict NPO / IVF  - Head of bed raised to 45 degrees  - GYN f/u

## 2018-01-17 NOTE — CONSULT NOTE ADULT - ASSESSMENT
47 year old morbidly obese female (BMI 62) with HTN, HLD, controlled DM2 (complicated by peripheral neuropathy), h/o PE/DVT (in 2013, on coumadin), YVONNE on CPAP, anemia, hypothyroidism, GERD, s/p laparoscopic vertical sleeve gastrectomy (at [facility] on 11/1/17, lost 70lbs so far) presented to the emergency room with chief complain of nausea, vomiting and inability to tolerate po for 3 days.     Impression:   - Nausea, vomiting with abnormal CT findings: distention of fundus/body and suspicion for gastric stricture, could be 2/2 torsion vs stenosis 2/2 stricture   - History of sleeve gastrectomy     Plan:   - trend CBC, CMP and INR  - please keep patient NPO for EGD (with general anesthesia) today to clear fluid and debris from the stomach  - will discuss case with advanced endoscopy team re: possible balloon dilation of the stricture  - appreciate bariatric surgery recommendations   - supportive care as per primary team
47 year old morbidly obese female (BMI 62) with hx of HTN, HLD, controlled DM2 (complicated by peripheral neuropathy), h/o PE/DVT (in 2013, on coumadin), YVONNE on CPAP, anemia, hypothyroidism, vertigo, depression/anxiety, GERD, s/p laparoscopic vertical sleeve gastrectomy (at [facility] on 11/1/17, lost 70lbs so far) presents with  nausea, vomiting and inability to tolerate po for 3 days.

## 2018-01-18 LAB
ANION GAP SERPL CALC-SCNC: 10 MMOL/L — SIGNIFICANT CHANGE UP (ref 5–17)
APTT BLD: 28.5 SEC — SIGNIFICANT CHANGE UP (ref 27.5–37.4)
BUN SERPL-MCNC: 6 MG/DL — LOW (ref 7–23)
CALCIUM SERPL-MCNC: 8.2 MG/DL — LOW (ref 8.4–10.5)
CHLORIDE SERPL-SCNC: 104 MMOL/L — SIGNIFICANT CHANGE UP (ref 96–108)
CO2 SERPL-SCNC: 25 MMOL/L — SIGNIFICANT CHANGE UP (ref 22–31)
CREAT SERPL-MCNC: 0.52 MG/DL — SIGNIFICANT CHANGE UP (ref 0.5–1.3)
FOLATE SERPL-MCNC: >20 NG/ML — SIGNIFICANT CHANGE UP (ref 4.8–24.2)
GLUCOSE BLDC GLUCOMTR-MCNC: 106 MG/DL — HIGH (ref 70–99)
GLUCOSE BLDC GLUCOMTR-MCNC: 114 MG/DL — HIGH (ref 70–99)
GLUCOSE BLDC GLUCOMTR-MCNC: 116 MG/DL — HIGH (ref 70–99)
GLUCOSE SERPL-MCNC: 103 MG/DL — HIGH (ref 70–99)
HCT VFR BLD CALC: 23.4 % — LOW (ref 34.5–45)
HGB BLD-MCNC: 7.3 G/DL — LOW (ref 11.5–15.5)
INR BLD: 1.22 RATIO — HIGH (ref 0.88–1.16)
IRON SATN MFR SERPL: 3 % — LOW (ref 14–50)
IRON SATN MFR SERPL: 8 UG/DL — LOW (ref 30–160)
MAGNESIUM SERPL-MCNC: 2.3 MG/DL — SIGNIFICANT CHANGE UP (ref 1.6–2.6)
MCHC RBC-ENTMCNC: 24.7 PG — LOW (ref 27–34)
MCHC RBC-ENTMCNC: 31.1 GM/DL — LOW (ref 32–36)
MCV RBC AUTO: 79.4 FL — LOW (ref 80–100)
PHOSPHATE SERPL-MCNC: 2.9 MG/DL — SIGNIFICANT CHANGE UP (ref 2.5–4.5)
PLATELET # BLD AUTO: 347 K/UL — SIGNIFICANT CHANGE UP (ref 150–400)
POTASSIUM SERPL-MCNC: 4.1 MMOL/L — SIGNIFICANT CHANGE UP (ref 3.5–5.3)
POTASSIUM SERPL-SCNC: 4.1 MMOL/L — SIGNIFICANT CHANGE UP (ref 3.5–5.3)
PROTHROM AB SERPL-ACNC: 13.4 SEC — HIGH (ref 9.8–12.7)
RBC # BLD: 2.94 M/UL — LOW (ref 3.8–5.2)
RBC # FLD: 15.6 % — HIGH (ref 10.3–14.5)
SODIUM SERPL-SCNC: 139 MMOL/L — SIGNIFICANT CHANGE UP (ref 135–145)
TIBC SERPL-MCNC: 272 UG/DL — SIGNIFICANT CHANGE UP (ref 220–430)
TSH SERPL-MCNC: 1.28 UIU/ML — SIGNIFICANT CHANGE UP (ref 0.27–4.2)
UIBC SERPL-MCNC: 264 UG/DL — SIGNIFICANT CHANGE UP (ref 110–370)
VIT B12 SERPL-MCNC: >2000 PG/ML — HIGH (ref 232–1245)
WBC # BLD: 7.1 K/UL — SIGNIFICANT CHANGE UP (ref 3.8–10.5)
WBC # FLD AUTO: 7.1 K/UL — SIGNIFICANT CHANGE UP (ref 3.8–10.5)

## 2018-01-18 PROCEDURE — 99232 SBSQ HOSP IP/OBS MODERATE 35: CPT | Mod: GC

## 2018-01-18 RX ADMIN — HEPARIN SODIUM 5000 UNIT(S): 5000 INJECTION INTRAVENOUS; SUBCUTANEOUS at 13:28

## 2018-01-18 RX ADMIN — HYDROMORPHONE HYDROCHLORIDE 0.5 MILLIGRAM(S): 2 INJECTION INTRAMUSCULAR; INTRAVENOUS; SUBCUTANEOUS at 22:12

## 2018-01-18 RX ADMIN — HYDROMORPHONE HYDROCHLORIDE 0.5 MILLIGRAM(S): 2 INJECTION INTRAMUSCULAR; INTRAVENOUS; SUBCUTANEOUS at 10:38

## 2018-01-18 RX ADMIN — Medication 69 MICROGRAM(S): at 21:51

## 2018-01-18 RX ADMIN — HEPARIN SODIUM 5000 UNIT(S): 5000 INJECTION INTRAVENOUS; SUBCUTANEOUS at 21:51

## 2018-01-18 RX ADMIN — HEPARIN SODIUM 5000 UNIT(S): 5000 INJECTION INTRAVENOUS; SUBCUTANEOUS at 05:04

## 2018-01-18 RX ADMIN — HYDROMORPHONE HYDROCHLORIDE 0.5 MILLIGRAM(S): 2 INJECTION INTRAMUSCULAR; INTRAVENOUS; SUBCUTANEOUS at 05:04

## 2018-01-18 RX ADMIN — HYDROMORPHONE HYDROCHLORIDE 0.5 MILLIGRAM(S): 2 INJECTION INTRAMUSCULAR; INTRAVENOUS; SUBCUTANEOUS at 05:24

## 2018-01-18 RX ADMIN — HYDROMORPHONE HYDROCHLORIDE 0.5 MILLIGRAM(S): 2 INJECTION INTRAMUSCULAR; INTRAVENOUS; SUBCUTANEOUS at 10:08

## 2018-01-18 RX ADMIN — HYDROMORPHONE HYDROCHLORIDE 0.5 MILLIGRAM(S): 2 INJECTION INTRAMUSCULAR; INTRAVENOUS; SUBCUTANEOUS at 21:52

## 2018-01-18 RX ADMIN — PANTOPRAZOLE SODIUM 40 MILLIGRAM(S): 20 TABLET, DELAYED RELEASE ORAL at 11:04

## 2018-01-18 RX ADMIN — HYDROMORPHONE HYDROCHLORIDE 0.5 MILLIGRAM(S): 2 INJECTION INTRAMUSCULAR; INTRAVENOUS; SUBCUTANEOUS at 17:07

## 2018-01-18 RX ADMIN — HYDROMORPHONE HYDROCHLORIDE 0.5 MILLIGRAM(S): 2 INJECTION INTRAMUSCULAR; INTRAVENOUS; SUBCUTANEOUS at 16:37

## 2018-01-18 NOTE — DIETITIAN INITIAL EVALUATION ADULT. - OTHER INFO
Pt seen for consult. Pt s/p laparoscopic sleeve gastrectomy (11/2017). Pt reported N/V and decreased appetite and intake since 1/15; reports resolved 2/2 pt NPO status x3 days. Pt reports steady wt loss s/p sleeve gastrectomy. Stated current wt is 313 pounds on 1/15; consistent with documented ry=691.4 pounds (1/16). ?Current fk=762.6 pounds (1/17); recommend reweigh. As per previous RD eval, pre-surgical lo=126.3 pounds. Pt denies N/V constipation, diarrhea; Last BM x1 1/17. Denies chewing/swallowing difficulties PTA. NKFA. Pt aware of Coumadin and Vitamin K interactions; Education not appropriate at this time as pt is NPO.

## 2018-01-18 NOTE — PROGRESS NOTE ADULT - SUBJECTIVE AND OBJECTIVE BOX
Bariatric Surgery Progress Note      24 hr events/Subjective:     No acute issues overnight  Pain controlled with medications  Nausea controlled with anti-emetics   Voiding      Vital Signs Last 24 Hrs  T(C): 36.5 (17 Jan 2018 05:17), Max: 37.1 (16 Jan 2018 19:29)  T(F): 97.7 (17 Jan 2018 05:17), Max: 98.7 (16 Jan 2018 19:29)  HR: 87 (17 Jan 2018 05:17) (87 - 100)  BP: 109/67 (17 Jan 2018 05:17) (109/67 - 146/93)  BP(mean): --  RR: 18 (17 Jan 2018 05:17) (18 - 22)  SpO2: 96% (17 Jan 2018 05:17) (96% - 100%)    Weight (kg): 142.9 (01-16 @ 13:41)  I&O's Summary    16 Jan 2018 07:01  -  17 Jan 2018 05:48  --------------------------------------------------------  IN: 0 mL / OUT: 75 mL / NET: -75 mL      I&O's Detail    16 Jan 2018 07:01  -  17 Jan 2018 05:48  --------------------------------------------------------  IN:  Total IN: 0 mL    OUT:    Voided: 75 mL  Total OUT: 75 mL    Total NET: -75 mL          Physical Exam:    General:  Appears stated age, well-groomed, well-nourished, no distress  Chest:  clear breath sounds  Cardiovascular:  Regular rate & rhythm  Abdomen:  Soft, NT/ND, no rebound or guarding  Skin:  No rash  Neuro/Psych:  Alert, oriented to time, place and person                           8.4    9.5   )-----------( 359      ( 16 Jan 2018 14:49 )             27.9       01-16    141  |  100  |  8   ----------------------------<  85  3.8   |  26  |  0.62    Ca    8.9      16 Jan 2018 14:49    TPro  7.9  /  Alb  3.9  /  TBili  0.4  /  DBili  x   /  AST  27  /  ALT  26  /  AlkPhos  74  01-16      acetaminophen  IVPB. milliGRAM(s) IV Intermittent once PRN  dextrose 5% + sodium chloride 0.45% with potassium chloride 20 mEq/L milliLiter(s) IV Continuous <Continuous>  heparin  Injectable Unit(s) SubCutaneous every 8 hours  insulin lispro (HumaLOG) corrective regimen sliding scale  SubCutaneous three times a day before meals  levothyroxine Injectable MICROGram(s) IV Push at bedtime  pantoprazole  Injectable milliGRAM(s) IV Push daily    /*--- EGD 1/17/18    Findings:       The examined esophagus was normal.       A large amount of food (residue) was found in the gastric fundus. Despite using multiple        attempts, only part of the residual food was removed given the large amount and soft nature        of the food. There was no evidence of any stenosis or stricture in the stomach. The endoscope        easily traversed the entire upper GI tract without any difficulty.       The examined duodenum was normal.                                                                                                        Impression:          - No specimens collected.  Recommendation:      - [facility].                       - NPO for now.                       - Would give Erythromycin 250mg IV q8 hours to improve motility to clear the stomach. We can consider repeat endoscopy once the residual food/bezoar has passed.  EGD 1/17/18---*/      Assessment and Plan:  47y y/o Female p/w nausea and possible gastric outlet obstruction hemodynamically stable    - Continue NPO  - Start Erythromycin 250mg IV q8 hours  - Continue chemical and mechanical DVT prophylaxis  - Continue ambulation   - Encourage Incentive Spirometer use  - Discharge home once tolerating adequate PO and 8 point discharge criteria met    Discuss with attending Bariatric Surgery Progress Note      24 hr events/Subjective:     No acute issues overnight  Pain controlled with medications  Nausea controlled with anti-emetics   Voiding      Vital Signs Last 24 Hrs  T(C): 36.5 (17 Jan 2018 05:17), Max: 37.1 (16 Jan 2018 19:29)  T(F): 97.7 (17 Jan 2018 05:17), Max: 98.7 (16 Jan 2018 19:29)  HR: 87 (17 Jan 2018 05:17) (87 - 100)  BP: 109/67 (17 Jan 2018 05:17) (109/67 - 146/93)  BP(mean): --  RR: 18 (17 Jan 2018 05:17) (18 - 22)  SpO2: 96% (17 Jan 2018 05:17) (96% - 100%)    Weight (kg): 142.9 (01-16 @ 13:41)  I&O's Summary    16 Jan 2018 07:01  -  17 Jan 2018 05:48  --------------------------------------------------------  IN: 0 mL / OUT: 75 mL / NET: -75 mL      I&O's Detail    16 Jan 2018 07:01  -  17 Jan 2018 05:48  --------------------------------------------------------  IN:  Total IN: 0 mL    OUT:    Voided: 75 mL  Total OUT: 75 mL    Total NET: -75 mL          Physical Exam:    General:  Appears stated age, well-groomed, well-nourished, no distress  Chest:  clear breath sounds  Cardiovascular:  Regular rate & rhythm  Abdomen:  Soft, NT/ND, no rebound or guarding  Skin:  No rash  Neuro/Psych:  Alert, oriented to time, place and person                           8.4    9.5   )-----------( 359      ( 16 Jan 2018 14:49 )             27.9       01-16    141  |  100  |  8   ----------------------------<  85  3.8   |  26  |  0.62    Ca    8.9      16 Jan 2018 14:49    TPro  7.9  /  Alb  3.9  /  TBili  0.4  /  DBili  x   /  AST  27  /  ALT  26  /  AlkPhos  74  01-16      acetaminophen  IVPB. milliGRAM(s) IV Intermittent once PRN  dextrose 5% + sodium chloride 0.45% with potassium chloride 20 mEq/L milliLiter(s) IV Continuous <Continuous>  heparin  Injectable Unit(s) SubCutaneous every 8 hours  insulin lispro (HumaLOG) corrective regimen sliding scale  SubCutaneous three times a day before meals  levothyroxine Injectable MICROGram(s) IV Push at bedtime  pantoprazole  Injectable milliGRAM(s) IV Push daily    /*--- EGD 1/17/18    Findings:       The examined esophagus was normal.       A large amount of food (residue) was found in the gastric fundus. Despite using multiple        attempts, only part of the residual food was removed given the large amount and soft nature        of the food. There was no evidence of any stenosis or stricture in the stomach. The endoscope        easily traversed the entire upper GI tract without any difficulty.       The examined duodenum was normal.                                                                                                        Impression:          - No specimens collected.  Recommendation:      - [facility].                       - NPO for now.                       - Would give Erythromycin 250mg IV q8 hours to improve motility to clear the stomach. We can consider repeat endoscopy once the residual food/bezoar has passed.  EGD 1/17/18---*/      Assessment and Plan:  47y y/o Female p/w nausea and possible gastric outlet obstruction hemodynamically stable    - Continue NPO  - Start Erythromycin 250mg IV q8 hours  - Continue chemical and mechanical DVT prophylaxis  - Continue ambulation   - Encourage Incentive Spirometer use  - f/u GI plans for repeat EGD as not all of bezoar was removed    Discuss with attending

## 2018-01-18 NOTE — PROGRESS NOTE ADULT - ATTENDING COMMENTS
I saw and examined the patient, and reviewed  the history and data with the patient and staff  Agree with note which was also reviewed and edited where appropriate.  D/W patient, RN, residents and Fellow    Needs f/u EGD on Friday.  Stent v balloon or both if narrowing or angulation noted. I saw and examined the patient, and reviewed  the history and data with the patient and staff  Agree with note which was also reviewed and edited where appropriate.  D/W patient, RN, residents and Fellow    Needs f/u EGD on Friday.

## 2018-01-18 NOTE — CHART NOTE - NSCHARTNOTEFT_GEN_A_CORE
Upon Nutritional Assessment by the Registered Dietitian your patient was determined to meet criteria / has evidence of the following diagnosis/diagnoses:          [ ]  Mild Protein Calorie Malnutrition        [ ]  Moderate Protein Calorie Malnutrition        [ ] Severe Protein Calorie Malnutrition        [ ] Unspecified Protein Calorie Malnutrition        [ ] Underweight / BMI <19        [X] Morbid Obesity / BMI > 40      Findings as based on:  [X] Comprehensive nutrition assessment   [ ] Nutrition Focused Physical Exam  [X] Other:  BMI=53.9     Nutrition Plan/Recommendations:    1) pt s/p gastric sleeve; will provide education as appropriate  2) as medically appropriate, consider adding multivitamin with iron, calcium citrate with vitamin D3, and Vitamin B12      PROVIDER Section:     By signing this assessment you are acknowledging and agree with the diagnosis/diagnoses assigned by the Registered Dietitian    Comments:

## 2018-01-18 NOTE — DIETITIAN INITIAL EVALUATION ADULT. - FACTORS AFF FOOD INTAKE
other (specify)/persistent nausea/vomiting/persistent constipation persistent constipation/Pt with c/o abdominal pain, nausea, vomiting for 3 days; no complaints today/other (specify)/persistent nausea/vomiting

## 2018-01-18 NOTE — PROGRESS NOTE ADULT - ASSESSMENT
47 year old morbidly obese female (BMI 62) with HTN, HLD, controlled DM2 (complicated by peripheral neuropathy), h/o PE/DVT (in 2013, on coumadin), YVONNE on CPAP, anemia, hypothyroidism, GERD, s/p laparoscopic vertical sleeve gastrectomy (at [facility] on 11/1/17, lost 70lbs so far) presented to the emergency room with chief complain of nausea, vomiting and inability to tolerate po for 3 days.     Impression:   - Nausea, vomiting with abnormal CT findings: distention of fundus/body and suspicion for gastric stricture, s/p EGD 1/17 with aspiration of gastric contents  - History of sleeve gastrectomy     Plan:   - trend CBC, CMP and INR  - recommend Erythromycin 250mg IV q8 hours to improve motility to clear the stomach.   - We can consider repeat endoscopy once the residual food/bezoar has passed  - appreciate bariatric surgery recommendations   - supportive care as per primary team 47 year old morbidly obese female (BMI 62) with HTN, HLD, controlled DM2 (complicated by peripheral neuropathy), h/o PE/DVT (in 2013, on coumadin), YVONNE on CPAP, anemia, hypothyroidism, GERD, s/p laparoscopic vertical sleeve gastrectomy (at [facility] on 11/1/17, lost 70lbs so far) presented to the emergency room with chief complain of nausea, vomiting and inability to tolerate po for 3 days.     Impression:   - Nausea, vomiting with abnormal CT findings: distention of fundus/body and suspicion for gastric stricture, s/p EGD 1/17 with aspiration of gastric contents  - History of sleeve gastrectomy     Plan:   - trend CBC, CMP and INR  - recommend Erythromycin 250mg IV q8 hours to improve motility to clear the stomach.   - plan for repeat endoscopy on Friday, please keep patient NPO after midnight   - appreciate bariatric surgery recommendations   - supportive care as per primary team     GI team will continue to follow.   Thank you for the consult.   #[phone]

## 2018-01-18 NOTE — DIETITIAN INITIAL EVALUATION ADULT. - ADHERENCE
fair Pt adheres to full liquid diet at home; reports consuming some foods that do not follow the full liquid diet restrictions. Pt reports taking fingersticks 3x/day, and is taking Victoza. A1c=4.4/fair fair/Pt adheres to full liquid diet at home; reports consuming some foods that do not follow full liquid diet restrictions; Pt reports that she is not following up with an outpatient dietitian. Pt reports taking fingersticks 3x/day, and is taking Victoza. A1c=4.4

## 2018-01-18 NOTE — DIETITIAN INITIAL EVALUATION ADULT. - ORAL INTAKE PTA
good good/Pt reports good appetite and intake prior to 1/15 when vomiting began; Typical diet includes 4 meals/day: protein in yogurt for breakfast, tuna for lunch, cottage cheese or apple sauce for next meal, and yogurt with protein mix for dinner; sometimes has soups with vegetables and barley; reinforced importance of strained soups for full liquid diet. Pt has HHA at home. Pt takes multivitamin PTA; states it "contains all of the vitamins she needs" (iron, b12, calcium citrate, D3).

## 2018-01-18 NOTE — DIETITIAN INITIAL EVALUATION ADULT. - NS AS NUTRI INTERV MEALS SNACK
1) Advance diet as medically appropriate; RD to monitor diet advancement for appropriate nutrition education Vitamin - modified diet/1) Advance diet as medically appropriate; RD to monitor diet advancement for appropriate nutrition education 2) As medically feasible, recommend multivitamin with iron, calcium citrate with vitamin D, and Vitamin B12./Mineral - modified diet

## 2018-01-18 NOTE — DIETITIAN INITIAL EVALUATION ADULT. - PERTINENT LABORATORY DATA
01-18 Na139 mmol/L Glu 103 mg/dL<H> K+ 4.1 mmol/L Cr  0.52 mg/dL BUN 6 mg/dL<L> Phos 2.9 mg/dL, fingersticks 1/18 114, 1/17  mg/dL. 1/17 A1c=4.4; 1/17 calcium 8.2.

## 2018-01-18 NOTE — PROGRESS NOTE ADULT - SUBJECTIVE AND OBJECTIVE BOX
Chief Complaint:  Patient is a 47y old  Female who presents with a chief complaint of inability to tolerate PO (2018 21:43)      Interval Events:     Allergies:  No Known Allergies      Hospital Medications:  dextrose 5% + sodium chloride 0.45% with potassium chloride 20 mEq/L 1000 milliLiter(s) IV Continuous <Continuous>  dextrose 50% Injectable 25 Gram(s) IV Push once  heparin  Injectable 5000 Unit(s) SubCutaneous every 8 hours  HYDROmorphone  Injectable 0.5 milliGRAM(s) IV Push every 3 hours PRN  insulin lispro (HumaLOG) corrective regimen sliding scale   SubCutaneous every 6 hours  levothyroxine Injectable 69 MICROGram(s) IV Push at bedtime  pantoprazole  Injectable 40 milliGRAM(s) IV Push daily      PMHX/PSHX:  History of DVT (deep vein thrombosis)  History of pulmonary embolism  Diabetic peripheral neuropathy  Iron deficiency anemia  UTI (urinary tract infection)  Morbid obesity with BMI of 60.0-69.9, adult  Menorrhagia  Osteoarthritis  Neuropathy  DVT (deep venous thrombosis)  Hyperlipidemia  YVONNE on CPAP  Type 2 diabetes mellitus  Pulmonary embolism  Hypertension  GERD (gastroesophageal reflux disease)  Vertigo  Diabetes  Hypothyroidism  Morbid obesity  Anemia  History of oophorectomy, unilateral  S/P D&C (status post dilation and curettage)  S/P ovarian cystectomy  No significant past surgical history      Family history:  No pertinent family history in first degree relatives  No significant family history      ROS:     General:  No wt loss, fevers, chills, night sweats, fatigue,   Eyes:  Good vision, no reported pain  ENT:  No sore throat, pain, runny nose, dysphagia  CV:  No pain, palpitations, hypo/hypertension  Resp:  No dyspnea, cough, tachypnea, wheezing  GI:  See HPI  :  No pain, bleeding, incontinence, nocturia  Muscle:  No pain, weakness  Neuro:  No weakness, tingling, memory problems  Psych:  No fatigue, insomnia, mood problems, depression  Endocrine:  No polyuria, polydipsia, cold/heat intolerance  Heme:  No petechiae, ecchymosis, easy bruisability  Skin:  No rash, edema      PHYSICAL EXAM:     GENERAL:  Appears stated age, well-groomed, well-nourished, no distress  HEENT:  NC/AT,  conjunctivae clear, sclera -anicteric  CHEST:  Full & symmetric excursion, no increased effort, breath sounds clear  HEART:  Regular rhythm, S1, S2, no murmur/rub/S3/S4,  no edema  ABDOMEN:  Soft, non-tender, non-distended, normoactive bowel sounds,  no masses ,no hepato-splenomegaly,   EXTREMITIES:  no cyanosis,clubbing or edema  SKIN:  No rash/erythema/ecchymoses/petechiae/wounds/abscess/warm/dry  NEURO:  Alert, oriented    Vital Signs:  Vital Signs Last 24 Hrs  T(C): 36.5 (2018 05:44), Max: 36.7 (2018 10:23)  T(F): 97.7 (2018 05:44), Max: 98.1 (2018 10:23)  HR: 75 (2018 05:44) (75 - 85)  BP: 105/63 (2018 05:44) (105/63 - 124/67)  BP(mean): --  RR: 18 (2018 05:44) (18 - 18)  SpO2: 96% (2018 05:44) (93% - 98%)  Daily     Daily Weight in k (2018 10:23)    LABS:                        7.6    6.2   )-----------( 199      ( 2018 12:51 )             23.8     17    141  |  103  |  9   ----------------------------<  91  3.6   |  27  |  0.66    Ca    8.2<L>      2018 09:54  Phos  3.4       Mg     2.2         TPro  7.9  /  Alb  3.9  /  TBili  0.4  /  DBili  x   /  AST  27  /  ALT  26  /  AlkPhos  74  -16    LIVER FUNCTIONS - ( 2018 14:49 )  Alb: 3.9 g/dL / Pro: 7.9 g/dL / ALK PHOS: 74 U/L / ALT: 26 U/L RC / AST: 27 U/L / GGT: x           PT/INR - ( 2018 14:49 )   PT: 20.7 sec;   INR: 1.87 ratio         PTT - ( 2018 14:49 )  PTT:34.4 sec        Imaging:      < from: Upper Endoscopy (18 @ 16:22) >                                                                                                        Impression:          - No specimens collected.  Recommendation:      - [facility].                 - NPO for now.                       - Would give Erythromycin 250mg IV q8 hours to improve motility to clear the                        stomach. We can consider repeat endoscopy once the residual food/bezoar has   passed.                                                                                                          < end of copied text > Chief Complaint:  Patient is a 47y old  Female who presents with a chief complaint of inability to tolerate PO (2018 21:43)      Interval Events:   Patient seen and examined. She had an EGD done yesterday and tolerated the procedure well.     Allergies:  No Known Allergies      Hospital Medications:  dextrose 5% + sodium chloride 0.45% with potassium chloride 20 mEq/L 1000 milliLiter(s) IV Continuous <Continuous>  dextrose 50% Injectable 25 Gram(s) IV Push once  heparin  Injectable 5000 Unit(s) SubCutaneous every 8 hours  HYDROmorphone  Injectable 0.5 milliGRAM(s) IV Push every 3 hours PRN  insulin lispro (HumaLOG) corrective regimen sliding scale   SubCutaneous every 6 hours  levothyroxine Injectable 69 MICROGram(s) IV Push at bedtime  pantoprazole  Injectable 40 milliGRAM(s) IV Push daily      PMHX/PSHX:  History of DVT (deep vein thrombosis)  History of pulmonary embolism  Diabetic peripheral neuropathy  Iron deficiency anemia  UTI (urinary tract infection)  Morbid obesity with BMI of 60.0-69.9, adult  Menorrhagia  Osteoarthritis  Neuropathy  DVT (deep venous thrombosis)  Hyperlipidemia  YVONNE on CPAP  Type 2 diabetes mellitus  Pulmonary embolism  Hypertension  GERD (gastroesophageal reflux disease)  Vertigo  Diabetes  Hypothyroidism  Morbid obesity  Anemia  History of oophorectomy, unilateral  S/P D&C (status post dilation and curettage)  S/P ovarian cystectomy  No significant past surgical history      Family history:  No pertinent family history in first degree relatives  No significant family history      ROS:     General:  No wt loss, fevers, chills, night sweats, fatigue,   Eyes:  Good vision, no reported pain  ENT:  No sore throat, pain, runny nose, dysphagia  CV:  No pain, palpitations, hypo/hypertension  Resp:  No dyspnea, cough, tachypnea, wheezing  GI:  See HPI  :  No pain, bleeding, incontinence, nocturia  Muscle:  No pain, weakness  Neuro:  No weakness, tingling, memory problems  Psych:  No fatigue, insomnia, mood problems, depression  Endocrine:  No polyuria, polydipsia, cold/heat intolerance  Heme:  No petechiae, ecchymosis, easy bruisability  Skin:  No rash, edema      PHYSICAL EXAM:     GENERAL:  Appears stated age, well-groomed, well-nourished, no distress  HEENT:  NC/AT,  conjunctivae clear, sclera -anicteric  CHEST:  Full & symmetric excursion, no increased effort, breath sounds clear  HEART:  Regular rhythm, S1, S2, no murmur/rub/S3/S4,  no edema  ABDOMEN:  Soft, non-tender, non-distended, normoactive bowel sounds,  no masses ,no hepato-splenomegaly,   EXTREMITIES:  no cyanosis,clubbing or edema  SKIN:  No rash/erythema/ecchymoses/petechiae/wounds/abscess/warm/dry  NEURO:  Alert, oriented    Vital Signs:  Vital Signs Last 24 Hrs  T(C): 36.5 (2018 05:44), Max: 36.7 (2018 10:23)  T(F): 97.7 (2018 05:44), Max: 98.1 (2018 10:23)  HR: 75 (2018 05:44) (75 - 85)  BP: 105/63 (2018 05:44) (105/63 - 124/67)  BP(mean): --  RR: 18 (2018 05:44) (18 - 18)  SpO2: 96% (2018 05:44) (93% - 98%)  Daily     Daily Weight in k (2018 10:23)    LABS:                        7.6    6.2   )-----------( 199      ( 2018 12:51 )             23.8         141  |  103  |  9   ----------------------------<  91  3.6   |  27  |  0.66    Ca    8.2<L>      2018 09:54  Phos  3.4       Mg     2.2         TPro  7.9  /  Alb  3.9  /  TBili  0.4  /  DBili  x   /  AST  27  /  ALT  26  /  AlkPhos  74  -16    LIVER FUNCTIONS - ( 2018 14:49 )  Alb: 3.9 g/dL / Pro: 7.9 g/dL / ALK PHOS: 74 U/L / ALT: 26 U/L RC / AST: 27 U/L / GGT: x           PT/INR - ( 2018 14:49 )   PT: 20.7 sec;   INR: 1.87 ratio         PTT - ( 2018 14:49 )  PTT:34.4 sec        Imaging:      < from: Upper Endoscopy (18 @ 16:22) >                                                                                                        Impression:          - No specimens collected.  Recommendation:      - [facility] patient to [facility].                 - NPO for now.                       - Would give Erythromycin 250mg IV q8 hours to improve motility to clear the                        stomach. We can consider repeat endoscopy once the residual food/bezoar has   passed.                                                                                                          < end of copied text >

## 2018-01-18 NOTE — DIETITIAN INITIAL EVALUATION ADULT. - ENERGY NEEDS
ht: 64 inches, wt: 314.4 1/16, ?330.6 1/17 pounds, BMI: 53.9, IBW: 120 pounds (+/- 10%), %IBW: 262%  Edema: no edema noted Skin: intact; no pressure ulcers noted  Other pertinent information: Pt is 47 year old F admitted for inability to tolerate PO intake, N/V. PMHx: HTN, HLD, controlled T2DM, diabetic peripheral neuropathy, h/o PE/DVT (2014) on coumadin, YVONNE on CPAP, anemia, hypothyroidism, GERD, s/p lap sleeve gastrectomy (11/2017); lost 70 pounds since 11/1/2017. Pt with suspicion of gastric stricture and distention of body/fundus, per EGD 1/17 with aspiration of gastric contents

## 2018-01-19 ENCOUNTER — TRANSCRIPTION ENCOUNTER (OUTPATIENT)
Age: 48
End: 2018-01-19

## 2018-01-19 LAB
ANION GAP SERPL CALC-SCNC: 8 MMOL/L — SIGNIFICANT CHANGE UP (ref 5–17)
BUN SERPL-MCNC: 5 MG/DL — LOW (ref 7–23)
CALCIUM SERPL-MCNC: 8.3 MG/DL — LOW (ref 8.4–10.5)
CHLORIDE SERPL-SCNC: 105 MMOL/L — SIGNIFICANT CHANGE UP (ref 96–108)
CO2 SERPL-SCNC: 27 MMOL/L — SIGNIFICANT CHANGE UP (ref 22–31)
CREAT SERPL-MCNC: 0.57 MG/DL — SIGNIFICANT CHANGE UP (ref 0.5–1.3)
GLUCOSE BLDC GLUCOMTR-MCNC: 102 MG/DL — HIGH (ref 70–99)
GLUCOSE BLDC GLUCOMTR-MCNC: 75 MG/DL — SIGNIFICANT CHANGE UP (ref 70–99)
GLUCOSE BLDC GLUCOMTR-MCNC: 94 MG/DL — SIGNIFICANT CHANGE UP (ref 70–99)
GLUCOSE BLDC GLUCOMTR-MCNC: 94 MG/DL — SIGNIFICANT CHANGE UP (ref 70–99)
GLUCOSE BLDC GLUCOMTR-MCNC: 99 MG/DL — SIGNIFICANT CHANGE UP (ref 70–99)
GLUCOSE SERPL-MCNC: 89 MG/DL — SIGNIFICANT CHANGE UP (ref 70–99)
HCT VFR BLD CALC: 24.8 % — LOW (ref 34.5–45)
HGB BLD-MCNC: 7.3 G/DL — LOW (ref 11.5–15.5)
MAGNESIUM SERPL-MCNC: 2.2 MG/DL — SIGNIFICANT CHANGE UP (ref 1.6–2.6)
MCHC RBC-ENTMCNC: 23.8 PG — LOW (ref 27–34)
MCHC RBC-ENTMCNC: 29.5 GM/DL — LOW (ref 32–36)
MCV RBC AUTO: 80.6 FL — SIGNIFICANT CHANGE UP (ref 80–100)
PHOSPHATE SERPL-MCNC: 2.5 MG/DL — SIGNIFICANT CHANGE UP (ref 2.5–4.5)
PLATELET # BLD AUTO: 321 K/UL — SIGNIFICANT CHANGE UP (ref 150–400)
POTASSIUM SERPL-MCNC: 4 MMOL/L — SIGNIFICANT CHANGE UP (ref 3.5–5.3)
POTASSIUM SERPL-SCNC: 4 MMOL/L — SIGNIFICANT CHANGE UP (ref 3.5–5.3)
RBC # BLD: 3.07 M/UL — LOW (ref 3.8–5.2)
RBC # FLD: 15.7 % — HIGH (ref 10.3–14.5)
SODIUM SERPL-SCNC: 140 MMOL/L — SIGNIFICANT CHANGE UP (ref 135–145)
WBC # BLD: 6.1 K/UL — SIGNIFICANT CHANGE UP (ref 3.8–10.5)
WBC # FLD AUTO: 6.1 K/UL — SIGNIFICANT CHANGE UP (ref 3.8–10.5)

## 2018-01-19 PROCEDURE — 43235 EGD DIAGNOSTIC BRUSH WASH: CPT | Mod: GC

## 2018-01-19 RX ORDER — ERYTHROMYCIN ETHYLSUCCINATE 400 MG
1 TABLET ORAL
Qty: 42 | Refills: 0 | OUTPATIENT
Start: 2018-01-19 | End: 2018-02-01

## 2018-01-19 RX ORDER — ERYTHROMYCIN ETHYLSUCCINATE 400 MG
250 TABLET ORAL THREE TIMES A DAY
Qty: 0 | Refills: 0 | Status: DISCONTINUED | OUTPATIENT
Start: 2018-01-19 | End: 2018-01-21

## 2018-01-19 RX ADMIN — Medication 69 MICROGRAM(S): at 21:13

## 2018-01-19 RX ADMIN — HYDROMORPHONE HYDROCHLORIDE 0.5 MILLIGRAM(S): 2 INJECTION INTRAMUSCULAR; INTRAVENOUS; SUBCUTANEOUS at 21:13

## 2018-01-19 RX ADMIN — Medication 62.5 MILLIMOLE(S): at 21:16

## 2018-01-19 RX ADMIN — HYDROMORPHONE HYDROCHLORIDE 0.5 MILLIGRAM(S): 2 INJECTION INTRAMUSCULAR; INTRAVENOUS; SUBCUTANEOUS at 11:37

## 2018-01-19 RX ADMIN — Medication 250 MILLIGRAM(S): at 21:59

## 2018-01-19 RX ADMIN — HYDROMORPHONE HYDROCHLORIDE 0.5 MILLIGRAM(S): 2 INJECTION INTRAMUSCULAR; INTRAVENOUS; SUBCUTANEOUS at 05:49

## 2018-01-19 RX ADMIN — HEPARIN SODIUM 5000 UNIT(S): 5000 INJECTION INTRAVENOUS; SUBCUTANEOUS at 21:14

## 2018-01-19 RX ADMIN — HYDROMORPHONE HYDROCHLORIDE 0.5 MILLIGRAM(S): 2 INJECTION INTRAMUSCULAR; INTRAVENOUS; SUBCUTANEOUS at 05:29

## 2018-01-19 RX ADMIN — HYDROMORPHONE HYDROCHLORIDE 0.5 MILLIGRAM(S): 2 INJECTION INTRAMUSCULAR; INTRAVENOUS; SUBCUTANEOUS at 11:22

## 2018-01-19 RX ADMIN — HEPARIN SODIUM 5000 UNIT(S): 5000 INJECTION INTRAVENOUS; SUBCUTANEOUS at 05:29

## 2018-01-19 RX ADMIN — HYDROMORPHONE HYDROCHLORIDE 0.5 MILLIGRAM(S): 2 INJECTION INTRAMUSCULAR; INTRAVENOUS; SUBCUTANEOUS at 21:50

## 2018-01-19 RX ADMIN — PANTOPRAZOLE SODIUM 40 MILLIGRAM(S): 20 TABLET, DELAYED RELEASE ORAL at 11:25

## 2018-01-19 NOTE — DISCHARGE NOTE ADULT - INSTRUCTIONS
Please take a High protein liquid diet for 1 month. A packet of diet recommendations has been included. Follow up with GI sersies Follow up with MD; and [facility] script given. Take Reglan for two weeks. Follow diet in packet provided.

## 2018-01-19 NOTE — PROGRESS NOTE ADULT - SUBJECTIVE AND OBJECTIVE BOX
Pre-Endoscopy Evaluation      Referring Physician:   Dr. White                          Procedure: EGD    Indication for Procedure:  Nausea/vomiting, Abnormal CT    Pertinent History: 47 year old morbidly obese female with PMH of HTN, HLD, DM2 (complicated by peripheral neuropathy),  PE/DVT (in 2013, on coumadin), YVONNE on CPAP, Anemia, Hypothyroidism, GERD, s/p Laparoscopic  Sleeve Gastrectomy (at [facility] on 11/1/17, lost 70lbs) presented to the emergency room with chief complain of nausea, vomiting and inability to tolerate po.     Sedation by Anesthesia [X]    PAST MEDICAL & SURGICAL HISTORY:  History of DVT (deep vein thrombosis): 2014  History of pulmonary embolism: 2014 x 2 -1 in march, 1 in July - on coumadin  Diabetic peripheral neuropathy: severe  Iron deficiency anemia  UTI (urinary tract infection):  Morbid obesity with BMI of 60.0-69.9, adult: BMI 62  Menorrhagia: improved  Osteoarthritis  Hyperlipidemia  YVONNE on CPAP: severe, not using CPAP  Type 2 diabetes mellitus: Hg A1C 4.9 %  ( 9/12/17), diagnosed in 2011  Hypertension  GERD (gastroesophageal reflux disease)  Vertigo: chronic, without changes. Patient was evaluated in the past, etiology unknown.  Hypothyroidism  History of oophorectomy, unilateral: right 1999  S/P D&C (status post dilation and curettage)      PMH of Gastroparesis [ ]  Gastric Surgery [X]  Gastric Outlet Obstruction [ ]    Allergies;    No Known Allergies    Intolerances:    Latex allergy: [ ] yes [X] no    Medications:MEDICATIONS  (STANDING):  dextrose 5% + sodium chloride 0.45% with potassium chloride 20 mEq/L 1000 milliLiter(s) (125 mL/Hr) IV Continuous <Continuous>  heparin  Injectable 5000 Unit(s) SubCutaneous every 8 hours  insulin lispro (HumaLOG) corrective regimen sliding scale   SubCutaneous every 6 hours  levothyroxine Injectable 69 MICROGram(s) IV Push at bedtime  pantoprazole  Injectable 40 milliGRAM(s) IV Push daily    MEDICATIONS  (PRN):  HYDROmorphone  Injectable 0.5 milliGRAM(s) IV Push every 3 hours PRN Moderate Pain (4 - 6)      Smoking: [ ] yes  [X] no    AICD/PPM: [ ] yes   [X] no    Pertinent lab data:                        7.3    6.1   )-----------( 321      ( 19 Jan 2018 11:27 )             24.8     01-19    140  |  105  |  5<L>  ----------------------------<  89  4.0   |  27  |  0.57    Ca    8.3<L>      19 Jan 2018 11:27  Phos  2.5     01-19  Mg     2.2     01-19      PT/INR - ( 18 Jan 2018 09:55 )   PT: 13.4 sec;   INR: 1.22 ratio      PTT - ( 18 Jan 2018 09:55 )  PTT:28.5 sec    Pregnancy Profile, Urine (01.17.18 @ 11:23)    Pregnancy Profile, Urine: Negative: There is potential for a false-negative result for very early pregnancies  or with gestational age 5-7 weeks or above. The quantitative measurement  of serum hCG provides the most sensitive and accurate assessment of  pregnancy status.      Physical Examination:  Daily Height in cm: 162.56 (19 Jan 2018 10:30)    Daily   Vital Signs Last 24 Hrs  T(C): 36.2 (19 Jan 2018 10:30), Max: 36.8 (18 Jan 2018 14:07)  T(F): 97.2 (19 Jan 2018 10:30), Max: 98.3 (18 Jan 2018 14:07)  HR: 77 (19 Jan 2018 10:30) (70 - 78)  BP: 97/64 (19 Jan 2018 10:30) (97/64 - 122/70)  BP(mean): --  RR: 17 (19 Jan 2018 10:30) (16 - 18)  SpO2: 100% (19 Jan 2018 10:30) (95% - 100%)    POC blood glucose test 94mg/dl at 1203pm      Constitutional: NAD  Neck:  No JVD  Respiratory: CTAB/L  Cardiovascular: S1 and S2  Gastrointestinal: BS+, soft, NT/ND  Extremities: No peripheral edema  Neurological: A/O x 3, no focal deficits  : No Goldman  Skin: No rashes    Comments:    ASA Class: I [ ]  II [ ]  III [ ]  IV [X]    The patient is a suitable candidate for the planned procedure unless box checked [ ]  No, explain:

## 2018-01-19 NOTE — DISCHARGE NOTE ADULT - HOSPITAL COURSE
47 yr old morbidly obese female, BMI 62,  with  HTN , HLD, controlled DM2, Diabetic Peripheral Neuropathy, h/o PE/DVT ( 2014)  On coumadin, YVONNE on CPAP, Anemia, hypothyroidism, GERD, s/p laparoscopic vertical sleeve gastrectomy on 11/1/17 initially presented to [facility] on 1/16. She had been recovering well after surgery until the morning prior to admission when she noted that she was unable to take PO and has had repeated episodes of nausea, emesis and reflux. She last had a bowel movement the week prior to presentation and stated that she was no longer passing flatus. Labs demonstrated normal WBC count and normal electrolytes. CTAP was obtained and demonstrated Moderate distention of the gastric fundus/body with debris and contrast. The pt was admitted to the hospital, made NPO and started on IV fluids, and gastroenterology was consulted. Upper GI was obtained which showed retained gastric contents in the stomach concerning for bezoar. On 1/17 EGD was performed and demonstrated that esophagus was normal. A large amount of food (residue) was found in the gastric fundus. Despite using multiple attempts, only part of the residual food was removed given the large amount and soft nature of the food. There was no evidence of any stenosis or stricture in the stomach. The endoscope easily traversed the entire upper GI tract without any difficulty. The examined duodenum was normal. The pt tolerated the procedure well and was transferred back to the floor. The pt was kept NPO and given adequate analgesia and nausea control. Her electrolytes remained normal. On 1/19 the pt went for repeat EGD which demonstrated a large amount of food (residue) was found in the gastric fundus. Removal of food was attempted using a tripod. It was unsuccessful due to the large size of the food bolus. The pt was started on erythromycin to enhance gastric motility. The pt was PO trialed on a high protein liquid diet which she tolerated. The pt was instructed to continue a high protein liquid diet for 1 month after discharge and to obtain a follow up repeat upper GI study in 1 month after discharge. At the time of discharge, the patient was hemodynamically stable, was tolerating PO diet, was voiding urine and passing stool, was ambulating, and was comfortable with adequate pain control. The patient was instructed to follow up with Dr. White 7-10 days after discharge discharge from the hospital with repeat upper GI series in 1 month and to continue high protein liquid diet for a month. The pt was given a script for the repeat upper GI study and a list of diet recommendations for a high protein liquid diet.. The patient/family felt comfortable with discharge. The patient was discharged to home. The patient had no other issues. 47 yr old morbidly obese female, BMI 62,  with  HTN , HLD, controlled DM2, Diabetic Peripheral Neuropathy, h/o PE/DVT ( 2014)  On coumadin, YVONNE on CPAP, Anemia, hypothyroidism, GERD, s/p laparoscopic vertical sleeve gastrectomy on 11/1/17 initially presented to [facility] on 1/16. She had been recovering well after surgery until the morning prior to admission when she noted that she was unable to take PO and has had repeated episodes of nausea, emesis and reflux. She last had a bowel movement the week prior to presentation and stated that she was no longer passing flatus. Labs demonstrated normal WBC count and normal electrolytes. CTAP was obtained and demonstrated Moderate distention of the gastric fundus/body with debris and contrast. The pt was admitted to the hospital, made NPO and started on IV fluids, and gastroenterology was consulted. Upper GI was obtained which showed retained gastric contents in the stomach concerning for bezoar. On 1/17 EGD was performed and demonstrated that esophagus was normal. A large amount of food (residue) was found in the gastric fundus. Despite using multiple attempts, only part of the residual food was removed given the large amount and soft nature of the food. There was no evidence of any stenosis or stricture in the stomach. The endoscope easily traversed the entire upper GI tract without any difficulty. The examined duodenum was normal. The pt tolerated the procedure well and was transferred back to the floor. The pt was kept NPO and given adequate analgesia and nausea control. Her electrolytes remained normal. On 1/19 the pt went for repeat EGD which demonstrated a large amount of food (residue) was found in the gastric fundus. Removal of food was attempted using a tripod. It was unsuccessful due to the large size of the food bolus. The pt was started on erythromycin to enhance gastric motility. The pt was PO trialed on a high protein liquid diet which she tolerated. The pt was instructed to continue a high protein liquid diet for 1 month after discharge and to obtain a follow up repeat upper GI study in 1 month after discharge. Pt evaluated the pt and said she needs home PT. At the time of discharge, the patient was hemodynamically stable, was tolerating PO diet, was voiding urine and passing stool, was ambulating, and was comfortable with adequate pain control. The patient was instructed to follow up with Dr. White 7-10 days after discharge discharge from the hospital with repeat upper GI series in 1 month and to continue high protein liquid diet for a month. The pt was given a script for the repeat upper GI study and a list of diet recommendations for a high protein liquid diet. The patient felt comfortable with discharge. The patient was discharged to home with home PT, and home aid. The patient had no other issues. 47 yr old morbidly obese female, BMI 62,  with  HTN , HLD, controlled DM2, Diabetic Peripheral Neuropathy, h/o PE/DVT ( 2014)  On coumadin, YVONNE on CPAP, Anemia, hypothyroidism, GERD, s/p laparoscopic vertical sleeve gastrectomy on 11/1/17 initially presented to [facility] on 1/16. She had been recovering well after surgery until the morning prior to admission when she noted that she was unable to take PO and has had repeated episodes of nausea, emesis and reflux. She last had a bowel movement the week prior to presentation and stated that she was no longer passing flatus. Labs demonstrated normal WBC count and normal electrolytes. CTAP was obtained and demonstrated Moderate distention of the gastric fundus/body with debris and contrast. The pt was admitted to the hospital, made NPO and started on IV fluids, and gastroenterology was consulted. Upper GI was obtained which showed retained gastric contents in the stomach concerning for bezoar. On 1/17 EGD was performed and demonstrated that esophagus was normal. A large amount of food (residue) was found in the gastric fundus. Despite using multiple attempts, only part of the residual food was removed given the large amount and soft nature of the food. There was no evidence of any stenosis or stricture in the stomach. The endoscope easily traversed the entire upper GI tract without any difficulty. The examined duodenum was normal. The pt tolerated the procedure well and was transferred back to the floor. The pt was kept NPO and given adequate analgesia and nausea control. Her electrolytes remained normal. On 1/19 the pt went for repeat EGD which demonstrated a large amount of food (residue) was found in the gastric fundus. Removal of food was attempted using a tripod. It was unsuccessful due to the large size of the food bolus. The pt was started on erythromycin to enhance gastric motility. The pt was PO trialed on a high protein liquid diet which she tolerated. The pt was instructed to continue a high protein liquid diet for 1 month after discharge and to obtain a follow up repeat upper GI study in 1 month after discharge. Pt evaluated the pt and said she needs home PT. At the time of discharge, the patient was hemodynamically stable, was tolerating PO diet, was voiding urine and passing stool, was ambulating, and was comfortable with adequate pain control. The patient was instructed to follow up with Dr. White 7-10 days after discharge discharge from the hospital with repeat upper GI series in 1 month and to continue high protein liquid diet for a month. The pt was instructed to resume taking her warfarin after discharge, and follow up with her PCP or cardiologist for INR monitoring. The pt was given a script for the repeat upper GI study and a list of diet recommendations for a high protein liquid diet. The patient felt comfortable with discharge. The patient was discharged to home with home PT, and home aid. The patient had no other issues. 47 yr old morbidly obese female, BMI 62,  with  HTN , HLD, controlled DM2, Diabetic Peripheral Neuropathy, h/o PE/DVT ( 2014)  On coumadin, YVONNE on CPAP, Anemia, hypothyroidism, GERD, s/p laparoscopic vertical sleeve gastrectomy on 11/1/17 initially presented to [facility] on 1/16. She had been recovering well after surgery until the morning prior to admission when she noted that she was unable to take PO and has had repeated episodes of nausea, emesis and reflux. She last had a bowel movement the week prior to presentation and stated that she was no longer passing flatus. Labs demonstrated normal WBC count and normal electrolytes. CTAP was obtained and demonstrated Moderate distention of the gastric fundus/body with debris and contrast. The pt was admitted to the hospital, made NPO and started on IV fluids, and gastroenterology was consulted. Upper GI was obtained which showed retained gastric contents in the stomach concerning for bezoar. On 1/17 EGD was performed and demonstrated that esophagus was normal. A large amount of food (residue) was found in the gastric fundus. Despite using multiple attempts, only part of the residual food was removed given the large amount and soft nature of the food. There was no evidence of any stenosis or stricture in the stomach. The endoscope easily traversed the entire upper GI tract without any difficulty. The examined duodenum was normal. The pt tolerated the procedure well and was transferred back to the floor. The pt was kept NPO and given adequate analgesia and nausea control. Her electrolytes remained normal. On 1/19 the pt went for repeat EGD which demonstrated a large amount of food (residue) was found in the gastric fundus. Removal of food was attempted using a tripod. It was unsuccessful due to the large size of the food bolus. The pt was started on erythromycin to enhance gastric motility. The pt was PO trialed on a high protein liquid diet which she tolerated. The pt was instructed to continue a high protein liquid diet for 1 month after discharge and to obtain a follow up repeat upper GI study in 1 month after discharge. Pt evaluated the pt and said she needs home PT. At the time of discharge, the patient was hemodynamically stable, was tolerating PO diet, was voiding urine and passing stool, was ambulating, and was comfortable with adequate pain control. The patient was instructed to follow up with Dr. White 7-10 days after discharge discharge from the hospital with repeat upper GI series in 1 month and to continue high protein liquid diet for a month. The pt was instructed to resume taking her warfarin after discharge, and follow up with her PCP Dr. Sanchez Silverio for INR monitoring. The pt was given a script for the repeat upper GI study and a list of diet recommendations for a high protein liquid diet. The patient felt comfortable with discharge. The patient was discharged to home with home PT, and home aid. The patient had no other issues.

## 2018-01-19 NOTE — DISCHARGE NOTE ADULT - PATIENT PORTAL LINK FT
“You can access the [facility] Patient Portal, offered by [facility], by registering with the following website: [email or web address]”

## 2018-01-19 NOTE — DISCHARGE NOTE ADULT - CARE PROVIDER_API CALL
Kain White), Surgery  [address] [facility]  [address] [place] [id number]  Phone: [phone]  Fax: [phone]

## 2018-01-19 NOTE — DISCHARGE NOTE ADULT - HOME CARE AGENCY
[facility]: Start of services 1/22 for Skilled nursing needs and PT.  [phone];  [facility] to start 1/22 10am.

## 2018-01-19 NOTE — PROGRESS NOTE ADULT - ASSESSMENT
Assessment and Plan:  47y y/o Female p/w nausea and possible gastric outlet obstruction hemodynamically stable    - Continue NPO  - Repeat EGD today, F/u GI  - Continue chemical and mechanical DVT prophylaxis  - Continue ambulation   - Encourage Incentive Spirometer use    Discuss with attending Assessment and Plan:  47y y/o Female p/w nausea and possible gastric outlet obstruction hemodynamically stable    - Continue NPO, transition to Full high protein liquid diet after EGD x1 month  - Order upper GI for pt to receive as an outpt in 1 month  - Repeat EGD today, F/u GI  - Continue chemical and mechanical DVT prophylaxis  - Continue ambulation   - Encourage Incentive Spirometer use  - May discharge today after EGD or transfer to medicine    Discuss with attending

## 2018-01-19 NOTE — DISCHARGE NOTE ADULT - CARE PLAN
Principal Discharge DX:	Gastric outlet obstruction  Goal:	h/o sleeve gastrectomy p/w gastric outlet obstruction 2/2 bezoar, Follow up with Dr. White  Assessment and plan of treatment:	Please follow up with Dr. White 7-10 days after discharge. Please continue a high protein liquid diet for 1 month. A packet has been supplied for recommendations. Please obtain a follow up repeat upper GI study in 1 month. A script has been supplied. Please call your doctor if you experience nausea, vomiting, and inability to tolerate PO diet. Principal Discharge DX:	Gastric outlet obstruction  Goal:	h/o sleeve gastrectomy p/w gastric outlet obstruction 2/2 bezoar, Follow up with Dr. White  Assessment and plan of treatment:	Please follow up with Dr. White 7-10 days after discharge. Please continue a high protein liquid diet for 1 month. A packet has been supplied for recommendations. Please obtain a follow up repeat upper GI study in 1 month. A script has been supplied. Please call your doctor if you experience nausea, vomiting, and inability to tolerate PO diet.  Goal:	Anticoagulation  Assessment and plan of treatment:	Please resume taking your warfarin after discharge. Please follow up with your PCP or cardiologist for INR monitoring. Principal Discharge DX:	Gastric outlet obstruction  Goal:	h/o sleeve gastrectomy p/w gastric outlet obstruction 2/2 bezoar, Follow up with Dr. White  Assessment and plan of treatment:	Please follow up with Dr. White 7-10 days after discharge. Please continue a high protein liquid diet for 1 month. A packet has been supplied for recommendations. Please obtain a follow up repeat upper GI study in 1 month. A script has been supplied. Please call your doctor if you experience nausea, vomiting, and inability to tolerate PO diet.  Goal:	Anticoagulation  Assessment and plan of treatment:	Please resume taking your warfarin after discharge. Please follow up with your PCP Dr. Sanchez Silverio for INR monitoring.

## 2018-01-19 NOTE — PROGRESS NOTE ADULT - SUBJECTIVE AND OBJECTIVE BOX
Bariatric Surgery Progress Note      24 hr events/Subjective:     No acute issues overnight  Pain controlled with medications  Nausea controlled with anti-emetics   Voiding    OBJECTIVE:    Vital Signs Last 24 Hrs  T(C): 36.6 (19 Jan 2018 00:23), Max: 36.8 (18 Jan 2018 14:07)  T(F): 97.9 (19 Jan 2018 00:23), Max: 98.3 (18 Jan 2018 14:07)  HR: 75 (19 Jan 2018 00:23) (74 - 78)  BP: 111/73 (19 Jan 2018 00:23) (101/59 - 122/70)  BP(mean): --  RR: 16 (19 Jan 2018 00:23) (16 - 18)  SpO2: 96% (19 Jan 2018 00:23) (95% - 97%)    I&O's Detail    17 Jan 2018 07:01  -  18 Jan 2018 07:00  --------------------------------------------------------  IN:    dextrose 5% + sodium chloride 0.45% with potassium chloride 20 mEq/L: 2700 mL    IV PiggyBack: 300 mL  Total IN: 3000 mL    OUT:    Voided: 500 mL  Total OUT: 500 mL    Total NET: 2500 mL      18 Jan 2018 07:01  -  19 Jan 2018 05:17  --------------------------------------------------------  IN:    dextrose 5% + sodium chloride 0.45% with potassium chloride 20 mEq/L: 1500 mL  Total IN: 1500 mL    OUT:  Total OUT: 0 mL    Total NET: 1500 mL    Physical Exam:    General:  Appears stated age, well-groomed, well-nourished, no distress  Chest:  clear breath sounds  Abdomen:  Soft, NT/ND, no rebound or guarding  Skin:  No rash  Neuro/Psych:  Alert, oriented to time, place and person       MEDICATIONS  (STANDING):  dextrose 5% + sodium chloride 0.45% with potassium chloride 20 mEq/L 1000 milliLiter(s) (125 mL/Hr) IV Continuous <Continuous>  dextrose 50% Injectable 25 Gram(s) IV Push once  heparin  Injectable 5000 Unit(s) SubCutaneous every 8 hours  insulin lispro (HumaLOG) corrective regimen sliding scale   SubCutaneous every 6 hours  levothyroxine Injectable 69 MICROGram(s) IV Push at bedtime  pantoprazole  Injectable 40 milliGRAM(s) IV Push daily    MEDICATIONS  (PRN):  HYDROmorphone  Injectable 0.5 milliGRAM(s) IV Push every 3 hours PRN Moderate Pain (4 - 6)      LABS:                        7.3    7.1   )-----------( 347      ( 18 Jan 2018 09:56 )             23.4       01-18    139  |  104  |  6<L>  ----------------------------<  103<H>  4.1   |  25  |  0.52    Ca    8.2<L>      18 Jan 2018 09:55  Phos  2.9     01-18  Mg     2.3     01-18      /*--- EGD 1/17/18    Findings:       The examined esophagus was normal.       A large amount of food (residue) was found in the gastric fundus. Despite using multiple        attempts, only part of the residual food was removed given the large amount and soft nature        of the food. There was no evidence of any stenosis or stricture in the stomach. The endoscope        easily traversed the entire upper GI tract without any difficulty.       The examined duodenum was normal.                                                                                                        Impression:          - No specimens collected.  Recommendation:      - [facility].                       - NPO for now.                       - Would give Erythromycin 250mg IV q8 hours to improve motility to clear the stomach. We can consider repeat endoscopy once the residual food/bezoar has passed.  EGD 1/17/18---*/

## 2018-01-19 NOTE — DISCHARGE NOTE ADULT - ADDITIONAL INSTRUCTIONS
Please follow up with Dr. White 7-10 days after discharge. Please continue a high protein liquid diet for 1 month. A packet has been supplied for recommendations. Please obtain a follow up repeat upper GI study in 1 month. A script has been supplied. Please call your doctor if you experience nausea, vomiting, and inability to tolerate PO diet.

## 2018-01-19 NOTE — DISCHARGE NOTE ADULT - PLAN OF CARE
h/o sleeve gastrectomy p/w gastric outlet obstruction 2/2 bezoar, Follow up with Dr. White Please follow up with Dr. White 7-10 days after discharge. Please continue a high protein liquid diet for 1 month. A packet has been supplied for recommendations. Please obtain a follow up repeat upper GI study in 1 month. A script has been supplied. Please call your doctor if you experience nausea, vomiting, and inability to tolerate PO diet. Anticoagulation Please resume taking your warfarin after discharge. Please follow up with your PCP or cardiologist for INR monitoring. Please resume taking your warfarin after discharge. Please follow up with your PCP Dr. Sanchez Silverio for INR monitoring.

## 2018-01-19 NOTE — PROGRESS NOTE ADULT - ATTENDING COMMENTS
I saw and examined the patient, and reviewed  the history and data with the patient and staff  Agree with note which was also reviewed and edited where appropriate.  D/W patient, RN, & residents    PO, and disposition pending results of repeat RAMILA.  Pt to be d/c'd on high protein full liquid diet when appropriate.  UGI in several weeks as outpatient to evaluate size of maría-gastric fundus.

## 2018-01-19 NOTE — DISCHARGE NOTE ADULT - MEDICATION SUMMARY - MEDICATIONS TO TAKE
I will START or STAY ON the medications listed below when I get home from the hospital:    Upper GI Series with Contrast  -- 1 unit(s)  once in 1 month from Discharge on or near 2/19/18.  -- Indication: For Repeat Upper Gi series study    acetaminophen 160 mg/5 mL oral liquid  -- 10 milliliter(s) by mouth every 6 hours, As Needed   -- This product contains acetaminophen.  Do not use  with any other product containing acetaminophen to prevent possible liver damage.    -- Indication: For mild pain    enalapril 2.5 mg oral tablet  -- 1 tab(s) by mouth once a day. Crush and mix in apple sauce  -- Indication: For High blood pressure    warfarin 7.5 mg oral tablet  -- 1 tab(s) by mouth once a day, CRUSH AND MIX IN APPLE SAUCE  -- Indication: For Anticoagulation    Lyrica 75 mg oral capsule  -- 1 cap(s) by mouth 3 times a day -  open capsule AND MIX IN APLPE SAUCE  -- Indication: For neuropathic pain    DULoxetine 60 mg oral delayed release capsule  -- 1 cap(s) by mouth once a day, for leg pain - may open and mix with unsweetened apple sauce  -- Indication: For Antidepressant    Victoza 18 mg/3 mL subcutaneous solution  -- 1 dose(s) subcutaneous once a day  -- Indication: For diabetes    meclizine 25 mg oral tablet  -- 1 tab(s) by mouth 3 times a day - CRUSH AND MIX IN APLPE SAUCE  -- Indication: For vertigo    atorvastatin 40 mg oral tablet  -- 1 tab(s) by mouth once a day, noon - CRUSH AND MIX IN APLPE SAUCE  -- Indication: For High cholesterol    furosemide 40 mg oral tablet  -- 1 tab(s) by mouth once a day, LE edema  -- Indication: For water pill    ferrous sulfate 325 mg (65 mg elemental iron) oral tablet  -- 1 tab(s) by mouth 3 times a day - CRUSH AND MIX IN APLPE SAUCE  -- Indication: For supplement    erythromycin 250 mg oral tablet  -- 1 tab(s) by mouth 3 times a day MDD:3  -- Indication: For GI motility agent    omeprazole 40 mg oral delayed release capsule  -- 1 cap(s) by mouth once a day. Open capsule and mix in apple sauce  -- It is very important that you take or use this exactly as directed.  Do not skip doses or discontinue unless directed by your doctor.  Obtain medical advice before taking any non-prescription drugs as some may affect the action of this medication.  Swallow whole.  Do not crush.    -- Indication: For GERD    buPROPion 75 mg oral tablet  -- 2 tab(s) by mouth 2 times a day - CRUSH AND MIX IN APLPE SAUCE  -- Indication: For Antidepressant    levothyroxine 137 mcg (0.137 mg) oral tablet  -- 1 tab(s) by mouth once a day - CRUSH AND MIX IN APLPE SAUCE  -- Indication: For Hypothyroidism    folic acid 1 mg oral tablet  -- 1 tab(s) by mouth once a day - CRUSH AND MIX IN APLPE SAUCE  -- Indication: For supplement    Vitamin B-12 1000 mcg oral tablet  -- 1 tab(s) by mouth once a day - CRUSH AND MIX IN APLPE SAUCE  -- Indication: For supplement    Vitamin B6 100 mg oral tablet  -- 1 tab(s) by mouth once a day - CRUSH AND MIX IN APLPE SAUCE  -- Indication: For supplement I will START or STAY ON the medications listed below when I get home from the hospital:    Upper GI Series with Contrast  -- 1 unit(s)  once in 1 month from Discharge on or near 2/19/18.  -- Indication: For Repeat Upper Gi series study    acetaminophen 160 mg/5 mL oral liquid  -- 10 milliliter(s) by mouth every 6 hours, As Needed   -- This product contains acetaminophen.  Do not use  with any other product containing acetaminophen to prevent possible liver damage.    -- Indication: For mild pain    enalapril 2.5 mg oral tablet  -- 1 tab(s) by mouth once a day. Crush and mix in apple sauce  -- Indication: For High blood pressure    warfarin 7.5 mg oral tablet  -- 1 tab(s) by mouth once a day, CRUSH AND MIX IN APPLE SAUCE  -- Indication: For Anticoagulation    Lyrica 75 mg oral capsule  -- 1 cap(s) by mouth 3 times a day -  open capsule AND MIX IN APLPE SAUCE  -- Indication: For neuropathic pain    DULoxetine 60 mg oral delayed release capsule  -- 1 cap(s) by mouth once a day, for leg pain - may open and mix with unsweetened apple sauce  -- Indication: For Antidepressant    Victoza 18 mg/3 mL subcutaneous solution  -- 1 dose(s) subcutaneous once a day  -- Indication: For diabetes    meclizine 25 mg oral tablet  -- 1 tab(s) by mouth 3 times a day - CRUSH AND MIX IN APLPE SAUCE  -- Indication: For vertigo    Reglan 10 mg oral tablet  -- 1 tab(s) by mouth 3 times a day MDD:3  -- It is very important that you take or use this exactly as directed.  Do not skip doses or discontinue unless directed by your doctor.  May cause drowsiness.  Alcohol may intensify this effect.  Use care when operating dangerous machinery.  Take medication on an empty stomach 1 hour before or 2 to 3 hours after a meal unless otherwise directed by your doctor.    -- Indication: For Gut motility agent    atorvastatin 40 mg oral tablet  -- 1 tab(s) by mouth once a day, noon - CRUSH AND MIX IN APLPE SAUCE  -- Indication: For High cholesterol    furosemide 40 mg oral tablet  -- 1 tab(s) by mouth once a day, LE edema  -- Indication: For water pill    ferrous sulfate 325 mg (65 mg elemental iron) oral tablet  -- 1 tab(s) by mouth 3 times a day - CRUSH AND MIX IN APLPE SAUCE  -- Indication: For supplement    omeprazole 40 mg oral delayed release capsule  -- 1 cap(s) by mouth once a day. Open capsule and mix in apple sauce  -- It is very important that you take or use this exactly as directed.  Do not skip doses or discontinue unless directed by your doctor.  Obtain medical advice before taking any non-prescription drugs as some may affect the action of this medication.  Swallow whole.  Do not crush.    -- Indication: For GERD    buPROPion 75 mg oral tablet  -- 2 tab(s) by mouth 2 times a day - CRUSH AND MIX IN APLPE SAUCE  -- Indication: For Antidepressant    levothyroxine 137 mcg (0.137 mg) oral tablet  -- 1 tab(s) by mouth once a day - CRUSH AND MIX IN APLPE SAUCE  -- Indication: For Hypothyroidism    folic acid 1 mg oral tablet  -- 1 tab(s) by mouth once a day - CRUSH AND MIX IN APLPE SAUCE  -- Indication: For supplement    Vitamin B-12 1000 mcg oral tablet  -- 1 tab(s) by mouth once a day - CRUSH AND MIX IN APLPE SAUCE  -- Indication: For supplement    Vitamin B6 100 mg oral tablet  -- 1 tab(s) by mouth once a day - CRUSH AND MIX IN APLPE SAUCE  -- Indication: For supplement

## 2018-01-20 LAB
ANION GAP SERPL CALC-SCNC: 13 MMOL/L — SIGNIFICANT CHANGE UP (ref 5–17)
BUN SERPL-MCNC: 5 MG/DL — LOW (ref 7–23)
CALCIUM SERPL-MCNC: 8.2 MG/DL — LOW (ref 8.4–10.5)
CHLORIDE SERPL-SCNC: 107 MMOL/L — SIGNIFICANT CHANGE UP (ref 96–108)
CO2 SERPL-SCNC: 23 MMOL/L — SIGNIFICANT CHANGE UP (ref 22–31)
CREAT SERPL-MCNC: 0.56 MG/DL — SIGNIFICANT CHANGE UP (ref 0.5–1.3)
GLUCOSE BLDC GLUCOMTR-MCNC: 79 MG/DL — SIGNIFICANT CHANGE UP (ref 70–99)
GLUCOSE BLDC GLUCOMTR-MCNC: 90 MG/DL — SIGNIFICANT CHANGE UP (ref 70–99)
GLUCOSE BLDC GLUCOMTR-MCNC: 92 MG/DL — SIGNIFICANT CHANGE UP (ref 70–99)
GLUCOSE BLDC GLUCOMTR-MCNC: 93 MG/DL — SIGNIFICANT CHANGE UP (ref 70–99)
GLUCOSE SERPL-MCNC: 79 MG/DL — SIGNIFICANT CHANGE UP (ref 70–99)
HCT VFR BLD CALC: 23.6 % — LOW (ref 34.5–45)
HCT VFR BLD CALC: 23.6 % — LOW (ref 34.5–45)
HGB BLD-MCNC: 6.6 G/DL — CRITICAL LOW (ref 11.5–15.5)
HGB BLD-MCNC: 7.5 G/DL — LOW (ref 11.5–15.5)
MCHC RBC-ENTMCNC: 22.4 PG — LOW (ref 27–34)
MCHC RBC-ENTMCNC: 24.9 PG — LOW (ref 27–34)
MCHC RBC-ENTMCNC: 28 GM/DL — LOW (ref 32–36)
MCHC RBC-ENTMCNC: 31.8 GM/DL — LOW (ref 32–36)
MCV RBC AUTO: 78.5 FL — LOW (ref 80–100)
MCV RBC AUTO: 80.3 FL — SIGNIFICANT CHANGE UP (ref 80–100)
PLATELET # BLD AUTO: 318 K/UL — SIGNIFICANT CHANGE UP (ref 150–400)
PLATELET # BLD AUTO: 323 K/UL — SIGNIFICANT CHANGE UP (ref 150–400)
POTASSIUM SERPL-MCNC: 4 MMOL/L — SIGNIFICANT CHANGE UP (ref 3.5–5.3)
POTASSIUM SERPL-SCNC: 4 MMOL/L — SIGNIFICANT CHANGE UP (ref 3.5–5.3)
RBC # BLD: 2.94 M/UL — LOW (ref 3.8–5.2)
RBC # BLD: 3.01 M/UL — LOW (ref 3.8–5.2)
RBC # FLD: 15.8 % — HIGH (ref 10.3–14.5)
RBC # FLD: 16.9 % — HIGH (ref 10.3–14.5)
SODIUM SERPL-SCNC: 143 MMOL/L — SIGNIFICANT CHANGE UP (ref 135–145)
WBC # BLD: 4.6 K/UL — SIGNIFICANT CHANGE UP (ref 3.8–10.5)
WBC # BLD: 5 K/UL — SIGNIFICANT CHANGE UP (ref 3.8–10.5)
WBC # FLD AUTO: 4.6 K/UL — SIGNIFICANT CHANGE UP (ref 3.8–10.5)
WBC # FLD AUTO: 5 K/UL — SIGNIFICANT CHANGE UP (ref 3.8–10.5)

## 2018-01-20 RX ORDER — ERYTHROMYCIN ETHYLSUCCINATE 400 MG
1 TABLET ORAL
Qty: 42 | Refills: 0 | OUTPATIENT
Start: 2018-01-20 | End: 2018-02-02

## 2018-01-20 RX ORDER — INSULIN LISPRO 100/ML
VIAL (ML) SUBCUTANEOUS
Qty: 0 | Refills: 0 | Status: DISCONTINUED | OUTPATIENT
Start: 2018-01-20 | End: 2018-01-21

## 2018-01-20 RX ORDER — INSULIN LISPRO 100/ML
VIAL (ML) SUBCUTANEOUS
Qty: 0 | Refills: 0 | Status: DISCONTINUED | OUTPATIENT
Start: 2018-01-20 | End: 2018-01-20

## 2018-01-20 RX ORDER — INSULIN LISPRO 100/ML
VIAL (ML) SUBCUTANEOUS AT BEDTIME
Qty: 0 | Refills: 0 | Status: DISCONTINUED | OUTPATIENT
Start: 2018-01-20 | End: 2018-01-21

## 2018-01-20 RX ADMIN — HYDROMORPHONE HYDROCHLORIDE 0.5 MILLIGRAM(S): 2 INJECTION INTRAMUSCULAR; INTRAVENOUS; SUBCUTANEOUS at 22:53

## 2018-01-20 RX ADMIN — HYDROMORPHONE HYDROCHLORIDE 0.5 MILLIGRAM(S): 2 INJECTION INTRAMUSCULAR; INTRAVENOUS; SUBCUTANEOUS at 05:08

## 2018-01-20 RX ADMIN — Medication 250 MILLIGRAM(S): at 14:14

## 2018-01-20 RX ADMIN — HEPARIN SODIUM 5000 UNIT(S): 5000 INJECTION INTRAVENOUS; SUBCUTANEOUS at 21:03

## 2018-01-20 RX ADMIN — HYDROMORPHONE HYDROCHLORIDE 0.5 MILLIGRAM(S): 2 INJECTION INTRAMUSCULAR; INTRAVENOUS; SUBCUTANEOUS at 11:36

## 2018-01-20 RX ADMIN — Medication 69 MICROGRAM(S): at 21:03

## 2018-01-20 RX ADMIN — HYDROMORPHONE HYDROCHLORIDE 0.5 MILLIGRAM(S): 2 INJECTION INTRAMUSCULAR; INTRAVENOUS; SUBCUTANEOUS at 23:23

## 2018-01-20 RX ADMIN — HYDROMORPHONE HYDROCHLORIDE 0.5 MILLIGRAM(S): 2 INJECTION INTRAMUSCULAR; INTRAVENOUS; SUBCUTANEOUS at 01:20

## 2018-01-20 RX ADMIN — HEPARIN SODIUM 5000 UNIT(S): 5000 INJECTION INTRAVENOUS; SUBCUTANEOUS at 05:08

## 2018-01-20 RX ADMIN — PANTOPRAZOLE SODIUM 40 MILLIGRAM(S): 20 TABLET, DELAYED RELEASE ORAL at 11:03

## 2018-01-20 RX ADMIN — HYDROMORPHONE HYDROCHLORIDE 0.5 MILLIGRAM(S): 2 INJECTION INTRAMUSCULAR; INTRAVENOUS; SUBCUTANEOUS at 00:56

## 2018-01-20 RX ADMIN — Medication 250 MILLIGRAM(S): at 05:07

## 2018-01-20 RX ADMIN — Medication 250 MILLIGRAM(S): at 21:03

## 2018-01-20 RX ADMIN — HYDROMORPHONE HYDROCHLORIDE 0.5 MILLIGRAM(S): 2 INJECTION INTRAMUSCULAR; INTRAVENOUS; SUBCUTANEOUS at 18:16

## 2018-01-20 RX ADMIN — HYDROMORPHONE HYDROCHLORIDE 0.5 MILLIGRAM(S): 2 INJECTION INTRAMUSCULAR; INTRAVENOUS; SUBCUTANEOUS at 05:38

## 2018-01-20 RX ADMIN — HYDROMORPHONE HYDROCHLORIDE 0.5 MILLIGRAM(S): 2 INJECTION INTRAMUSCULAR; INTRAVENOUS; SUBCUTANEOUS at 11:06

## 2018-01-20 NOTE — PHYSICAL THERAPY INITIAL EVALUATION ADULT - DISCHARGE DISPOSITION, PT EVAL
TBD pending further functional assessment Home with Home PT for strengthening, bed mob, transfer, gait and balance training/home w/ home PT/home/home w/ assist

## 2018-01-20 NOTE — PHYSICAL THERAPY INITIAL EVALUATION ADULT - PRECAUTIONS/LIMITATIONS, REHAB EVAL
CT abd 1/16 Moderate distention of the gastric fundus/body with debris and contrast. low-attenuation lesion inseparable from the vaginal fornix and cervix. Additional low attenuation lesion of the right ovary./fall precautions

## 2018-01-20 NOTE — PHYSICAL THERAPY INITIAL EVALUATION ADULT - LIVES WITH, PROFILE
as per pt, pt lives alone in a apt, no CAILIN, +elevator. PTA, pt independent with all ADLs and functional activities with rollator, HHA 8hrs/day. pt owns hospital bed, rollator, shower chair, and wheel chair,

## 2018-01-20 NOTE — PROGRESS NOTE ADULT - ASSESSMENT
47y y/o Female p/w nausea and possible gastric outlet obstruction hemodynamically stable.     - Full high protein liquid diet after EGD x1 month  - Upper GI for pt to receive as an outpt in 1 month  - Continue chemical and mechanical DVT prophylaxis  - Continue ambulation   - Encourage Incentive Spirometer use  - Pt already discharged to follow up in 7-10d with Dr. White, on 1 month diet of high protein liquids, with 2 weeks of Erythromycin or reglan per GI, and with repeat upper GI study in 1 month

## 2018-01-20 NOTE — PROGRESS NOTE ADULT - SUBJECTIVE AND OBJECTIVE BOX
Bariatric Surgery Progress Note      24 hr events/Subjective:     No acute issues overnight  Pain controlled with medications  Nausea controlled with anti-ematics   Voiding  Pt had EGD yesterday where not all of bezoar was evacuated. PT returned to floor late in PM and stayed overnight after not being able to PO trial on high protein liquid diet as kitchen was closed. Stable overnight. Tolerated CLD without nausea or vomiting.       Procedure:      Vital Signs Last 24 Hrs  T(C): 36.7 (19 Jan 2018 22:43), Max: 36.7 (19 Jan 2018 22:43)  T(F): 98 (19 Jan 2018 22:43), Max: 98 (19 Jan 2018 22:43)  HR: 84 (19 Jan 2018 22:43) (70 - 87)  BP: 122/71 (19 Jan 2018 22:43) (97/64 - 137/76)  BP(mean): --  RR: 18 (19 Jan 2018 22:43) (16 - 18)  SpO2: 95% (19 Jan 2018 22:43) (95% - 100%)  Height (cm): 162.56 (01-19 @ 10:30)  I&O's Summary    18 Jan 2018 07:01  -  19 Jan 2018 07:00  --------------------------------------------------------  IN: 3000 mL / OUT: 0 mL / NET: 3000 mL    19 Jan 2018 07:01  -  20 Jan 2018 01:21  --------------------------------------------------------  IN: 875 mL / OUT: 400 mL / NET: 475 mL      I&O's Detail    18 Jan 2018 07:01  -  19 Jan 2018 07:00  --------------------------------------------------------  IN:    dextrose 5% + sodium chloride 0.45% with potassium chloride 20 mEq/L: 3000 mL  Total IN: 3000 mL    OUT:  Total OUT: 0 mL    Total NET: 3000 mL      19 Jan 2018 07:01  -  20 Jan 2018 01:21  --------------------------------------------------------  IN:    dextrose 5% + sodium chloride 0.45% with potassium chloride 20 mEq/L: 875 mL  Total IN: 875 mL    OUT:    Voided: 400 mL  Total OUT: 400 mL    Total NET: 475 mL          Physical Exam:  General:  Appears stated age, well-groomed, well-nourished, no distress  Chest:  clear breath sounds  Abdomen:  Soft, NT/ND, no rebound or guarding  Skin:  No rash  Neuro/Psych:  Alert, oriented to time, place and person                           7.3    6.1   )-----------( 321      ( 19 Jan 2018 11:27 )             24.8       01-19    140  |  105  |  5<L>  ----------------------------<  89  4.0   |  27  |  0.57    Ca    8.3<L>      19 Jan 2018 11:27  Phos  2.5     01-19  Mg     2.2     01-19        dextrose 5% + sodium chloride 0.45% with potassium chloride 20 mEq/L milliLiter(s) IV Continuous <Continuous>  erythromycin     base Tablet milliGRAM(s) Oral three times a day  heparin  Injectable Unit(s) SubCutaneous every 8 hours  HYDROmorphone  Injectable milliGRAM(s) IV Push every 3 hours PRN  insulin lispro (HumaLOG) corrective regimen sliding scale  SubCutaneous every 6 hours  levothyroxine Injectable MICROGram(s) IV Push at bedtime  pantoprazole  Injectable milliGRAM(s) IV Push daily          Assessment and Plan:  47y y/o Female s/p , POD #     - Start PO liquid oxycodone PRN for pain  - Continue ambulation   - Encourage Incentive Spirometer use  - Bariartic clears   - Continue chemical and mechanical DVT prophylaxis  - Discharge home once tolerating adequate PO and 8 point discharge criteria met    Discuss with attending Bariatric Surgery Progress Note      24 hr events/Subjective:     No acute issues overnight  Pain controlled with medications  Nausea controlled with anti-emetics   Voiding  Pt had EGD yesterday where not all of bezoar was evacuated. PT returned to floor late in PM and stayed overnight after not being able to PO trial on high protein liquid diet as kitchen was closed. Stable overnight. Tolerated CLD without nausea or vomiting.       Procedure:      Vital Signs Last 24 Hrs  T(C): 36.7 (19 Jan 2018 22:43), Max: 36.7 (19 Jan 2018 22:43)  T(F): 98 (19 Jan 2018 22:43), Max: 98 (19 Jan 2018 22:43)  HR: 84 (19 Jan 2018 22:43) (70 - 87)  BP: 122/71 (19 Jan 2018 22:43) (97/64 - 137/76)  BP(mean): --  RR: 18 (19 Jan 2018 22:43) (16 - 18)  SpO2: 95% (19 Jan 2018 22:43) (95% - 100%)  Height (cm): 162.56 (01-19 @ 10:30)  I&O's Summary    18 Jan 2018 07:01  -  19 Jan 2018 07:00  --------------------------------------------------------  IN: 3000 mL / OUT: 0 mL / NET: 3000 mL    19 Jan 2018 07:01  -  20 Jan 2018 01:21  --------------------------------------------------------  IN: 875 mL / OUT: 400 mL / NET: 475 mL      I&O's Detail    18 Jan 2018 07:01  -  19 Jan 2018 07:00  --------------------------------------------------------  IN:    dextrose 5% + sodium chloride 0.45% with potassium chloride 20 mEq/L: 3000 mL  Total IN: 3000 mL    OUT:  Total OUT: 0 mL    Total NET: 3000 mL      19 Jan 2018 07:01  -  20 Jan 2018 01:21  --------------------------------------------------------  IN:    dextrose 5% + sodium chloride 0.45% with potassium chloride 20 mEq/L: 875 mL  Total IN: 875 mL    OUT:    Voided: 400 mL  Total OUT: 400 mL    Total NET: 475 mL          Physical Exam:  General:  Appears stated age, well-groomed, well-nourished, no distress  Chest:  clear breath sounds  Abdomen:  Soft, NT/ND, no rebound or guarding  Skin:  No rash  Neuro/Psych:  Alert, oriented to time, place and person                           7.3    6.1   )-----------( 321      ( 19 Jan 2018 11:27 )             24.8       01-19    140  |  105  |  5<L>  ----------------------------<  89  4.0   |  27  |  0.57    Ca    8.3<L>      19 Jan 2018 11:27  Phos  2.5     01-19  Mg     2.2     01-19        dextrose 5% + sodium chloride 0.45% with potassium chloride 20 mEq/L milliLiter(s) IV Continuous <Continuous>  erythromycin     base Tablet milliGRAM(s) Oral three times a day  heparin  Injectable Unit(s) SubCutaneous every 8 hours  HYDROmorphone  Injectable milliGRAM(s) IV Push every 3 hours PRN  insulin lispro (HumaLOG) corrective regimen sliding scale  SubCutaneous every 6 hours  levothyroxine Injectable MICROGram(s) IV Push at bedtime  pantoprazole  Injectable milliGRAM(s) IV Push daily          Assessment and Plan:  47y y/o Female s/p , POD #     - Start PO liquid oxycodone PRN for pain  - Continue ambulation   - Encourage Incentive Spirometer use  - Bariartic clears   - Continue chemical and mechanical DVT prophylaxis  - Discharge home once tolerating adequate PO and 8 point discharge criteria met    Discuss with attending

## 2018-01-20 NOTE — PROGRESS NOTE ADULT - ATTENDING COMMENTS
I saw and examined the patient, and reviewed  the history and data with the patient and staff  Agree with note which was also reviewed and edited where appropriate.  D/W patient, RN, residents and Fellow and Dr Poole

## 2018-01-20 NOTE — PHYSICAL THERAPY INITIAL EVALUATION ADULT - PERTINENT HX OF CURRENT PROBLEM, REHAB EVAL
47yoF, pmhx below. s/p laparoscopic vertical sleeve gastrectomy on 11/1/17. She had been recovering well after surgery until yesterday morning when she notes that she is unable to take PO and has had repeated episodes of nausea, emesis and reflux. She last had a bowel movement last week and states that she is no longer passing flatus.

## 2018-01-20 NOTE — CHART NOTE - NSCHARTNOTEFT_GEN_A_CORE
Paged by RN re: Hgb of 6.6. Repeat Hgb was 7.5, which is within range of the patient's baseline. The patient may be discharged home with appropriate follow up with her PCP. Paged by RN re: Hgb of 6.6. Repeat Hgb was 7.5, which is within range of the patient's baseline. The patient may be discharged home with appropriate follow up with her PCP and Dr. White.

## 2018-01-20 NOTE — CHART NOTE - NSCHARTNOTEFT_GEN_A_CORE
Spoke with Dr. Lees, hospitalist covering for Dr. Salima Goel who saw this patient during this admission. He recommended continuing her home dose of Coumadin 7.5 mg and checking her INR at her PCP's office on Monday. The patient expressed understanding and agrees with the plan.

## 2018-01-21 VITALS
RESPIRATION RATE: 18 BRPM | OXYGEN SATURATION: 97 % | HEART RATE: 84 BPM | SYSTOLIC BLOOD PRESSURE: 115 MMHG | TEMPERATURE: 98 F | DIASTOLIC BLOOD PRESSURE: 74 MMHG

## 2018-01-21 LAB
GLUCOSE BLDC GLUCOMTR-MCNC: 122 MG/DL — HIGH (ref 70–99)
GLUCOSE BLDC GLUCOMTR-MCNC: 128 MG/DL — HIGH (ref 70–99)

## 2018-01-21 PROCEDURE — 74177 CT ABD & PELVIS W/CONTRAST: CPT

## 2018-01-21 PROCEDURE — 85014 HEMATOCRIT: CPT

## 2018-01-21 PROCEDURE — 82330 ASSAY OF CALCIUM: CPT

## 2018-01-21 PROCEDURE — 83690 ASSAY OF LIPASE: CPT

## 2018-01-21 PROCEDURE — 80053 COMPREHEN METABOLIC PANEL: CPT

## 2018-01-21 PROCEDURE — 96374 THER/PROPH/DIAG INJ IV PUSH: CPT | Mod: XU

## 2018-01-21 PROCEDURE — 84100 ASSAY OF PHOSPHORUS: CPT

## 2018-01-21 PROCEDURE — 82803 BLOOD GASES ANY COMBINATION: CPT

## 2018-01-21 PROCEDURE — 85730 THROMBOPLASTIN TIME PARTIAL: CPT

## 2018-01-21 PROCEDURE — 82565 ASSAY OF CREATININE: CPT

## 2018-01-21 PROCEDURE — 81025 URINE PREGNANCY TEST: CPT

## 2018-01-21 PROCEDURE — 82962 GLUCOSE BLOOD TEST: CPT

## 2018-01-21 PROCEDURE — 99285 EMERGENCY DEPT VISIT HI MDM: CPT | Mod: 25

## 2018-01-21 PROCEDURE — 82435 ASSAY OF BLOOD CHLORIDE: CPT

## 2018-01-21 PROCEDURE — 83605 ASSAY OF LACTIC ACID: CPT

## 2018-01-21 PROCEDURE — 96375 TX/PRO/DX INJ NEW DRUG ADDON: CPT

## 2018-01-21 PROCEDURE — 84132 ASSAY OF SERUM POTASSIUM: CPT

## 2018-01-21 PROCEDURE — 85027 COMPLETE CBC AUTOMATED: CPT

## 2018-01-21 PROCEDURE — 97116 GAIT TRAINING THERAPY: CPT

## 2018-01-21 PROCEDURE — 97161 PT EVAL LOW COMPLEX 20 MIN: CPT

## 2018-01-21 PROCEDURE — 84443 ASSAY THYROID STIM HORMONE: CPT

## 2018-01-21 PROCEDURE — 82947 ASSAY GLUCOSE BLOOD QUANT: CPT

## 2018-01-21 PROCEDURE — 83036 HEMOGLOBIN GLYCOSYLATED A1C: CPT

## 2018-01-21 PROCEDURE — 84295 ASSAY OF SERUM SODIUM: CPT

## 2018-01-21 PROCEDURE — 85610 PROTHROMBIN TIME: CPT

## 2018-01-21 PROCEDURE — 82607 VITAMIN B-12: CPT

## 2018-01-21 PROCEDURE — 82746 ASSAY OF FOLIC ACID SERUM: CPT

## 2018-01-21 PROCEDURE — 80048 BASIC METABOLIC PNL TOTAL CA: CPT

## 2018-01-21 PROCEDURE — 96376 TX/PRO/DX INJ SAME DRUG ADON: CPT

## 2018-01-21 PROCEDURE — 83735 ASSAY OF MAGNESIUM: CPT

## 2018-01-21 PROCEDURE — 83550 IRON BINDING TEST: CPT

## 2018-01-21 PROCEDURE — 97110 THERAPEUTIC EXERCISES: CPT

## 2018-01-21 RX ORDER — METOCLOPRAMIDE HCL 10 MG
1 TABLET ORAL
Qty: 42 | Refills: 0
Start: 2018-01-21 | End: 2018-02-03

## 2018-01-21 RX ADMIN — HYDROMORPHONE HYDROCHLORIDE 0.5 MILLIGRAM(S): 2 INJECTION INTRAMUSCULAR; INTRAVENOUS; SUBCUTANEOUS at 11:16

## 2018-01-21 RX ADMIN — PANTOPRAZOLE SODIUM 40 MILLIGRAM(S): 20 TABLET, DELAYED RELEASE ORAL at 12:44

## 2018-01-21 RX ADMIN — HEPARIN SODIUM 5000 UNIT(S): 5000 INJECTION INTRAVENOUS; SUBCUTANEOUS at 05:58

## 2018-01-21 RX ADMIN — Medication 250 MILLIGRAM(S): at 05:58

## 2018-01-21 NOTE — PROGRESS NOTE ADULT - SUBJECTIVE AND OBJECTIVE BOX
Bariatric Surgery Progress Note      24 hr events/Subjective:     No acute issues overnight  Pain controlled with medications  Nausea controlled with anti-emetics   Voiding  Pt stable overnight. Tolerated CLD without nausea or vomiting. Awaiting ambulette to take her home and aid to help her at home.    Physical Exam:  General:  Appears stated age, well-groomed, well-nourished, no distress  Chest:  clear breath sounds  Abdomen:  Soft, NT/ND, no rebound or guarding  Skin:  No rash  Neuro/Psych:  Alert, oriented to time, place and person       Vital Signs Last 24 Hrs  T(C): 36.7 (20 Jan 2018 22:52), Max: 36.7 (20 Jan 2018 13:45)  T(F): 98.1 (20 Jan 2018 22:52), Max: 98.1 (20 Jan 2018 22:52)  HR: 82 (20 Jan 2018 22:52) (77 - 88)  BP: 126/71 (20 Jan 2018 22:52) (98/63 - 137/81)  BP(mean): --  RR: 18 (20 Jan 2018 22:52) (18 - 18)  SpO2: 95% (20 Jan 2018 22:52) (94% - 100%)    I&O's Detail    19 Jan 2018 07:01  -  20 Jan 2018 07:00  --------------------------------------------------------  IN:    dextrose 5% + sodium chloride 0.45% with potassium chloride 20 mEq/L: 875 mL    IV PiggyBack: 250 mL  Total IN: 1125 mL    OUT:    Voided: 400 mL  Total OUT: 400 mL    Total NET: 725 mL      20 Jan 2018 07:01  -  21 Jan 2018 00:46  --------------------------------------------------------  IN:    Oral Fluid: 880 mL  Total IN: 880 mL    OUT:    Voided: 1 mL  Total OUT: 1 mL    Total NET: 879 mL          MEDICATIONS  (STANDING):  erythromycin     base Tablet 250 milliGRAM(s) Oral three times a day  heparin  Injectable 5000 Unit(s) SubCutaneous every 8 hours  insulin lispro (HumaLOG) corrective regimen sliding scale   SubCutaneous three times a day before meals  insulin lispro (HumaLOG) corrective regimen sliding scale   SubCutaneous at bedtime  levothyroxine Injectable 69 MICROGram(s) IV Push at bedtime  pantoprazole  Injectable 40 milliGRAM(s) IV Push daily    MEDICATIONS  (PRN):  HYDROmorphone  Injectable 0.5 milliGRAM(s) IV Push every 3 hours PRN Moderate Pain (4 - 6)      LABS:                        7.5    4.6   )-----------( 323      ( 20 Jan 2018 13:21 )             23.6       01-20    143  |  107  |  5<L>  ----------------------------<  79  4.0   |  23  |  0.56    Ca    8.2<L>      20 Jan 2018 10:14  Phos  2.5     01-19  Mg     2.2     01-19

## 2018-01-21 NOTE — PROGRESS NOTE ADULT - ATTENDING COMMENTS
I saw and examined the pt and discussed the tx plan with the House Staff. I agree with the exam and plan as documented in the surgery resident's note from today.  Ivana Morgan MD

## 2018-01-21 NOTE — PROGRESS NOTE ADULT - ASSESSMENT
47y y/o Female p/w nausea and possible gastric outlet obstruction hemodynamically stable.     - Full high protein liquid diet after EGD x1 month  - Upper GI for pt to receive as an outpt in 1 month  - Call pharmacy/insurance to pre-approve erythromycin solution  - Continue chemical and mechanical DVT prophylaxis  - Continue ambulation   - Encourage Incentive Spirometer use  - Pt already discharged to follow up in 7-10d with Dr. White, on 1 month diet of high protein liquids, with 2 weeks of Erythromycin or reglan per GI, and with repeat upper GI study in 1 month 47y y/o Female p/w nausea and possible gastric outlet obstruction hemodynamically stable.     - Full high protein liquid diet after EGD x1 month  - Upper GI for pt to receive as an outpt in 1 month  - Continue chemical and mechanical DVT prophylaxis  - Continue ambulation   - Encourage Incentive Spirometer use  - Pt already discharged to follow up in 7-10d with Dr. White, on 1 month diet of high protein liquids, with 2 weeks of Erythromycin or reglan per GI, and with repeat upper GI study in 1 month  - Pt pending medicaid authorization for ambulette and for re-instating home aids she had prior to discharge, ready for discharge once set up

## 2018-01-23 ENCOUNTER — LABORATORY RESULT (OUTPATIENT)
Age: 48
End: 2018-01-23

## 2018-01-29 ENCOUNTER — APPOINTMENT (OUTPATIENT)
Dept: SURGERY | Facility: CLINIC | Age: 48
End: 2018-01-29
Payer: MEDICARE

## 2018-01-29 VITALS
SYSTOLIC BLOOD PRESSURE: 129 MMHG | TEMPERATURE: 98.7 F | OXYGEN SATURATION: 97 % | DIASTOLIC BLOOD PRESSURE: 84 MMHG | HEART RATE: 114 BPM | RESPIRATION RATE: 16 BRPM

## 2018-01-29 VITALS — WEIGHT: 293 LBS | HEIGHT: 64 IN | BODY MASS INDEX: 50.02 KG/M2

## 2018-01-29 PROCEDURE — 99024 POSTOP FOLLOW-UP VISIT: CPT

## 2018-01-30 ENCOUNTER — APPOINTMENT (OUTPATIENT)
Dept: VASCULAR SURGERY | Facility: CLINIC | Age: 48
End: 2018-01-30
Payer: MEDICARE

## 2018-01-30 PROCEDURE — 93970 EXTREMITY STUDY: CPT

## 2018-01-30 PROCEDURE — 99213 OFFICE O/P EST LOW 20 MIN: CPT

## 2018-02-02 ENCOUNTER — APPOINTMENT (OUTPATIENT)
Age: 48
End: 2018-02-02
Payer: MEDICARE

## 2018-02-02 ENCOUNTER — LABORATORY RESULT (OUTPATIENT)
Age: 48
End: 2018-02-02

## 2018-02-02 PROCEDURE — 37193Z: CUSTOM

## 2018-02-07 ENCOUNTER — LABORATORY RESULT (OUTPATIENT)
Age: 48
End: 2018-02-07

## 2018-02-08 ENCOUNTER — MEDICATION RENEWAL (OUTPATIENT)
Age: 48
End: 2018-02-08

## 2018-02-13 ENCOUNTER — APPOINTMENT (OUTPATIENT)
Dept: SURGERY | Facility: CLINIC | Age: 48
End: 2018-02-13
Payer: MEDICARE

## 2018-02-13 VITALS — WEIGHT: 293 LBS | HEIGHT: 64 IN | BODY MASS INDEX: 50.02 KG/M2

## 2018-02-13 VITALS
HEART RATE: 89 BPM | SYSTOLIC BLOOD PRESSURE: 143 MMHG | RESPIRATION RATE: 16 BRPM | DIASTOLIC BLOOD PRESSURE: 82 MMHG | TEMPERATURE: 98.6 F | OXYGEN SATURATION: 99 %

## 2018-02-13 PROCEDURE — 99213 OFFICE O/P EST LOW 20 MIN: CPT

## 2018-02-20 ENCOUNTER — LABORATORY RESULT (OUTPATIENT)
Age: 48
End: 2018-02-20

## 2018-02-21 ENCOUNTER — APPOINTMENT (OUTPATIENT)
Dept: BARIATRICS/WEIGHT MGMT | Facility: CLINIC | Age: 48
End: 2018-02-21
Payer: MEDICARE

## 2018-02-21 VITALS
BODY MASS INDEX: 49.85 KG/M2 | WEIGHT: 292 LBS | OXYGEN SATURATION: 98 % | SYSTOLIC BLOOD PRESSURE: 120 MMHG | HEIGHT: 64 IN | DIASTOLIC BLOOD PRESSURE: 80 MMHG | HEART RATE: 95 BPM

## 2018-02-21 PROCEDURE — 99213 OFFICE O/P EST LOW 20 MIN: CPT

## 2018-02-21 RX ORDER — LIRAGLUTIDE 6 MG/ML
18 INJECTION SUBCUTANEOUS DAILY
Qty: 1 | Refills: 5 | Status: DISCONTINUED | COMMUNITY
Start: 2017-01-30 | End: 2018-02-21

## 2018-02-21 RX ORDER — PEN NEEDLE, DIABETIC 31 G X1/4"
31G X 6 MM NEEDLE, DISPOSABLE MISCELLANEOUS
Qty: 100 | Refills: 0 | Status: DISCONTINUED | COMMUNITY
Start: 2017-01-30 | End: 2018-02-21

## 2018-02-21 RX ORDER — FERROUS SULFATE 325(65) MG
325 TABLET ORAL
Refills: 0 | Status: DISCONTINUED | COMMUNITY
End: 2018-02-21

## 2018-02-21 RX ORDER — PEN NEEDLE, DIABETIC 32 GX 1/6"
32G X 4 MM NEEDLE, DISPOSABLE MISCELLANEOUS
Qty: 1 | Refills: 1 | Status: DISCONTINUED | COMMUNITY
Start: 2017-01-30 | End: 2018-02-21

## 2018-02-21 RX ORDER — PANTOPRAZOLE 40 MG/1
40 TABLET, DELAYED RELEASE ORAL DAILY
Qty: 30 | Refills: 3 | Status: DISCONTINUED | COMMUNITY
Start: 2017-11-02 | End: 2018-02-21

## 2018-02-21 RX ORDER — NAPROXEN SODIUM 550 MG/1
550 TABLET ORAL
Qty: 14 | Refills: 0 | Status: DISCONTINUED | COMMUNITY
Start: 2017-06-23 | End: 2018-02-21

## 2018-02-21 RX ORDER — DOCUSATE SODIUM 100 MG
100 TABLET ORAL DAILY
Qty: 30 | Refills: 1 | Status: DISCONTINUED | COMMUNITY
Start: 2018-01-05 | End: 2018-02-21

## 2018-02-22 ENCOUNTER — LABORATORY RESULT (OUTPATIENT)
Age: 48
End: 2018-02-22

## 2018-02-22 ENCOUNTER — MEDICATION RENEWAL (OUTPATIENT)
Age: 48
End: 2018-02-22

## 2018-02-23 ENCOUNTER — MEDICATION RENEWAL (OUTPATIENT)
Age: 48
End: 2018-02-23

## 2018-02-23 RX ORDER — LANCETS 28 GAUGE
EACH MISCELLANEOUS
Qty: 1 | Refills: 3 | Status: ACTIVE | COMMUNITY
Start: 2018-02-23 | End: 1900-01-01

## 2018-03-05 ENCOUNTER — APPOINTMENT (OUTPATIENT)
Dept: SURGERY | Facility: CLINIC | Age: 48
End: 2018-03-05
Payer: MEDICARE

## 2018-03-05 VITALS
HEART RATE: 95 BPM | OXYGEN SATURATION: 89 % | BODY MASS INDEX: 49.34 KG/M2 | DIASTOLIC BLOOD PRESSURE: 88 MMHG | WEIGHT: 289 LBS | SYSTOLIC BLOOD PRESSURE: 119 MMHG | HEIGHT: 64 IN

## 2018-03-05 PROCEDURE — 99213 OFFICE O/P EST LOW 20 MIN: CPT

## 2018-03-09 ENCOUNTER — LABORATORY RESULT (OUTPATIENT)
Age: 48
End: 2018-03-09

## 2018-03-14 ENCOUNTER — RX RENEWAL (OUTPATIENT)
Age: 48
End: 2018-03-14

## 2018-03-15 ENCOUNTER — RESULT CHARGE (OUTPATIENT)
Age: 48
End: 2018-03-15

## 2018-03-16 ENCOUNTER — LABORATORY RESULT (OUTPATIENT)
Age: 48
End: 2018-03-16

## 2018-03-16 ENCOUNTER — APPOINTMENT (OUTPATIENT)
Dept: INTERNAL MEDICINE | Facility: CLINIC | Age: 48
End: 2018-03-16
Payer: MEDICARE

## 2018-03-16 VITALS
BODY MASS INDEX: 49.17 KG/M2 | HEART RATE: 86 BPM | HEIGHT: 64 IN | WEIGHT: 288 LBS | RESPIRATION RATE: 16 BRPM | TEMPERATURE: 97.8 F | DIASTOLIC BLOOD PRESSURE: 87 MMHG | SYSTOLIC BLOOD PRESSURE: 140 MMHG | OXYGEN SATURATION: 96 %

## 2018-03-16 DIAGNOSIS — L65.0 TELOGEN EFFLUVIUM: ICD-10-CM

## 2018-03-16 DIAGNOSIS — K31.84 GASTROPARESIS: ICD-10-CM

## 2018-03-16 LAB
INR PPP: 3 RATIO
POCT-PROTHROMBIN TIME: 35.8 SECS

## 2018-03-16 PROCEDURE — 85610 PROTHROMBIN TIME: CPT | Mod: QW

## 2018-03-16 PROCEDURE — 99214 OFFICE O/P EST MOD 30 MIN: CPT

## 2018-03-16 RX ORDER — OXYCODONE HYDROCHLORIDE 5 MG/5ML
5 SOLUTION ORAL
Qty: 80 | Refills: 0 | Status: COMPLETED | COMMUNITY
Start: 2017-11-02

## 2018-03-16 RX ORDER — TAMSULOSIN HYDROCHLORIDE 0.4 MG/1
0.4 CAPSULE ORAL
Qty: 7 | Refills: 0 | Status: COMPLETED | COMMUNITY
Start: 2017-12-06

## 2018-03-16 RX ORDER — METOCLOPRAMIDE 10 MG/1
10 TABLET ORAL
Qty: 42 | Refills: 0 | Status: COMPLETED | COMMUNITY
Start: 2018-01-19

## 2018-03-16 RX ORDER — BUPROPION HYDROCHLORIDE 100 MG/1
100 TABLET, FILM COATED ORAL 3 TIMES DAILY
Qty: 90 | Refills: 2 | Status: COMPLETED | COMMUNITY
Start: 2017-09-16 | End: 2018-03-16

## 2018-03-16 RX ORDER — IBUPROFEN 600 MG/1
600 TABLET ORAL
Qty: 30 | Refills: 0 | Status: COMPLETED | COMMUNITY
Start: 2017-12-06

## 2018-03-16 RX ORDER — ONDANSETRON 4 MG/1
4 TABLET, ORALLY DISINTEGRATING ORAL
Qty: 30 | Refills: 0 | Status: COMPLETED | COMMUNITY
Start: 2017-11-02

## 2018-03-16 RX ORDER — BLOOD-GLUCOSE METER
KIT MISCELLANEOUS
Qty: 1 | Refills: 0 | Status: COMPLETED | COMMUNITY
Start: 2017-09-08 | End: 2018-03-16

## 2018-03-16 RX ORDER — ENOXAPARIN SODIUM 100 MG/ML
40 INJECTION SUBCUTANEOUS
Qty: 6 | Refills: 0 | Status: COMPLETED | COMMUNITY
Start: 2017-11-02

## 2018-03-16 RX ORDER — ERYTHROMYCIN 250 MG/1
250 TABLET, FILM COATED ORAL
Qty: 10 | Refills: 0 | Status: COMPLETED | COMMUNITY
Start: 2018-01-20

## 2018-03-16 RX ORDER — OMEPRAZOLE 40 MG/1
40 CAPSULE, DELAYED RELEASE ORAL
Qty: 14 | Refills: 0 | Status: COMPLETED | COMMUNITY
Start: 2017-11-02

## 2018-03-20 ENCOUNTER — LABORATORY RESULT (OUTPATIENT)
Age: 48
End: 2018-03-20

## 2018-03-23 ENCOUNTER — MEDICATION RENEWAL (OUTPATIENT)
Age: 48
End: 2018-03-23

## 2018-03-23 LAB
25(OH)D3 SERPL-MCNC: 23.5 NG/ML
ALBUMIN SERPL ELPH-MCNC: 3.8 G/DL
ALP BLD-CCNC: 67 U/L
ALT SERPL-CCNC: 16 U/L
ANION GAP SERPL CALC-SCNC: 14 MMOL/L
APPEARANCE: ABNORMAL
AST SERPL-CCNC: 19 U/L
BASOPHILS # BLD AUTO: 0.03 K/UL
BASOPHILS NFR BLD AUTO: 0.5 %
BILIRUB SERPL-MCNC: 0.2 MG/DL
BILIRUBIN URINE: NEGATIVE
BLOOD URINE: NEGATIVE
BUN SERPL-MCNC: 10 MG/DL
CALCIUM SERPL-MCNC: 9.5 MG/DL
CHLORIDE SERPL-SCNC: 104 MMOL/L
CHOLEST SERPL-MCNC: 122 MG/DL
CHOLEST/HDLC SERPL: 3.6 RATIO
CO2 SERPL-SCNC: 22 MMOL/L
COLOR: ABNORMAL
CREAT SERPL-MCNC: 0.59 MG/DL
CREAT SPEC-SCNC: 227 MG/DL
EOSINOPHIL # BLD AUTO: 0.26 K/UL
EOSINOPHIL NFR BLD AUTO: 4.2 %
FOLATE SERPL-MCNC: 18 NG/ML
GLUCOSE QUALITATIVE U: NEGATIVE MG/DL
GLUCOSE SERPL-MCNC: 77 MG/DL
HBA1C MFR BLD HPLC: 4.7 %
HCT VFR BLD CALC: 40.9 %
HDLC SERPL-MCNC: 34 MG/DL
HGB BLD-MCNC: 11.3 G/DL
IMM GRANULOCYTES NFR BLD AUTO: 0 %
IRON SATN MFR SERPL: 10 %
IRON SERPL-MCNC: 31 UG/DL
KETONES URINE: NEGATIVE
LDLC SERPL CALC-MCNC: 74 MG/DL
LEUKOCYTE ESTERASE URINE: ABNORMAL
LYMPHOCYTES # BLD AUTO: 1.25 K/UL
LYMPHOCYTES NFR BLD AUTO: 20.4 %
MAGNESIUM SERPL-MCNC: 2.2 MG/DL
MAN DIFF?: NORMAL
MCHC RBC-ENTMCNC: 22 PG
MCHC RBC-ENTMCNC: 27.6 GM/DL
MCV RBC AUTO: 79.6 FL
MICROALBUMIN 24H UR DL<=1MG/L-MCNC: 1.9 MG/DL
MICROALBUMIN/CREAT 24H UR-RTO: 8 MG/G
MONOCYTES # BLD AUTO: 0.69 K/UL
MONOCYTES NFR BLD AUTO: 11.2 %
NEUTROPHILS # BLD AUTO: 3.91 K/UL
NEUTROPHILS NFR BLD AUTO: 63.7 %
NITRITE URINE: NEGATIVE
NT-PROBNP SERPL-MCNC: 48 PG/ML
PH URINE: 5.5
PHOSPHATE SERPL-MCNC: 3.2 MG/DL
PLATELET # BLD AUTO: 299 K/UL
POTASSIUM SERPL-SCNC: 4.4 MMOL/L
PROT SERPL-MCNC: 8.7 G/DL
PROTEIN URINE: NEGATIVE MG/DL
RBC # BLD: 5.14 M/UL
RBC # FLD: 19 %
SODIUM SERPL-SCNC: 140 MMOL/L
SPECIFIC GRAVITY URINE: 1.03
TIBC SERPL-MCNC: 311 UG/DL
TRIGL SERPL-MCNC: 68 MG/DL
TSH SERPL-ACNC: 0.62 UIU/ML
UIBC SERPL-MCNC: 280 UG/DL
UROBILINOGEN URINE: NEGATIVE MG/DL
VIT A SERPL-MCNC: 28 UG/DL
VIT B1 SERPL-MCNC: 103.8 NMOL/L
VIT B12 SERPL-MCNC: 752 PG/ML
WBC # FLD AUTO: 6.14 K/UL

## 2018-03-26 ENCOUNTER — APPOINTMENT (OUTPATIENT)
Dept: BARIATRICS/WEIGHT MGMT | Facility: CLINIC | Age: 48
End: 2018-03-26
Payer: MEDICARE

## 2018-03-26 VITALS
BODY MASS INDEX: 48.49 KG/M2 | DIASTOLIC BLOOD PRESSURE: 74 MMHG | SYSTOLIC BLOOD PRESSURE: 136 MMHG | OXYGEN SATURATION: 98 % | HEART RATE: 106 BPM | HEIGHT: 64 IN | WEIGHT: 284 LBS

## 2018-03-26 PROCEDURE — 99214 OFFICE O/P EST MOD 30 MIN: CPT

## 2018-04-04 ENCOUNTER — LABORATORY RESULT (OUTPATIENT)
Age: 48
End: 2018-04-04

## 2018-04-18 ENCOUNTER — LABORATORY RESULT (OUTPATIENT)
Age: 48
End: 2018-04-18

## 2018-04-23 ENCOUNTER — APPOINTMENT (OUTPATIENT)
Dept: BARIATRICS/WEIGHT MGMT | Facility: CLINIC | Age: 48
End: 2018-04-23
Payer: MEDICARE

## 2018-04-23 VITALS
HEART RATE: 88 BPM | DIASTOLIC BLOOD PRESSURE: 84 MMHG | SYSTOLIC BLOOD PRESSURE: 134 MMHG | OXYGEN SATURATION: 98 % | HEIGHT: 64 IN | WEIGHT: 274 LBS | BODY MASS INDEX: 46.78 KG/M2

## 2018-04-23 PROCEDURE — 99214 OFFICE O/P EST MOD 30 MIN: CPT

## 2018-05-01 ENCOUNTER — MEDICATION RENEWAL (OUTPATIENT)
Age: 48
End: 2018-05-01

## 2018-05-02 ENCOUNTER — LABORATORY RESULT (OUTPATIENT)
Age: 48
End: 2018-05-02

## 2018-05-07 ENCOUNTER — APPOINTMENT (OUTPATIENT)
Dept: SURGERY | Facility: CLINIC | Age: 48
End: 2018-05-07
Payer: MEDICARE

## 2018-05-07 VITALS
DIASTOLIC BLOOD PRESSURE: 73 MMHG | TEMPERATURE: 98.7 F | HEIGHT: 64 IN | BODY MASS INDEX: 46.18 KG/M2 | HEART RATE: 91 BPM | SYSTOLIC BLOOD PRESSURE: 117 MMHG | RESPIRATION RATE: 16 BRPM | OXYGEN SATURATION: 97 % | WEIGHT: 270.5 LBS

## 2018-05-07 PROCEDURE — 99213 OFFICE O/P EST LOW 20 MIN: CPT

## 2018-05-07 RX ORDER — ACETAMINOPHEN 325 MG
TABLET ORAL
Refills: 0 | Status: ACTIVE | COMMUNITY

## 2018-05-07 RX ORDER — MULTIVITAMIN
CAPSULE ORAL
Refills: 0 | Status: ACTIVE | COMMUNITY

## 2018-05-08 ENCOUNTER — MED ADMIN CHARGE (OUTPATIENT)
Age: 48
End: 2018-05-08

## 2018-05-15 ENCOUNTER — APPOINTMENT (OUTPATIENT)
Dept: INTERNAL MEDICINE | Facility: CLINIC | Age: 48
End: 2018-05-15
Payer: MEDICARE

## 2018-05-15 VITALS
SYSTOLIC BLOOD PRESSURE: 133 MMHG | DIASTOLIC BLOOD PRESSURE: 82 MMHG | TEMPERATURE: 98.5 F | HEART RATE: 89 BPM | BODY MASS INDEX: 46.26 KG/M2 | RESPIRATION RATE: 14 BRPM | WEIGHT: 271 LBS | OXYGEN SATURATION: 98 % | HEIGHT: 64 IN

## 2018-05-15 DIAGNOSIS — M79.671 PAIN IN RIGHT FOOT: ICD-10-CM

## 2018-05-15 DIAGNOSIS — L65.9 NONSCARRING HAIR LOSS, UNSPECIFIED: ICD-10-CM

## 2018-05-15 DIAGNOSIS — M79.672 PAIN IN RIGHT FOOT: ICD-10-CM

## 2018-05-15 PROCEDURE — 99214 OFFICE O/P EST MOD 30 MIN: CPT

## 2018-05-15 PROCEDURE — 85610 PROTHROMBIN TIME: CPT | Mod: QW

## 2018-05-15 RX ORDER — FOLIC ACID 20 MG
CAPSULE ORAL
Refills: 0 | Status: COMPLETED | COMMUNITY
End: 2018-05-15

## 2018-05-17 ENCOUNTER — LABORATORY RESULT (OUTPATIENT)
Age: 48
End: 2018-05-17

## 2018-05-19 PROBLEM — M79.671 PAIN IN BOTH FEET: Status: ACTIVE | Noted: 2018-05-15

## 2018-05-19 PROBLEM — L65.9 HAIR LOSS: Status: ACTIVE | Noted: 2018-05-15

## 2018-05-19 NOTE — ASSESSMENT
[FreeTextEntry1] : 47 yr old female w/hx of morbid obesity, HTN, DM, hypothyroidism, cervical radiculopathy, diabetic neuropathy, Hx of thrombophilia (PE due to obesity and sedentary state), low back pain and chronic pain syndrome.  Pt here for F/U of ongoing issues and lab work.  On coumadin but at 5mg - need to check INR.  Hair loss reviewed w/pt - to see Derm.  Refilling percocet, ordering flector patch for topical application to feet RE: pain.  Ambulatory dysfunction due to her chronic foot & leg pain, as well as neuropathy, make pt a fall risk.  HTN, HLD, IGT, Anemia/MARCIAL, thrombophilia all clinically stable.  Staring B12 and Folic acid supplementation.\par \par Counseled patient for greater than 50% of this visit, including diagnoses information, medication usage and side effects, therapeutic goals and options, and followup. Patient's questions were answered. Patient expressed understanding of, and agreement with, assessment and plan.

## 2018-05-19 NOTE — PHYSICAL EXAM
[No Acute Distress] : no acute distress [Well Nourished] : well nourished [Well Developed] : well developed [Well-Appearing] : well-appearing [Normal Voice/Communication] : normal voice/communication [Normal Sclera/Conjunctiva] : normal sclera/conjunctiva [EOMI] : extraocular movements intact [Normal Outer Ear/Nose] : the outer ears and nose were normal in appearance [No Respiratory Distress] : no respiratory distress  [Clear to Auscultation] : lungs were clear to auscultation bilaterally [No Accessory Muscle Use] : no accessory muscle use [Normal Rate] : normal rate  [Regular Rhythm] : with a regular rhythm [Normal S1, S2] : normal S1 and S2 [No Murmur] : no murmur heard [Soft] : abdomen soft [No Masses] : no abdominal mass palpated [Normal Bowel Sounds] : normal bowel sounds [No Joint Swelling] : no joint swelling [Grossly Normal Strength/Tone] : grossly normal strength/tone [No Rash] : no rash [Normal Gait] : normal gait [Coordination Grossly Intact] : coordination grossly intact [No Focal Deficits] : no focal deficits [Speech Grossly Normal] : speech grossly normal [Memory Grossly Normal] : memory grossly normal [Normal Affect] : the affect was normal [Alert and Oriented x3] : oriented to person, place, and time [Normal Mood] : the mood was normal [Normal Insight/Judgement] : insight and judgment were intact [de-identified] : obese [de-identified] : knees swollen.  Tender to palp 2/2 neuropathy

## 2018-05-19 NOTE — HISTORY OF PRESENT ILLNESS
[de-identified] : 48 yo woman with morbid obesity, dm2, hypothyroidism, chronic peripheral neuropathy, severe YVONNE and hx of 2 PEs (VTE) on chronic coumadin returns for F/U. S/P gastric sleeve surgery. \par \par Chronic anticoagulation, on Coumadin = INRs therapeutic on 5mg.  Denies any cramping or pain of calves, chest pain, MOREIRA, easy bruising, HA, weakness, epistaxis, hemoptysis, hematuria, melena or hematochezia.\par \par Despite losing wgt, she still has ambulatory dysfunction secondary to DM neuropathy and B/L arthritis of knees and feet.  As such, she is a fall risk.  Her bathroom is off her kitchen, and is too far for pt to safely walk to it.  Pt is confined to her room, and requires a bedside commode.  \par \par Diabetic neuropathy is worse as pt tries to walk more.  Soles of feet are numb but feels painful pressure in feet.  On daily duloxetine 60mg, and recently had Lyrica raised to 75mg TID.  Currently uses percocet 5/325 once at bedtime, and can use another one earlier in day if sx bother her sooner than evening.  Used to get sedated on it, but now no longer.  Percocet brings pain down from 9/10 to 6-7/10.  In am, using a compound pain cream that helps during AM.  Needs diclofenac 3% which helped her sx, but insurance not covering it.  Not supposed to take oral NSAIDs due to bypass surgery.\par \par S/P bariatric surgery = Following w/Dr. White\par \par C/O of hair loss that seemed to start after bariatric surgery.  Pt stated she was told by a dermatologist that it was due to surgery, and sudden change in her nutrition status.\par \par Chronic anemia due to heavy menstruation, and hx of sleep apnea, causing chronic fatigue.  No changes in sx at present.  Due for repeat labs.

## 2018-05-19 NOTE — REVIEW OF SYSTEMS
[Negative] : Heme/Lymph [Itching] : no itching [Mole Changes] : no mole changes [Nail Changes] : no nail changes [Skin Rash] : no skin rash [FreeTextEntry2] : losing wgt [de-identified] : hair loss [de-identified] : B/L foot neuropathy 2/2 DM

## 2018-05-31 ENCOUNTER — LABORATORY RESULT (OUTPATIENT)
Age: 48
End: 2018-05-31

## 2018-06-08 ENCOUNTER — MEDICATION RENEWAL (OUTPATIENT)
Age: 48
End: 2018-06-08

## 2018-06-12 ENCOUNTER — MEDICATION RENEWAL (OUTPATIENT)
Age: 48
End: 2018-06-12

## 2018-06-13 ENCOUNTER — LABORATORY RESULT (OUTPATIENT)
Age: 48
End: 2018-06-13

## 2018-06-18 ENCOUNTER — APPOINTMENT (OUTPATIENT)
Dept: BARIATRICS/WEIGHT MGMT | Facility: CLINIC | Age: 48
End: 2018-06-18
Payer: MEDICARE

## 2018-06-18 VITALS
BODY MASS INDEX: 45.13 KG/M2 | SYSTOLIC BLOOD PRESSURE: 110 MMHG | WEIGHT: 264.33 LBS | DIASTOLIC BLOOD PRESSURE: 70 MMHG | HEIGHT: 64 IN

## 2018-06-18 PROCEDURE — 99214 OFFICE O/P EST MOD 30 MIN: CPT

## 2018-06-18 RX ORDER — POLYETHYLENE GLYCOL 3350 17 G/17G
17 POWDER, FOR SOLUTION ORAL
Qty: 476 | Refills: 3 | Status: COMPLETED | COMMUNITY
Start: 2018-01-29 | End: 2018-06-18

## 2018-06-18 RX ORDER — WARFARIN 1 MG/1
1 TABLET ORAL
Qty: 30 | Refills: 1 | Status: COMPLETED | COMMUNITY
Start: 2017-07-21 | End: 2018-06-18

## 2018-06-18 RX ORDER — SENNOSIDES 8.6 MG/1
8.6 CAPSULE, GELATIN COATED ORAL
Qty: 10 | Refills: 0 | Status: COMPLETED | COMMUNITY
Start: 2018-01-29 | End: 2018-06-18

## 2018-06-27 ENCOUNTER — LABORATORY RESULT (OUTPATIENT)
Age: 48
End: 2018-06-27

## 2018-07-10 ENCOUNTER — APPOINTMENT (OUTPATIENT)
Dept: INTERNAL MEDICINE | Facility: CLINIC | Age: 48
End: 2018-07-10
Payer: MEDICARE

## 2018-07-10 VITALS
TEMPERATURE: 97.6 F | HEIGHT: 64 IN | WEIGHT: 262 LBS | HEART RATE: 104 BPM | BODY MASS INDEX: 44.73 KG/M2 | RESPIRATION RATE: 14 BRPM | DIASTOLIC BLOOD PRESSURE: 65 MMHG | SYSTOLIC BLOOD PRESSURE: 108 MMHG | OXYGEN SATURATION: 99 %

## 2018-07-10 DIAGNOSIS — E55.9 VITAMIN D DEFICIENCY, UNSPECIFIED: ICD-10-CM

## 2018-07-10 DIAGNOSIS — D68.59 OTHER PRIMARY THROMBOPHILIA: ICD-10-CM

## 2018-07-10 DIAGNOSIS — E53.8 DEFICIENCY OF OTHER SPECIFIED B GROUP VITAMINS: ICD-10-CM

## 2018-07-10 PROCEDURE — 99214 OFFICE O/P EST MOD 30 MIN: CPT

## 2018-07-11 LAB
25(OH)D3 SERPL-MCNC: 36.9 NG/ML
ALBUMIN SERPL ELPH-MCNC: 3.9 G/DL
ALP BLD-CCNC: 68 U/L
ALT SERPL-CCNC: 20 U/L
ANION GAP SERPL CALC-SCNC: 14 MMOL/L
APPEARANCE: CLEAR
AST SERPL-CCNC: 29 U/L
BASOPHILS # BLD AUTO: 0.03 K/UL
BASOPHILS NFR BLD AUTO: 0.5 %
BILIRUB SERPL-MCNC: <0.2 MG/DL
BILIRUBIN URINE: NEGATIVE
BLOOD URINE: NEGATIVE
BUN SERPL-MCNC: 14 MG/DL
CALCIUM SERPL-MCNC: 8.9 MG/DL
CHLORIDE SERPL-SCNC: 100 MMOL/L
CHOLEST SERPL-MCNC: 99 MG/DL
CHOLEST/HDLC SERPL: 2.8 RATIO
CO2 SERPL-SCNC: 25 MMOL/L
COLOR: YELLOW
CREAT SERPL-MCNC: 0.5 MG/DL
EOSINOPHIL # BLD AUTO: 0.16 K/UL
EOSINOPHIL NFR BLD AUTO: 2.6 %
ESTIMATED AVERAGE GLUCOSE: 94 MG/DL
FOLATE SERPL-MCNC: >20 NG/ML
GLUCOSE QUALITATIVE U: NEGATIVE MG/DL
GLUCOSE SERPL-MCNC: 65 MG/DL
HBA1C MFR BLD HPLC: 4.9 %
HCT VFR BLD CALC: 27.7 %
HCYS SERPL-MCNC: 6.4 UMOL/L
HDLC SERPL-MCNC: 36 MG/DL
HGB BLD-MCNC: 7.4 G/DL
IMM GRANULOCYTES NFR BLD AUTO: 0.5 %
IRON SERPL-MCNC: 15 UG/DL
KETONES URINE: NEGATIVE
LDLC SERPL CALC-MCNC: 53 MG/DL
LEUKOCYTE ESTERASE URINE: NEGATIVE
LYMPHOCYTES # BLD AUTO: 1.18 K/UL
LYMPHOCYTES NFR BLD AUTO: 19.1 %
MAN DIFF?: NORMAL
MCHC RBC-ENTMCNC: 20.1 PG
MCHC RBC-ENTMCNC: 26.7 GM/DL
MCV RBC AUTO: 75.1 FL
MONOCYTES # BLD AUTO: 0.58 K/UL
MONOCYTES NFR BLD AUTO: 9.4 %
NEUTROPHILS # BLD AUTO: 4.19 K/UL
NEUTROPHILS NFR BLD AUTO: 67.9 %
NITRITE URINE: NEGATIVE
NT-PROBNP SERPL-MCNC: 120 PG/ML
PH URINE: 5.5
PLATELET # BLD AUTO: 374 K/UL
POTASSIUM SERPL-SCNC: 4.5 MMOL/L
PROT SERPL-MCNC: 7.8 G/DL
PROTEIN URINE: NEGATIVE MG/DL
RBC # BLD: 3.69 M/UL
RBC # FLD: 15.8 %
SODIUM SERPL-SCNC: 139 MMOL/L
SPECIFIC GRAVITY URINE: 1.02
TRIGL SERPL-MCNC: 48 MG/DL
TSH SERPL-ACNC: 0.68 UIU/ML
UROBILINOGEN URINE: NEGATIVE MG/DL
VIT B12 SERPL-MCNC: 1013 PG/ML
WBC # FLD AUTO: 6.17 K/UL

## 2018-07-11 NOTE — HISTORY OF PRESENT ILLNESS
[de-identified] : 46 yo woman with morbid obesity, dm2, hypothyroidism, chronic peripheral neuropathy, severe YVONNE and hx of 2 PEs (VTE) on chronic coumadin returns for F/U. S/P gastric sleeve surgery. \par \par Chronic anticoagulation, on Coumadin = INRs therapeutic on 6mg.  Denies any cramping or pain of calves, chest pain, MOREIRA, easy bruising, HA, weakness, epistaxis, hemoptysis, hematuria, melena or hematochezia.\par \par C/O urinary frequency x1 week.  No F/c/NS, hematuria, flank or pelvic pain.  \par \par Despite losing wgt, she still has ambulatory dysfunction secondary to DM neuropathy and B/L arthritis of knees and feet.  As such, she is a fall risk.  Her bathroom is off her kitchen, and is too far for pt to safely walk to it.  Pt is confined to her room, and requires a bedside commode.  \par \par Diabetic neuropathy is worse as pt tries to walk more.  Soles of feet are numb but feels painful pressure in feet.  On daily duloxetine 60mg, and recently had Lyrica raised to 75mg TID.  Currently uses percocet 5/325 once at bedtime, and can use another one earlier in day if sx bother her sooner than evening.  Used to get sedated on it, but now no longer.  Percocet brings pain down from 9/10 to 6-7/10.  In am, using a compound pain cream that helps during AM.  Needs diclofenac 3% which helped her sx, but insurance not covering it.  Not supposed to take oral NSAIDs due to bypass surgery.\par \par S/P bariatric surgery = Following w/Dr. White\par \par C/O of hair loss that seemed to start after bariatric surgery.  Pt stated she was told by a dermatologist that it was due to surgery, and sudden change in her nutrition status.\par \par Chronic anemia due to heavy menstruation, and hx of sleep apnea, causing chronic fatigue.  No changes in sx at present.  Due for repeat labs.

## 2018-07-11 NOTE — REVIEW OF SYSTEMS
[Negative] : Heme/Lymph [Itching] : no itching [Mole Changes] : no mole changes [Nail Changes] : no nail changes [Skin Rash] : no skin rash [FreeTextEntry2] : losing wgt [de-identified] : hair loss [de-identified] : B/L foot neuropathy 2/2 DM

## 2018-07-11 NOTE — PHYSICAL EXAM
[No Acute Distress] : no acute distress [Well Nourished] : well nourished [Well Developed] : well developed [Well-Appearing] : well-appearing [Normal Voice/Communication] : normal voice/communication [Normal Sclera/Conjunctiva] : normal sclera/conjunctiva [EOMI] : extraocular movements intact [Normal Outer Ear/Nose] : the outer ears and nose were normal in appearance [No Respiratory Distress] : no respiratory distress  [Clear to Auscultation] : lungs were clear to auscultation bilaterally [No Accessory Muscle Use] : no accessory muscle use [Normal Rate] : normal rate  [Regular Rhythm] : with a regular rhythm [Normal S1, S2] : normal S1 and S2 [No Murmur] : no murmur heard [Soft] : abdomen soft [No Masses] : no abdominal mass palpated [Normal Bowel Sounds] : normal bowel sounds [No Joint Swelling] : no joint swelling [Grossly Normal Strength/Tone] : grossly normal strength/tone [No Rash] : no rash [Normal Gait] : normal gait [Coordination Grossly Intact] : coordination grossly intact [No Focal Deficits] : no focal deficits [Speech Grossly Normal] : speech grossly normal [Memory Grossly Normal] : memory grossly normal [Normal Affect] : the affect was normal [Alert and Oriented x3] : oriented to person, place, and time [Normal Mood] : the mood was normal [Normal Insight/Judgement] : insight and judgment were intact [de-identified] : obese [de-identified] : knees swollen.  b/L lower legs tender to palp 2/2 neuropathy

## 2018-07-11 NOTE — ASSESSMENT
[FreeTextEntry1] : 47 yr old female w/hx of morbid obesity, HTN, DM, hypothyroidism, cervical radiculopathy, diabetic neuropathy, Hx of thrombophilia (PE due to obesity and sedentary state), low back pain and chronic pain syndrome.  Pt here for F/U of ongoing issues and lab work.  On coumadin but at 5mg - need to check INR.  Exercising as much as tolerated (limited by DM neuropathic pain b/L feet).  Ambulatory dysfunction due to her chronic foot & leg pain, as well as neuropathy, make pt a fall risk.  HTN, HLD, IGT, Anemia/MARCIAL, thrombophilia all clinically stable.  INR therapeutic - to repeat in 3 weeks (will order home collection).  \par \par Counseled patient for greater than 50% of this visit, including diagnoses information, medication usage and side effects, therapeutic goals and options, and followup. Patient's questions were answered. Patient expressed understanding of, and agreement with, assessment and plan.

## 2018-07-16 PROBLEM — G62.9 POLYNEUROPATHY, UNSPECIFIED: Chronic | Status: INACTIVE | Noted: 2017-09-12 | Resolved: 2017-10-24

## 2018-07-16 PROBLEM — Z86.718 PERSONAL HISTORY OF OTHER VENOUS THROMBOSIS AND EMBOLISM: Chronic | Status: ACTIVE | Noted: 2017-10-24

## 2018-07-16 PROBLEM — E11.9 TYPE 2 DIABETES MELLITUS WITHOUT COMPLICATIONS: Chronic | Status: ACTIVE | Noted: 2017-09-12

## 2018-07-16 PROBLEM — N39.0 URINARY TRACT INFECTION, SITE NOT SPECIFIED: Chronic | Status: ACTIVE | Noted: 2017-09-12

## 2018-07-16 PROBLEM — Z86.711 PERSONAL HISTORY OF PULMONARY EMBOLISM: Chronic | Status: ACTIVE | Noted: 2017-10-24

## 2018-07-16 PROBLEM — E11.42 TYPE 2 DIABETES MELLITUS WITH DIABETIC POLYNEUROPATHY: Chronic | Status: ACTIVE | Noted: 2017-10-24

## 2018-07-16 PROBLEM — I82.409 ACUTE EMBOLISM AND THROMBOSIS OF UNSPECIFIED DEEP VEINS OF UNSPECIFIED LOWER EXTREMITY: Chronic | Status: INACTIVE | Noted: 2017-09-12 | Resolved: 2017-10-24

## 2018-07-16 PROBLEM — N92.0 EXCESSIVE AND FREQUENT MENSTRUATION WITH REGULAR CYCLE: Chronic | Status: ACTIVE | Noted: 2017-09-12

## 2018-07-16 PROBLEM — G47.33 OBSTRUCTIVE SLEEP APNEA (ADULT) (PEDIATRIC): Chronic | Status: ACTIVE | Noted: 2017-09-12

## 2018-07-16 PROBLEM — E66.01 MORBID (SEVERE) OBESITY DUE TO EXCESS CALORIES: Chronic | Status: ACTIVE | Noted: 2017-09-12

## 2018-07-17 ENCOUNTER — LABORATORY RESULT (OUTPATIENT)
Age: 48
End: 2018-07-17

## 2018-07-19 ENCOUNTER — MEDICATION RENEWAL (OUTPATIENT)
Age: 48
End: 2018-07-19

## 2018-07-25 ENCOUNTER — LABORATORY RESULT (OUTPATIENT)
Age: 48
End: 2018-07-25

## 2018-07-31 ENCOUNTER — MEDICATION RENEWAL (OUTPATIENT)
Age: 48
End: 2018-07-31

## 2018-08-02 ENCOUNTER — RX RENEWAL (OUTPATIENT)
Age: 48
End: 2018-08-02

## 2018-08-03 ENCOUNTER — LABORATORY RESULT (OUTPATIENT)
Age: 48
End: 2018-08-03

## 2018-08-13 ENCOUNTER — APPOINTMENT (OUTPATIENT)
Dept: SURGERY | Facility: CLINIC | Age: 48
End: 2018-08-13
Payer: MEDICARE

## 2018-08-13 VITALS
DIASTOLIC BLOOD PRESSURE: 85 MMHG | BODY MASS INDEX: 42.68 KG/M2 | WEIGHT: 250 LBS | OXYGEN SATURATION: 99 % | HEIGHT: 64 IN | TEMPERATURE: 98.6 F | HEART RATE: 96 BPM | RESPIRATION RATE: 29 BRPM | SYSTOLIC BLOOD PRESSURE: 121 MMHG

## 2018-08-13 PROBLEM — M19.90 UNSPECIFIED OSTEOARTHRITIS, UNSPECIFIED SITE: Chronic | Status: ACTIVE | Noted: 2017-09-12

## 2018-08-13 PROBLEM — E78.5 HYPERLIPIDEMIA, UNSPECIFIED: Chronic | Status: ACTIVE | Noted: 2017-09-12

## 2018-08-13 PROBLEM — D50.9 IRON DEFICIENCY ANEMIA, UNSPECIFIED: Chronic | Status: ACTIVE | Noted: 2017-10-24

## 2018-08-13 PROCEDURE — 99213 OFFICE O/P EST LOW 20 MIN: CPT

## 2018-08-15 ENCOUNTER — RX RENEWAL (OUTPATIENT)
Age: 48
End: 2018-08-15

## 2018-08-16 ENCOUNTER — LABORATORY RESULT (OUTPATIENT)
Age: 48
End: 2018-08-16

## 2018-08-20 ENCOUNTER — APPOINTMENT (OUTPATIENT)
Dept: BARIATRICS/WEIGHT MGMT | Facility: CLINIC | Age: 48
End: 2018-08-20
Payer: MEDICARE

## 2018-08-20 VITALS
WEIGHT: 249.12 LBS | HEIGHT: 64 IN | SYSTOLIC BLOOD PRESSURE: 110 MMHG | BODY MASS INDEX: 42.53 KG/M2 | DIASTOLIC BLOOD PRESSURE: 78 MMHG

## 2018-08-20 PROCEDURE — 99214 OFFICE O/P EST MOD 30 MIN: CPT

## 2018-08-20 RX ORDER — LANCETS
EACH MISCELLANEOUS
Qty: 1 | Refills: 2 | Status: ACTIVE | COMMUNITY
Start: 2018-08-15 | End: 1900-01-01

## 2018-08-23 ENCOUNTER — LABORATORY RESULT (OUTPATIENT)
Age: 48
End: 2018-08-23

## 2018-09-06 ENCOUNTER — LABORATORY RESULT (OUTPATIENT)
Age: 48
End: 2018-09-06

## 2018-09-19 ENCOUNTER — MEDICATION RENEWAL (OUTPATIENT)
Age: 48
End: 2018-09-19

## 2018-09-22 ENCOUNTER — RX RENEWAL (OUTPATIENT)
Age: 48
End: 2018-09-22

## 2018-10-05 ENCOUNTER — LABORATORY RESULT (OUTPATIENT)
Age: 48
End: 2018-10-05

## 2018-10-07 ENCOUNTER — RX RENEWAL (OUTPATIENT)
Age: 48
End: 2018-10-07

## 2018-10-22 ENCOUNTER — APPOINTMENT (OUTPATIENT)
Dept: BARIATRICS/WEIGHT MGMT | Facility: CLINIC | Age: 48
End: 2018-10-22
Payer: MEDICARE

## 2018-10-22 VITALS
HEART RATE: 94 BPM | OXYGEN SATURATION: 97 % | BODY MASS INDEX: 40.29 KG/M2 | DIASTOLIC BLOOD PRESSURE: 68 MMHG | SYSTOLIC BLOOD PRESSURE: 106 MMHG | WEIGHT: 236 LBS | HEIGHT: 64 IN

## 2018-10-22 PROCEDURE — 99214 OFFICE O/P EST MOD 30 MIN: CPT

## 2018-10-23 ENCOUNTER — RX RENEWAL (OUTPATIENT)
Age: 48
End: 2018-10-23

## 2018-10-25 ENCOUNTER — MEDICATION RENEWAL (OUTPATIENT)
Age: 48
End: 2018-10-25

## 2018-10-31 ENCOUNTER — LABORATORY RESULT (OUTPATIENT)
Age: 48
End: 2018-10-31

## 2018-11-12 ENCOUNTER — APPOINTMENT (OUTPATIENT)
Dept: INTERNAL MEDICINE | Facility: CLINIC | Age: 48
End: 2018-11-12
Payer: MEDICARE

## 2018-11-12 VITALS
HEIGHT: 64 IN | HEART RATE: 103 BPM | TEMPERATURE: 98.7 F | WEIGHT: 238 LBS | OXYGEN SATURATION: 95 % | BODY MASS INDEX: 40.63 KG/M2 | RESPIRATION RATE: 12 BRPM | DIASTOLIC BLOOD PRESSURE: 85 MMHG | SYSTOLIC BLOOD PRESSURE: 132 MMHG

## 2018-11-12 PROCEDURE — 99214 OFFICE O/P EST MOD 30 MIN: CPT

## 2018-11-18 NOTE — PHYSICAL EXAM
[No Acute Distress] : no acute distress [Well Nourished] : well nourished [Well Developed] : well developed [Well-Appearing] : well-appearing [Normal Voice/Communication] : normal voice/communication [Normal Sclera/Conjunctiva] : normal sclera/conjunctiva [EOMI] : extraocular movements intact [Normal Outer Ear/Nose] : the outer ears and nose were normal in appearance [No Respiratory Distress] : no respiratory distress  [Clear to Auscultation] : lungs were clear to auscultation bilaterally [No Accessory Muscle Use] : no accessory muscle use [Normal Rate] : normal rate  [Regular Rhythm] : with a regular rhythm [Normal S1, S2] : normal S1 and S2 [No Murmur] : no murmur heard [Soft] : abdomen soft [No Masses] : no abdominal mass palpated [Normal Bowel Sounds] : normal bowel sounds [No Joint Swelling] : no joint swelling [Grossly Normal Strength/Tone] : grossly normal strength/tone [No Rash] : no rash [Normal Gait] : normal gait [Coordination Grossly Intact] : coordination grossly intact [No Focal Deficits] : no focal deficits [Speech Grossly Normal] : speech grossly normal [Memory Grossly Normal] : memory grossly normal [Normal Affect] : the affect was normal [Alert and Oriented x3] : oriented to person, place, and time [Normal Mood] : the mood was normal [Normal Insight/Judgement] : insight and judgment were intact [de-identified] : obese [de-identified] : knees swollen.  b/L lower legs tender to palp 2/2 neuropathy

## 2018-11-18 NOTE — HISTORY OF PRESENT ILLNESS
[de-identified] : 49 yo female, with PMHx of morbid obesity,S/P gastric sleeve surgery Nov 2017-has lost about 190 lbs),  DM type II, hypothyroidism, chronic peripheral neuropathy, severe YVONNE, PE X 2 (VTE) on chronic coumadin, presents for follow up visit and INR check\par She is on coumadin 5mg daily.  Denies any cramping or pain of calves, chest pain, MOREIRA, easy bruising, HA, weakness, epistaxis, hemoptysis, hematuria, melena or hematochezia.\par still has ambulatory dysfunction secondary to DM neuropathy and B/L arthritis of knees and feet-using rolling walker.  \par She is on duloxetine 60mg, and Lyrica 75mg TID. Also takes percocet 5/325 BID\par \par

## 2018-11-18 NOTE — ASSESSMENT
[FreeTextEntry1] : Hx of diabetic neuropathy, ambulatory dysfunction\par cont current meds\par \par Hx of PE X 2 \par cont coumadin 5mg\par INR checked today\par \par referred for mammography-last mammo was Aug 2016\par \par referred to vascular

## 2018-11-18 NOTE — REVIEW OF SYSTEMS
[Negative] : Heme/Lymph [Itching] : no itching [Mole Changes] : no mole changes [Nail Changes] : no nail changes [Skin Rash] : no skin rash [de-identified] : hair loss [de-identified] : B/L foot neuropathy 2/2 DM

## 2018-11-19 ENCOUNTER — APPOINTMENT (OUTPATIENT)
Dept: SURGERY | Facility: CLINIC | Age: 48
End: 2018-11-19
Payer: MEDICARE

## 2018-11-19 LAB
INR PPP: 1.84 RATIO
PT BLD: 21.4 SEC

## 2018-11-20 ENCOUNTER — LABORATORY RESULT (OUTPATIENT)
Age: 48
End: 2018-11-20

## 2018-11-26 ENCOUNTER — APPOINTMENT (OUTPATIENT)
Dept: SURGERY | Facility: CLINIC | Age: 48
End: 2018-11-26
Payer: MEDICARE

## 2018-11-26 VITALS
TEMPERATURE: 98.7 F | BODY MASS INDEX: 40.04 KG/M2 | DIASTOLIC BLOOD PRESSURE: 77 MMHG | HEART RATE: 79 BPM | HEIGHT: 64 IN | WEIGHT: 234.5 LBS | OXYGEN SATURATION: 98 % | RESPIRATION RATE: 16 BRPM | SYSTOLIC BLOOD PRESSURE: 122 MMHG

## 2018-11-26 DIAGNOSIS — N63.10 UNSPECIFIED LUMP IN THE RIGHT BREAST, UNSPECIFIED QUADRANT: ICD-10-CM

## 2018-11-26 PROCEDURE — 99214 OFFICE O/P EST MOD 30 MIN: CPT

## 2018-11-26 RX ORDER — GLUC/MSM/COLGN2/HYAL/ANTIARTH3 375-375-20
TABLET ORAL
Refills: 0 | Status: DISCONTINUED | COMMUNITY
End: 2018-11-26

## 2018-11-26 RX ORDER — MAGNESIUM 200 MG
1000 TABLET ORAL DAILY
Qty: 30 | Refills: 1 | Status: DISCONTINUED | COMMUNITY
Start: 2018-05-15 | End: 2018-11-26

## 2018-11-26 RX ORDER — DICLOFENAC EPOLAMINE 0.01 G/1
1.3 SYSTEM TOPICAL TWICE DAILY
Qty: 60 | Refills: 5 | Status: DISCONTINUED | COMMUNITY
Start: 2018-05-15 | End: 2018-11-26

## 2018-11-26 RX ORDER — DICLOFENAC SODIUM 10 MG/G
1 GEL TOPICAL
Qty: 1 | Refills: 0 | Status: DISCONTINUED | COMMUNITY
Start: 2018-03-14 | End: 2018-11-26

## 2018-11-26 RX ORDER — WARFARIN 1 MG/1
1 TABLET ORAL DAILY
Qty: 1 | Refills: 0 | Status: DISCONTINUED | COMMUNITY
Start: 2018-06-20 | End: 2018-11-26

## 2018-11-28 ENCOUNTER — LABORATORY RESULT (OUTPATIENT)
Age: 48
End: 2018-11-28

## 2018-12-03 ENCOUNTER — APPOINTMENT (OUTPATIENT)
Dept: BARIATRICS/WEIGHT MGMT | Facility: CLINIC | Age: 48
End: 2018-12-03
Payer: MEDICARE

## 2018-12-03 VITALS
OXYGEN SATURATION: 95 % | BODY MASS INDEX: 39.61 KG/M2 | WEIGHT: 232 LBS | DIASTOLIC BLOOD PRESSURE: 74 MMHG | HEIGHT: 64 IN | SYSTOLIC BLOOD PRESSURE: 112 MMHG | HEART RATE: 84 BPM

## 2018-12-03 PROCEDURE — 99214 OFFICE O/P EST MOD 30 MIN: CPT

## 2018-12-04 ENCOUNTER — MEDICATION RENEWAL (OUTPATIENT)
Age: 48
End: 2018-12-04

## 2018-12-12 ENCOUNTER — LABORATORY RESULT (OUTPATIENT)
Age: 48
End: 2018-12-12

## 2018-12-26 ENCOUNTER — LABORATORY RESULT (OUTPATIENT)
Age: 48
End: 2018-12-26

## 2018-12-26 ENCOUNTER — MEDICATION RENEWAL (OUTPATIENT)
Age: 48
End: 2018-12-26

## 2018-12-26 NOTE — PHYSICAL THERAPY INITIAL EVALUATION ADULT - WEIGHT-BEARING RESTRICTIONS: SIT/STAND, REHAB EVAL
Patient was started on the Mirapex. She states it makes her restless leg worse. She has a blistery rast on her face. full weight-bearing

## 2018-12-31 ENCOUNTER — MEDICATION RENEWAL (OUTPATIENT)
Age: 48
End: 2018-12-31

## 2019-01-04 ENCOUNTER — MEDICATION RENEWAL (OUTPATIENT)
Age: 49
End: 2019-01-04

## 2019-01-07 ENCOUNTER — RX RENEWAL (OUTPATIENT)
Age: 49
End: 2019-01-07

## 2019-01-23 ENCOUNTER — LABORATORY RESULT (OUTPATIENT)
Age: 49
End: 2019-01-23

## 2019-01-28 ENCOUNTER — APPOINTMENT (OUTPATIENT)
Dept: BARIATRICS/WEIGHT MGMT | Facility: CLINIC | Age: 49
End: 2019-01-28
Payer: MEDICARE

## 2019-01-28 VITALS
HEIGHT: 64 IN | WEIGHT: 236 LBS | DIASTOLIC BLOOD PRESSURE: 64 MMHG | OXYGEN SATURATION: 96 % | HEART RATE: 96 BPM | BODY MASS INDEX: 40.29 KG/M2 | SYSTOLIC BLOOD PRESSURE: 108 MMHG

## 2019-01-28 PROCEDURE — 99214 OFFICE O/P EST MOD 30 MIN: CPT

## 2019-01-30 ENCOUNTER — RX CHANGE (OUTPATIENT)
Age: 49
End: 2019-01-30

## 2019-01-30 ENCOUNTER — RX RENEWAL (OUTPATIENT)
Age: 49
End: 2019-01-30

## 2019-01-30 NOTE — HISTORY OF PRESENT ILLNESS
[FreeTextEntry1] : 47 yo F s/p sleeve now w/ bmi of 40 after 193lb weight loss presents for weight management f/u.\par \par Pt has plateaued in weight over the last four months.  She reports no major dietary changes other than adding a boiled egg to her yogurt for breakfast in the morning to increase protein.  She is eating w/in a 12hr window.\romulo Gets 6-7hr sleep no longer requiring CPAP after weight loss.\romulo Uses seated stepper daily for one hour and stationary bike for 10-15min 2-3x per week.  Her activity is limited by peripheral neuropathy causing pain and balance issues.\par Conts to tolerate Bupropion 300mg for weight loss, Lyrica for neuropathy, Synthrod 137mcg for hypothyroidism.\par A1c and TFT's unremarkable.

## 2019-01-30 NOTE — ASSESSMENT
[FreeTextEntry1] : 47 yo F s/p sleeve now w/ bmi of 40 after 193lb weight loss presents for weight management f/u.\par \par Plan:\par Increase frequency and time on stationary back for prolonged increase in HR.\par Due to weight plateau and pt's regulation of HRN will discontinue Bupropion and start Phentermine 15mg daily\par Replace pudding snack w/ fiber containing foods ie fruits and vegetables\par \par RTC 4wk

## 2019-01-30 NOTE — PHYSICAL EXAM
[Obese] : obese [Normal] : RRR, normal S1S2, no murmurs, rubs, gallops [de-identified] : central adiposity [de-identified] : walking w/ use of rollator

## 2019-02-01 ENCOUNTER — LABORATORY RESULT (OUTPATIENT)
Age: 49
End: 2019-02-01

## 2019-02-13 ENCOUNTER — LABORATORY RESULT (OUTPATIENT)
Age: 49
End: 2019-02-13

## 2019-02-19 ENCOUNTER — CLINICAL ADVICE (OUTPATIENT)
Age: 49
End: 2019-02-19

## 2019-02-20 ENCOUNTER — LABORATORY RESULT (OUTPATIENT)
Age: 49
End: 2019-02-20

## 2019-03-04 ENCOUNTER — APPOINTMENT (OUTPATIENT)
Dept: BARIATRICS/WEIGHT MGMT | Facility: CLINIC | Age: 49
End: 2019-03-04
Payer: MEDICARE

## 2019-03-04 VITALS
DIASTOLIC BLOOD PRESSURE: 90 MMHG | HEART RATE: 96 BPM | SYSTOLIC BLOOD PRESSURE: 140 MMHG | HEIGHT: 64 IN | WEIGHT: 233.02 LBS | OXYGEN SATURATION: 96 % | BODY MASS INDEX: 39.78 KG/M2

## 2019-03-04 PROCEDURE — 99214 OFFICE O/P EST MOD 30 MIN: CPT

## 2019-03-04 NOTE — ASSESSMENT
[FreeTextEntry1] : 47 yo F s/p sleeve now w/ bmi of 40 weight loss presents for weight management f/u.\par Pt w/ 3lb weight loss since prior visit, total of 197lb since prior to sleeve.\par \par Plan:\par Cont to increase length of time on stationary bike up to 20-30min daily\par Cont Phentermine 15mg, refill today\par Replace pudding snack w/ fiber containing items, particularly vegetables\par \par RTC 4wk

## 2019-03-04 NOTE — HISTORY OF PRESENT ILLNESS
[FreeTextEntry1] : 49 yo F s/p sleeve now w/ bmi of 40 weight loss presents for weight management f/u.\par Pt w/ 3lb weight loss since prior visit, total of 197lb since prior to sleeve.\par \par Started Phentermine 15mg, needing refill today.  Slight palpitations first day, now resolved.  Bupropion d/c'ed.\par Increased exercise to stationary bike 15min daily from 3x per wk.  Still using seated stepper daily which she feels helps w/ neuropathy.\par No change in sleep and diet.  Still eating pudding, minimal vegetables.

## 2019-03-04 NOTE — PHYSICAL EXAM
[Obese] : obese [Normal] : RRR, normal S1S2, no murmurs, rubs, gallops [de-identified] : central adiposity [de-identified] : walking w/ use of rollator

## 2019-03-06 ENCOUNTER — RX RENEWAL (OUTPATIENT)
Age: 49
End: 2019-03-06

## 2019-03-06 ENCOUNTER — LABORATORY RESULT (OUTPATIENT)
Age: 49
End: 2019-03-06

## 2019-03-06 ENCOUNTER — MEDICATION RENEWAL (OUTPATIENT)
Age: 49
End: 2019-03-06

## 2019-03-11 ENCOUNTER — RX RENEWAL (OUTPATIENT)
Age: 49
End: 2019-03-11

## 2019-03-20 ENCOUNTER — LABORATORY RESULT (OUTPATIENT)
Age: 49
End: 2019-03-20

## 2019-03-26 NOTE — PHYSICAL THERAPY INITIAL EVALUATION ADULT - HEALTH SCREEN CRITERIA
Daily Note     Today's date: 3/26/2019  Patient name: Waleska Noble  : 1975  MRN: [id number]  Referring provider: Kishor Gutierrez 
yes

## 2019-03-27 ENCOUNTER — APPOINTMENT (OUTPATIENT)
Dept: INTERNAL MEDICINE | Facility: CLINIC | Age: 49
End: 2019-03-27
Payer: MEDICARE

## 2019-03-27 VITALS
TEMPERATURE: 98.6 F | WEIGHT: 233 LBS | HEART RATE: 82 BPM | DIASTOLIC BLOOD PRESSURE: 77 MMHG | HEIGHT: 64 IN | RESPIRATION RATE: 12 BRPM | BODY MASS INDEX: 39.78 KG/M2 | OXYGEN SATURATION: 97 % | SYSTOLIC BLOOD PRESSURE: 128 MMHG

## 2019-03-27 DIAGNOSIS — L03.90 CELLULITIS, UNSPECIFIED: ICD-10-CM

## 2019-03-27 PROCEDURE — 99214 OFFICE O/P EST MOD 30 MIN: CPT

## 2019-04-03 ENCOUNTER — LABORATORY RESULT (OUTPATIENT)
Age: 49
End: 2019-04-03

## 2019-04-03 ENCOUNTER — RX RENEWAL (OUTPATIENT)
Age: 49
End: 2019-04-03

## 2019-04-05 ENCOUNTER — RX RENEWAL (OUTPATIENT)
Age: 49
End: 2019-04-05

## 2019-04-08 NOTE — HISTORY OF PRESENT ILLNESS
[de-identified] : 47 yo female, with PMHx of morbid obesity,S/P gastric sleeve surgery Nov 2017-has lost about 190 lbs),  DM type II, hypothyroidism, chronic peripheral neuropathy, severe YVONNE, PE X 2 (VTE) on chronic coumadin, presents for follow up visi\par She is on coumadin 5mg, alternating with 7.5mg \par still has ambulatory dysfunction secondary to DM neuropathy and B/L arthritis of knees and feet-using rolling walker  and cane\par She c/o redness,swelling in her left lower leg. \par She went out walking with her sister 2 days ago and yesterday she noticed the leg being swollen and and a little red. She kept it up all day yesterday and it was a little better. Today however the leg is more red and swollen and it's hot to the touch\par She is on duloxetine 60mg, and Lyrica 75mg TID. Also takes percocet 5/325 BID\par \par

## 2019-04-08 NOTE — PHYSICAL EXAM
[No Acute Distress] : no acute distress [Well Nourished] : well nourished [Well Developed] : well developed [Well-Appearing] : well-appearing [Normal Voice/Communication] : normal voice/communication [Normal Sclera/Conjunctiva] : normal sclera/conjunctiva [EOMI] : extraocular movements intact [Normal Outer Ear/Nose] : the outer ears and nose were normal in appearance [No Respiratory Distress] : no respiratory distress  [Clear to Auscultation] : lungs were clear to auscultation bilaterally [No Accessory Muscle Use] : no accessory muscle use [Normal Rate] : normal rate  [Regular Rhythm] : with a regular rhythm [Normal S1, S2] : normal S1 and S2 [No Murmur] : no murmur heard [Soft] : abdomen soft [No Masses] : no abdominal mass palpated [Normal Bowel Sounds] : normal bowel sounds [No Joint Swelling] : no joint swelling [Grossly Normal Strength/Tone] : grossly normal strength/tone [Normal Gait] : normal gait [Coordination Grossly Intact] : coordination grossly intact [No Focal Deficits] : no focal deficits [Speech Grossly Normal] : speech grossly normal [Memory Grossly Normal] : memory grossly normal [Normal Affect] : the affect was normal [Alert and Oriented x3] : oriented to person, place, and time [Normal Mood] : the mood was normal [Normal Insight/Judgement] : insight and judgment were intact [de-identified] : obese [de-identified] : knees swollen.  b/L lower legs tender to palp 2/2 neuropathy [de-identified] : + erythema, warm to touch and swelling left lower leg, ankle to top and medial aspect of foot

## 2019-04-08 NOTE — ASSESSMENT
[FreeTextEntry1] : cellulitis\par augmentin 875mg po BID with food\par keep leg elevated\par area of redness marked-Pt instructed if erythema goes beyond the boarders of the penm to go to the ER

## 2019-04-15 ENCOUNTER — MEDICATION RENEWAL (OUTPATIENT)
Age: 49
End: 2019-04-15

## 2019-04-17 ENCOUNTER — LABORATORY RESULT (OUTPATIENT)
Age: 49
End: 2019-04-17

## 2019-04-18 ENCOUNTER — APPOINTMENT (OUTPATIENT)
Dept: BARIATRICS/WEIGHT MGMT | Facility: CLINIC | Age: 49
End: 2019-04-18
Payer: MEDICARE

## 2019-04-18 VITALS
HEART RATE: 86 BPM | BODY MASS INDEX: 39.57 KG/M2 | WEIGHT: 231.8 LBS | OXYGEN SATURATION: 96 % | SYSTOLIC BLOOD PRESSURE: 112 MMHG | HEIGHT: 64 IN | DIASTOLIC BLOOD PRESSURE: 64 MMHG

## 2019-04-18 PROCEDURE — 99214 OFFICE O/P EST MOD 30 MIN: CPT

## 2019-04-18 RX ORDER — AMOXICILLIN AND CLAVULANATE POTASSIUM 875; 125 MG/1; MG/1
875-125 TABLET, COATED ORAL
Qty: 20 | Refills: 0 | Status: DISCONTINUED | COMMUNITY
Start: 2019-03-27 | End: 2019-04-18

## 2019-04-18 NOTE — PHYSICAL EXAM
[Obese, well nourished, in no acute distress] : obese, well nourished, in no acute distress [Normal] : clear to auscultation bilaterally, no dullness, no wheezing)

## 2019-04-19 NOTE — ASSESSMENT
[FreeTextEntry1] : Plan: \par \par Continue whole food. \par Increase exercise, 2-15 minutes bike rides\par Increase phentermine to 30mg daily \par \par RTO 3-4 weeks

## 2019-04-19 NOTE — HISTORY OF PRESENT ILLNESS
[FreeTextEntry1] : This is a 48 year old female present in office today for weight management followup. \par \par Patient has lost 2 pounds since last visit. She has been on phentermine 15mg daily, tolerating well. She continues to use her bike 15 minutes daily. Biking causes increase numbness to feet, but quickly resolves when she stops. Her food intake remains the same. Breakfast yogurt and boil egg, lunch tuna and beans, dinner vegetables, and will snack on pudding in between meals.

## 2019-04-22 ENCOUNTER — APPOINTMENT (OUTPATIENT)
Dept: INTERNAL MEDICINE | Facility: CLINIC | Age: 49
End: 2019-04-22
Payer: MEDICARE

## 2019-04-22 VITALS
DIASTOLIC BLOOD PRESSURE: 78 MMHG | RESPIRATION RATE: 12 BRPM | HEIGHT: 64 IN | BODY MASS INDEX: 39.44 KG/M2 | SYSTOLIC BLOOD PRESSURE: 138 MMHG | HEART RATE: 75 BPM | WEIGHT: 231 LBS | TEMPERATURE: 98.3 F | OXYGEN SATURATION: 97 %

## 2019-04-22 DIAGNOSIS — K59.00 CONSTIPATION, UNSPECIFIED: ICD-10-CM

## 2019-04-22 DIAGNOSIS — K62.5 HEMORRHAGE OF ANUS AND RECTUM: ICD-10-CM

## 2019-04-22 PROCEDURE — 99214 OFFICE O/P EST MOD 30 MIN: CPT

## 2019-04-30 NOTE — HISTORY OF PRESENT ILLNESS
[FreeTextEntry8] : 47 yo female, with PMHx of morbid obesity,S/P gastric sleeve surgery Nov 2017-has lost about 190 lbs),  DM type II, hypothyroidism, chronic peripheral neuropathy, severe YVONNE, PE X 2 (VTE) on chronic coumadin, presents stating that about a week and a half ago, after she had a bowel movement, she had some blood on the tissue when she wiped. Two days later she had "little dots of blood" on the tissue paper. She has not seen any blood since then. Says she is a little constipated normally and says the first time she had the blood in the tissue she felt a little "stinging in the back" \par Denies abdominal pain

## 2019-04-30 NOTE — REVIEW OF SYSTEMS
[Negative] : Heme/Lymph [FreeTextEntry7] : see HPI [de-identified] : see HPI [de-identified] : B/L foot neuropathy 2/2 DM

## 2019-04-30 NOTE — PHYSICAL EXAM
[Well Nourished] : well nourished [No Acute Distress] : no acute distress [Well Developed] : well developed [Well-Appearing] : well-appearing [Normal Voice/Communication] : normal voice/communication [Normal Sclera/Conjunctiva] : normal sclera/conjunctiva [No Respiratory Distress] : no respiratory distress  [EOMI] : extraocular movements intact [Normal Outer Ear/Nose] : the outer ears and nose were normal in appearance [Clear to Auscultation] : lungs were clear to auscultation bilaterally [No Accessory Muscle Use] : no accessory muscle use [Regular Rhythm] : with a regular rhythm [Normal Rate] : normal rate  [Normal S1, S2] : normal S1 and S2 [No Murmur] : no murmur heard [No Masses] : no abdominal mass palpated [Soft] : abdomen soft [Normal Bowel Sounds] : normal bowel sounds [No Joint Swelling] : no joint swelling [Grossly Normal Strength/Tone] : grossly normal strength/tone [Normal Gait] : normal gait [Speech Grossly Normal] : speech grossly normal [Memory Grossly Normal] : memory grossly normal [Normal Mood] : the mood was normal [Alert and Oriented x3] : oriented to person, place, and time [Normal Affect] : the affect was normal [Normal Insight/Judgement] : insight and judgment were intact [No Focal Deficits] : no focal deficits [de-identified] : obese [de-identified] : b/L lower legs tender to palp 2/2 neuropathy

## 2019-05-01 ENCOUNTER — LABORATORY RESULT (OUTPATIENT)
Age: 49
End: 2019-05-01

## 2019-05-01 ENCOUNTER — MEDICATION RENEWAL (OUTPATIENT)
Age: 49
End: 2019-05-01

## 2019-05-14 ENCOUNTER — APPOINTMENT (OUTPATIENT)
Dept: SURGERY | Facility: CLINIC | Age: 49
End: 2019-05-14
Payer: MEDICARE

## 2019-05-14 VITALS
DIASTOLIC BLOOD PRESSURE: 82 MMHG | SYSTOLIC BLOOD PRESSURE: 129 MMHG | HEART RATE: 78 BPM | HEIGHT: 64 IN | RESPIRATION RATE: 15 BRPM | WEIGHT: 229 LBS | TEMPERATURE: 98.3 F | BODY MASS INDEX: 39.09 KG/M2 | OXYGEN SATURATION: 96 %

## 2019-05-14 DIAGNOSIS — E53.8 DEFICIENCY OF OTHER SPECIFIED B GROUP VITAMINS: ICD-10-CM

## 2019-05-14 PROCEDURE — 99213 OFFICE O/P EST LOW 20 MIN: CPT

## 2019-05-16 ENCOUNTER — LABORATORY RESULT (OUTPATIENT)
Age: 49
End: 2019-05-16

## 2019-05-29 ENCOUNTER — LABORATORY RESULT (OUTPATIENT)
Age: 49
End: 2019-05-29

## 2019-05-31 ENCOUNTER — RX RENEWAL (OUTPATIENT)
Age: 49
End: 2019-05-31

## 2019-06-03 ENCOUNTER — MEDICATION RENEWAL (OUTPATIENT)
Age: 49
End: 2019-06-03

## 2019-06-03 ENCOUNTER — RX RENEWAL (OUTPATIENT)
Age: 49
End: 2019-06-03

## 2019-06-04 ENCOUNTER — RX RENEWAL (OUTPATIENT)
Age: 49
End: 2019-06-04

## 2019-06-19 ENCOUNTER — LABORATORY RESULT (OUTPATIENT)
Age: 49
End: 2019-06-19

## 2019-07-25 ENCOUNTER — RX RENEWAL (OUTPATIENT)
Age: 49
End: 2019-07-25

## 2019-07-25 ENCOUNTER — APPOINTMENT (OUTPATIENT)
Dept: BARIATRICS/WEIGHT MGMT | Facility: CLINIC | Age: 49
End: 2019-07-25
Payer: MEDICARE

## 2019-07-25 VITALS
HEART RATE: 81 BPM | BODY MASS INDEX: 39.16 KG/M2 | DIASTOLIC BLOOD PRESSURE: 76 MMHG | WEIGHT: 229.4 LBS | HEIGHT: 64 IN | OXYGEN SATURATION: 97 % | SYSTOLIC BLOOD PRESSURE: 122 MMHG

## 2019-07-25 PROCEDURE — 99214 OFFICE O/P EST MOD 30 MIN: CPT

## 2019-07-26 NOTE — ASSESSMENT
[FreeTextEntry1] : Encouraged patient to return to whole foods diet.\par \par Prescribe Wellbutrin 150mg XL and increase to 300mg as tolerated\par \par additional time spent with RD to discuss food transition\par \par RTC in 2 months to recheck blood work\par \par

## 2019-07-26 NOTE — HISTORY OF PRESENT ILLNESS
[FreeTextEntry1] : 47 yo female, with PMHx of morbid obesity,S/P gastric sleeve surgery Nov 2017- DM type II, hypothyroidism, chronic peripheral neuropathy, severe YVONNE, PE X 2 (VTE) on chronic coumadin presents for a follow up visit. Has not been on track with diet /exercise due to father passing away recently. Also off of phentermine the past month.   ? of depressed mood during this time\par \par B: 1 egg\par L: tuna and vegetables\par D: cottage cheese\par \par Snacks on pudding, minimal exercise due to neuropathy\par \par

## 2019-07-26 NOTE — PHYSICAL EXAM
[Obese] : obese [Normal] : clear to auscultation bilaterally, no dullness, no wheezing) [de-identified] : soft holosystolic murmur  [de-identified] : venous stasis changes b/l LE, varicose veins

## 2019-08-05 ENCOUNTER — APPOINTMENT (OUTPATIENT)
Dept: CARDIOLOGY | Facility: CLINIC | Age: 49
End: 2019-08-05
Payer: MEDICARE

## 2019-08-05 ENCOUNTER — NON-APPOINTMENT (OUTPATIENT)
Age: 49
End: 2019-08-05

## 2019-08-05 VITALS
OXYGEN SATURATION: 96 % | SYSTOLIC BLOOD PRESSURE: 116 MMHG | HEART RATE: 82 BPM | BODY MASS INDEX: 39.09 KG/M2 | RESPIRATION RATE: 14 BRPM | HEIGHT: 64 IN | TEMPERATURE: 98.7 F | DIASTOLIC BLOOD PRESSURE: 73 MMHG | WEIGHT: 229 LBS

## 2019-08-05 DIAGNOSIS — R01.1 CARDIAC MURMUR, UNSPECIFIED: ICD-10-CM

## 2019-08-05 PROCEDURE — 99215 OFFICE O/P EST HI 40 MIN: CPT | Mod: 25

## 2019-08-05 PROCEDURE — 93000 ELECTROCARDIOGRAM COMPLETE: CPT

## 2019-08-05 NOTE — DISCUSSION/SUMMARY
[FreeTextEntry1] : The patient is a 48-year-old morbidly obese female history of hypertension, hypothyroidism, diabetes mellitus,  recurrent pulmonary emboli, diastolic heart failure s/p gastric sleeve with murmur most likely flow murmur.\par #1 CV- diastolic heart failure,  echo ordered\par #2 Htn- off medications\par #3 Lipids- on atorvastatin\par #4 PE- recurrent broke through Xarelto, warfarin dosing, INR 2.1 today\par #5 Hypothyroid- on levothyroxine\par #6 DM- off medication, weight currently stabilized, works with nutritionist

## 2019-08-05 NOTE — REVIEW OF SYSTEMS
[Recent Weight Loss (___ Lbs)] : recent [unfilled] ~Ulb weight loss [Shortness Of Breath] : shortness of breath [Chest Pain] : no chest pain [Dyspnea on exertion] : dyspnea during exertion [Palpitations] : no palpitations [Lower Ext Edema] : no extremity edema [Negative] : Heme/Lymph

## 2019-08-05 NOTE — PHYSICAL EXAM
[General Appearance - Well Developed] : well developed [Normal Appearance] : normal appearance [Well Groomed] : well groomed [General Appearance - Well Nourished] : well nourished [General Appearance - In No Acute Distress] : no acute distress [No Deformities] : no deformities [Normal Conjunctiva] : the conjunctiva exhibited no abnormalities [Eyelids - No Xanthelasma] : the eyelids demonstrated no xanthelasmas [Normal Oral Mucosa] : normal oral mucosa [No Oral Pallor] : no oral pallor [No Oral Cyanosis] : no oral cyanosis [Normal Jugular Venous A Waves Present] : normal jugular venous A waves present [No Jugular Venous Carreno A Waves] : no jugular venous carreno A waves [Normal Jugular Venous V Waves Present] : normal jugular venous V waves present [Respiration, Rhythm And Depth] : normal respiratory rhythm and effort [Exaggerated Use Of Accessory Muscles For Inspiration] : no accessory muscle use [Heart Sounds] : normal S1 and S2 [Auscultation Breath Sounds / Voice Sounds] : lungs were clear to auscultation bilaterally [Tachycardic ___] : the heart rate was tachycardic at [unfilled] bpm [Murmurs] : no murmurs present [Abdomen Soft] : soft [Abdomen Tenderness] : non-tender [Abdomen Mass (___ Cm)] : no abdominal mass palpated [Abnormal Walk] : normal gait [Gait - Sufficient For Exercise Testing] : the gait was sufficient for exercise testing [Petechial Hemorrhages (___cm)] : no petechial hemorrhages [Nail Clubbing] : no clubbing of the fingernails [Cyanosis, Localized] : no localized cyanosis [Skin Color & Pigmentation] : normal skin color and pigmentation [No Venous Stasis] : no venous stasis [] : no rash [Skin Lesions] : no skin lesions [No Xanthoma] : no  xanthoma was observed [No Skin Ulcers] : no skin ulcer [Affect] : the affect was normal [Oriented To Time, Place, And Person] : oriented to person, place, and time [No Anxiety] : not feeling anxious [Mood] : the mood was normal

## 2019-08-05 NOTE — HISTORY OF PRESENT ILLNESS
[FreeTextEntry1] : Ekaterini is s/p gastric sleeve. She was told of recent onset of murmur but heart rate is slower than it was in the past.  She denies any chest pain, palpitations but does get short of breath on exertion.

## 2019-08-12 ENCOUNTER — APPOINTMENT (OUTPATIENT)
Dept: CARDIOLOGY | Facility: CLINIC | Age: 49
End: 2019-08-12
Payer: MEDICARE

## 2019-08-12 ENCOUNTER — APPOINTMENT (OUTPATIENT)
Dept: INTERNAL MEDICINE | Facility: CLINIC | Age: 49
End: 2019-08-12
Payer: MEDICARE

## 2019-08-12 VITALS
HEART RATE: 70 BPM | HEIGHT: 64 IN | RESPIRATION RATE: 12 BRPM | WEIGHT: 234 LBS | SYSTOLIC BLOOD PRESSURE: 120 MMHG | TEMPERATURE: 99.1 F | BODY MASS INDEX: 39.95 KG/M2 | OXYGEN SATURATION: 97 % | DIASTOLIC BLOOD PRESSURE: 76 MMHG

## 2019-08-12 PROCEDURE — 99214 OFFICE O/P EST MOD 30 MIN: CPT

## 2019-08-12 PROCEDURE — 93306 TTE W/DOPPLER COMPLETE: CPT

## 2019-08-14 NOTE — REVIEW OF SYSTEMS
[Negative] : Psychiatric [FreeTextEntry9] : ambulatory dysfunction [de-identified] : B/L foot neuropathy 2/2 DM

## 2019-08-14 NOTE — HISTORY OF PRESENT ILLNESS
[de-identified] : 50 yo female, with PMHx of morbid obesity,S/P gastric sleeve surgery Nov 2017-has lost about 190 lbs),  DM type II, hypothyroidism, chronic peripheral neuropathy, severe YVONNE, PE X 2 (VTE) on chronic coumadin, ambulatory dysfunction secondary to diabetic neuropathy, -uses cane, rolling walker and wheelchair-B/L arthritis of knees presents for follow up visit and prescription renewals\par She reports compliance with taking her meds daily\par \par \par She went out walking with her sister 2 days ago and yesterday she noticed the leg being swollen and and a little red. She kept it up all day yesterday and it was a little better. Today however the leg is more red and swollen and it's hot to the touch\par She is on duloxetine 60mg, and Lyrica 75mg TID. Also takes percocet 5/325 BID\par \par

## 2019-08-14 NOTE — PHYSICAL EXAM
[No Acute Distress] : no acute distress [Well Nourished] : well nourished [Well-Appearing] : well-appearing [Well Developed] : well developed [Normal Voice/Communication] : normal voice/communication [Normal Sclera/Conjunctiva] : normal sclera/conjunctiva [EOMI] : extraocular movements intact [Normal Outer Ear/Nose] : the outer ears and nose were normal in appearance [No Respiratory Distress] : no respiratory distress  [Clear to Auscultation] : lungs were clear to auscultation bilaterally [No Accessory Muscle Use] : no accessory muscle use [Regular Rhythm] : with a regular rhythm [Normal Rate] : normal rate  [Normal S1, S2] : normal S1 and S2 [Soft] : abdomen soft [No Murmur] : no murmur heard [No Masses] : no abdominal mass palpated [Normal Bowel Sounds] : normal bowel sounds [Speech Grossly Normal] : speech grossly normal [Normal Affect] : the affect was normal [Memory Grossly Normal] : memory grossly normal [Normal Mood] : the mood was normal [Alert and Oriented x3] : oriented to person, place, and time [Normal Insight/Judgement] : insight and judgment were intact [de-identified] : obese [de-identified] : ambulating with cane [de-identified] : Hx of diabetic neuropathy-ambulating with cane

## 2019-09-03 RX ORDER — BLOOD-GLUCOSE METER
KIT MISCELLANEOUS
Qty: 1 | Refills: 0 | Status: ACTIVE | COMMUNITY
Start: 2019-09-03 | End: 1900-01-01

## 2019-10-03 ENCOUNTER — LABORATORY RESULT (OUTPATIENT)
Age: 49
End: 2019-10-03

## 2019-10-10 ENCOUNTER — APPOINTMENT (OUTPATIENT)
Dept: BARIATRICS/WEIGHT MGMT | Facility: CLINIC | Age: 49
End: 2019-10-10
Payer: MEDICARE

## 2019-10-10 VITALS
HEART RATE: 76 BPM | OXYGEN SATURATION: 95 % | SYSTOLIC BLOOD PRESSURE: 130 MMHG | DIASTOLIC BLOOD PRESSURE: 70 MMHG | WEIGHT: 229.13 LBS | HEIGHT: 64 IN | BODY MASS INDEX: 39.12 KG/M2

## 2019-10-10 PROCEDURE — 99214 OFFICE O/P EST MOD 30 MIN: CPT

## 2019-10-10 RX ORDER — DOCUSATE SODIUM 100 MG/1
100 CAPSULE ORAL 3 TIMES DAILY
Qty: 90 | Refills: 5 | Status: DISCONTINUED | COMMUNITY
Start: 2019-04-22 | End: 2019-10-10

## 2019-10-11 ENCOUNTER — LABORATORY RESULT (OUTPATIENT)
Age: 49
End: 2019-10-11

## 2019-10-16 ENCOUNTER — LABORATORY RESULT (OUTPATIENT)
Age: 49
End: 2019-10-16

## 2019-10-17 NOTE — ASSESSMENT
[FreeTextEntry1] : BARIATRIC SURGERY HISTORY: gastric sleeve\par \par OBESITY COMORBIDITIES: DM, HTN, HLD\par \par ANTI-OBESITY MEDICATIONS: bupropion\par \par OBESITY MEDICATION SIDE EFFECTS: none\par \par  \par Plan: \par \par Continue current food plan\par Use bike, and continue walking as tolerated\par Continue bupropion for 1 more month, and will consider restarting phentermine at next visit \par \par RTO 6 weeks

## 2019-10-17 NOTE — HISTORY OF PRESENT ILLNESS
[FreeTextEntry1] : This is a 49 year old female present in office today for weight loss management\par \par Patient's weight unchanged since last visit. She has been off phentermine, now taking bupropion, tolerating well. Her food choices have not changed. She has been walking more with cane, and continues to use sedentary bike at home for 15 minutes a day. She has been limited the last few days due BL foot pain. She sleeps 6-7 hours, without disruption. \par She went to cardiologist, echo done, no significant changes compared to echo in 2016

## 2019-10-25 ENCOUNTER — MEDICATION RENEWAL (OUTPATIENT)
Age: 49
End: 2019-10-25

## 2019-10-31 ENCOUNTER — LABORATORY RESULT (OUTPATIENT)
Age: 49
End: 2019-10-31

## 2019-10-31 RX ORDER — WARFARIN 4 MG/1
4 TABLET ORAL
Qty: 30 | Refills: 0 | Status: DISCONTINUED | COMMUNITY
Start: 2019-10-23 | End: 2019-10-31

## 2019-10-31 RX ORDER — WARFARIN 3 MG/1
3 TABLET ORAL
Qty: 30 | Refills: 0 | Status: DISCONTINUED | COMMUNITY
Start: 2019-10-23 | End: 2019-10-31

## 2019-11-02 ENCOUNTER — MEDICATION RENEWAL (OUTPATIENT)
Age: 49
End: 2019-11-02

## 2019-11-06 ENCOUNTER — APPOINTMENT (OUTPATIENT)
Dept: INTERNAL MEDICINE | Facility: CLINIC | Age: 49
End: 2019-11-06
Payer: MEDICARE

## 2019-11-06 VITALS
HEIGHT: 64 IN | TEMPERATURE: 98.6 F | SYSTOLIC BLOOD PRESSURE: 107 MMHG | HEART RATE: 82 BPM | WEIGHT: 237 LBS | DIASTOLIC BLOOD PRESSURE: 70 MMHG | OXYGEN SATURATION: 98 % | BODY MASS INDEX: 40.46 KG/M2 | RESPIRATION RATE: 12 BRPM

## 2019-11-06 PROCEDURE — 85610 PROTHROMBIN TIME: CPT | Mod: QW

## 2019-11-06 PROCEDURE — 99214 OFFICE O/P EST MOD 30 MIN: CPT | Mod: 25

## 2019-11-15 ENCOUNTER — LABORATORY RESULT (OUTPATIENT)
Age: 49
End: 2019-11-15

## 2019-11-24 NOTE — REVIEW OF SYSTEMS
[Negative] : Heme/Lymph [FreeTextEntry9] : ambulatory dysfunction [de-identified] : B/L foot neuropathy 2/2 DM

## 2019-11-24 NOTE — HISTORY OF PRESENT ILLNESS
[de-identified] : 50 yo female, with PMHx of morbid obesity,S/P gastric sleeve surgery Nov 2017-has lost about 190 lbs),  DM type II, hypothyroidism, chronic peripheral neuropathy, severe YVONNE, PE X 2 (VTE) on chronic coumadin, ambulatory dysfunction secondary to diabetic neuropathy, -uses cane, rolling walker and wheelchair-B/L arthritis of knees presents for follow up visit and INR check\par She reports compliance with taking her meds daily\par \par \par \par

## 2019-11-24 NOTE — ASSESSMENT
[FreeTextEntry1] : Hx of PE\par on coumadin\par INR checked today\par \par Hx of diabetic neuropathy\par f/u with neurology-referral given

## 2019-11-24 NOTE — PHYSICAL EXAM
[No Acute Distress] : no acute distress [Well Nourished] : well nourished [Well Developed] : well developed [Well-Appearing] : well-appearing [Normal Voice/Communication] : normal voice/communication [Normal Sclera/Conjunctiva] : normal sclera/conjunctiva [EOMI] : extraocular movements intact [Normal Outer Ear/Nose] : the outer ears and nose were normal in appearance [No Respiratory Distress] : no respiratory distress  [Clear to Auscultation] : lungs were clear to auscultation bilaterally [No Accessory Muscle Use] : no accessory muscle use [Normal Rate] : normal rate  [Regular Rhythm] : with a regular rhythm [Normal S1, S2] : normal S1 and S2 [No Murmur] : no murmur heard [Soft] : abdomen soft [No Masses] : no abdominal mass palpated [Normal Bowel Sounds] : normal bowel sounds [Speech Grossly Normal] : speech grossly normal [Memory Grossly Normal] : memory grossly normal [Normal Affect] : the affect was normal [Alert and Oriented x3] : oriented to person, place, and time [Normal Mood] : the mood was normal [Normal Insight/Judgement] : insight and judgment were intact [de-identified] : obese [de-identified] : ambulating with cane [de-identified] : Hx of diabetic neuropathy-ambulating with cane

## 2019-11-26 ENCOUNTER — APPOINTMENT (OUTPATIENT)
Dept: SURGERY | Facility: CLINIC | Age: 49
End: 2019-11-26
Payer: MEDICARE

## 2019-11-26 VITALS
TEMPERATURE: 98.5 F | HEIGHT: 64 IN | HEART RATE: 85 BPM | DIASTOLIC BLOOD PRESSURE: 84 MMHG | SYSTOLIC BLOOD PRESSURE: 128 MMHG | OXYGEN SATURATION: 96 % | WEIGHT: 233.5 LBS | RESPIRATION RATE: 15 BRPM | BODY MASS INDEX: 39.86 KG/M2

## 2019-11-26 PROCEDURE — 99212 OFFICE O/P EST SF 10 MIN: CPT

## 2019-11-26 RX ORDER — CHOLECALCIFEROL (VITAMIN D3) 50 MCG
50 MCG TABLET ORAL
Qty: 30 | Refills: 3 | Status: DISCONTINUED | COMMUNITY
Start: 2018-05-07 | End: 2019-11-26

## 2019-11-26 RX ORDER — PHENTERMINE HYDROCHLORIDE 15 MG/1
15 CAPSULE ORAL
Qty: 30 | Refills: 0 | Status: DISCONTINUED | COMMUNITY
Start: 2019-01-30 | End: 2019-11-26

## 2019-11-26 RX ORDER — PHENTERMINE HYDROCHLORIDE 30 MG/1
30 CAPSULE ORAL
Qty: 30 | Refills: 0 | Status: DISCONTINUED | COMMUNITY
Start: 2019-04-19 | End: 2019-11-26

## 2019-11-26 RX ORDER — CHOLECALCIFEROL (VITAMIN D3) 1250 MCG
1.25 MG CAPSULE ORAL
Qty: 6 | Refills: 1 | Status: DISCONTINUED | COMMUNITY
Start: 2018-03-23 | End: 2019-11-26

## 2019-11-27 ENCOUNTER — APPOINTMENT (OUTPATIENT)
Dept: BARIATRICS/WEIGHT MGMT | Facility: CLINIC | Age: 49
End: 2019-11-27
Payer: MEDICARE

## 2019-11-27 VITALS
DIASTOLIC BLOOD PRESSURE: 70 MMHG | HEIGHT: 64 IN | OXYGEN SATURATION: 97 % | SYSTOLIC BLOOD PRESSURE: 120 MMHG | BODY MASS INDEX: 39.78 KG/M2 | WEIGHT: 233 LBS | HEART RATE: 84 BPM

## 2019-11-27 PROCEDURE — 99214 OFFICE O/P EST MOD 30 MIN: CPT

## 2019-12-03 NOTE — ASSESSMENT
[FreeTextEntry1] : BARIATRIC SURGERY HISTORY: gastric sleeve\par \par OBESITY COMORBIDITIES: DM, HTN, HLD\par \par ANTI-OBESITY MEDICATIONS: bupropion\par \par OBESITY MEDICATION SIDE EFFECTS: none\par \par  \par Plan: \par \par Continue current food plan\par Use bike, and continue walking as tolerated\par restart phentermine\par \par rtc in 4 weeks.

## 2019-12-03 NOTE — HISTORY OF PRESENT ILLNESS
[FreeTextEntry1] : 48 yo woman with past history of morbid obesity, dm2, s/p sleeve gastrectomy\par \par Patient's weight up 4 lbs since last visit though total weight loss 160+ lbs.  mood is better.  she is using stationary bike at home and walking more.  food about the same - calorie restricted. she is without new complaints though concerned about weight (she has been on bupropion instead of phentermine)

## 2019-12-04 ENCOUNTER — LABORATORY RESULT (OUTPATIENT)
Age: 49
End: 2019-12-04

## 2019-12-12 ENCOUNTER — RESULT CHARGE (OUTPATIENT)
Age: 49
End: 2019-12-12

## 2019-12-16 ENCOUNTER — LABORATORY RESULT (OUTPATIENT)
Age: 49
End: 2019-12-16

## 2019-12-22 LAB
INR PPP: 1.8 RATIO
POCT-PROTHROMBIN TIME: 21.6 SECS

## 2019-12-30 ENCOUNTER — RX RENEWAL (OUTPATIENT)
Age: 49
End: 2019-12-30

## 2020-01-06 ENCOUNTER — LABORATORY RESULT (OUTPATIENT)
Age: 50
End: 2020-01-06

## 2020-01-06 ENCOUNTER — MEDICATION RENEWAL (OUTPATIENT)
Age: 50
End: 2020-01-06

## 2020-01-08 ENCOUNTER — APPOINTMENT (OUTPATIENT)
Dept: BARIATRICS/WEIGHT MGMT | Facility: CLINIC | Age: 50
End: 2020-01-08
Payer: MEDICARE

## 2020-01-08 ENCOUNTER — RX RENEWAL (OUTPATIENT)
Age: 50
End: 2020-01-08

## 2020-01-08 VITALS
DIASTOLIC BLOOD PRESSURE: 84 MMHG | OXYGEN SATURATION: 97 % | HEIGHT: 64 IN | HEART RATE: 90 BPM | WEIGHT: 235.6 LBS | BODY MASS INDEX: 40.22 KG/M2 | SYSTOLIC BLOOD PRESSURE: 138 MMHG

## 2020-01-08 VITALS — HEIGHT: 64 IN | BODY MASS INDEX: 40.22 KG/M2 | WEIGHT: 235.6 LBS

## 2020-01-08 PROCEDURE — 99214 OFFICE O/P EST MOD 30 MIN: CPT

## 2020-01-29 ENCOUNTER — APPOINTMENT (OUTPATIENT)
Dept: BARIATRICS/WEIGHT MGMT | Facility: CLINIC | Age: 50
End: 2020-01-29
Payer: MEDICARE

## 2020-01-29 VITALS
SYSTOLIC BLOOD PRESSURE: 148 MMHG | DIASTOLIC BLOOD PRESSURE: 86 MMHG | OXYGEN SATURATION: 96 % | HEART RATE: 101 BPM | BODY MASS INDEX: 40.29 KG/M2 | HEIGHT: 64 IN | WEIGHT: 236 LBS

## 2020-01-29 DIAGNOSIS — R26.89 OTHER ABNORMALITIES OF GAIT AND MOBILITY: ICD-10-CM

## 2020-01-29 PROCEDURE — 99214 OFFICE O/P EST MOD 30 MIN: CPT

## 2020-01-29 RX ORDER — BUPROPION HYDROCHLORIDE 150 MG/1
150 TABLET, EXTENDED RELEASE ORAL
Qty: 60 | Refills: 5 | Status: DISCONTINUED | COMMUNITY
Start: 2019-07-25 | End: 2020-01-29

## 2020-01-30 NOTE — HISTORY OF PRESENT ILLNESS
[FreeTextEntry1] : 48 yo woman with past history of morbid obesity, dm2, s/p sleeve gastrectomy\par \par Patient's weight mostly unchanged since last visit, though total weight loss 160+ lbs. She was restarted on phentermine 30mg \par  she is using stationary bike at home 20-30 minutes daily and walking more with cane. She reports balance issues if she tries to walk too quickly.\par food about the same - although she is snacking on protein drinks/bars in between both breakfast and lunch, and lunch and dinner .

## 2020-01-30 NOTE — ASSESSMENT
[FreeTextEntry1] : BARIATRIC SURGERY HISTORY: gastric sleeve\par \par OBESITY COMORBIDITIES: DM (resolved), HTN (resolved), HLD (resolved)\par \par ANTI-OBESITY MEDICATIONS: bupropion (not currently taking), victoza (not taking post-op), phentermine\par \par OBESITY MEDICATION SIDE EFFECTS: none\par \par  \par Plan: \par \par Continue current food plan. Increase fiber, reduce snacking, specifically on protein bars/drinks. If hungry have a piece of fruit\par Use bike, and continue walking as tolerated\par increase phentermine 37.5\par \par rtc in 4 weeks.

## 2020-01-31 ENCOUNTER — RX RENEWAL (OUTPATIENT)
Age: 50
End: 2020-01-31

## 2020-02-06 ENCOUNTER — LABORATORY RESULT (OUTPATIENT)
Age: 50
End: 2020-02-06

## 2020-02-09 NOTE — HISTORY OF PRESENT ILLNESS
[FreeTextEntry1] : 48 yo woman with past history of morbid obesity, dm2, s/p sleeve gastrectomy\par \par Patient's weight up 2 lbs since last visit though body fat down 1 lb and total weight down roughly 200lbs.   appetite a little larger than it had been .   she is using stationary bike at home and walking more.  food about the same - calorie restricted. mood ok since switching back from bupropion to phentermine.  she is without new complaints today.

## 2020-02-09 NOTE — ASSESSMENT
[FreeTextEntry1] : BARIATRIC SURGERY HISTORY: gastric sleeve\par \par OBESITY COMORBIDITIES: DM (a1c now normal), HTN, HLD (all resolved post weight loss/surgery)\par \par ANTI-OBESITY MEDICATIONS: phentermine\par \par OBESITY MEDICATION SIDE EFFECTS: none\par \par  \par Plan: \par recommend adding more fiber to diet\par Use bike, and continue walking as tolerated\par cont phentermine\par \par rtc in 4 weeks.

## 2020-02-19 ENCOUNTER — RX RENEWAL (OUTPATIENT)
Age: 50
End: 2020-02-19

## 2020-02-20 ENCOUNTER — LABORATORY RESULT (OUTPATIENT)
Age: 50
End: 2020-02-20

## 2020-02-21 ENCOUNTER — RX RENEWAL (OUTPATIENT)
Age: 50
End: 2020-02-21

## 2020-02-26 ENCOUNTER — APPOINTMENT (OUTPATIENT)
Dept: INTERNAL MEDICINE | Facility: CLINIC | Age: 50
End: 2020-02-26
Payer: MEDICARE

## 2020-02-26 VITALS
RESPIRATION RATE: 14 BRPM | OXYGEN SATURATION: 96 % | TEMPERATURE: 98.2 F | HEIGHT: 64 IN | BODY MASS INDEX: 39.78 KG/M2 | SYSTOLIC BLOOD PRESSURE: 115 MMHG | HEART RATE: 96 BPM | DIASTOLIC BLOOD PRESSURE: 71 MMHG | WEIGHT: 233 LBS

## 2020-02-26 LAB
INR PPP: 4.3 RATIO
POCT-PROTHROMBIN TIME: 51.3 SECS

## 2020-02-26 PROCEDURE — 99214 OFFICE O/P EST MOD 30 MIN: CPT

## 2020-02-26 PROCEDURE — 85610 PROTHROMBIN TIME: CPT | Mod: QW

## 2020-02-27 ENCOUNTER — APPOINTMENT (OUTPATIENT)
Dept: BARIATRICS/WEIGHT MGMT | Facility: CLINIC | Age: 50
End: 2020-02-27
Payer: MEDICARE

## 2020-02-27 VITALS
SYSTOLIC BLOOD PRESSURE: 116 MMHG | BODY MASS INDEX: 39.64 KG/M2 | OXYGEN SATURATION: 97 % | WEIGHT: 232.2 LBS | DIASTOLIC BLOOD PRESSURE: 82 MMHG | HEART RATE: 107 BPM | HEIGHT: 64 IN

## 2020-02-27 PROCEDURE — 99214 OFFICE O/P EST MOD 30 MIN: CPT

## 2020-02-28 ENCOUNTER — RX RENEWAL (OUTPATIENT)
Age: 50
End: 2020-02-28

## 2020-03-01 NOTE — HISTORY OF PRESENT ILLNESS
[de-identified] : Ms. CONY HOUSTON is a 49 year old female, with PMHx of morbid obesity,S/P gastric sleeve surgery Nov 2017-has lost about 190 lbs),  DM type II, hypothyroidism, chronic peripheral neuropathy, severe YVONNE, PE X 2 (VTE) on chronic coumadin, ambulatory dysfunction secondary to diabetic neuropathy, -uses cane, rolling walker and wheelchair-B/L arthritis of knees presents for follow up visit and INR check\par She reports compliance with taking her meds daily\par \par \par \par

## 2020-03-01 NOTE — ASSESSMENT
[FreeTextEntry1] : INR 4.3\par pt instructed not to take coumadin tonight\par decrease coumadin to 5mg daily starting tomoroow\par repeat INR Tuesday\par

## 2020-03-01 NOTE — PHYSICAL EXAM
[No Acute Distress] : no acute distress [Well Developed] : well developed [Well Nourished] : well nourished [Well-Appearing] : well-appearing [Normal Voice/Communication] : normal voice/communication [EOMI] : extraocular movements intact [Normal Sclera/Conjunctiva] : normal sclera/conjunctiva [Normal Outer Ear/Nose] : the outer ears and nose were normal in appearance [Clear to Auscultation] : lungs were clear to auscultation bilaterally [No Respiratory Distress] : no respiratory distress  [Normal Rate] : normal rate  [No Accessory Muscle Use] : no accessory muscle use [Regular Rhythm] : with a regular rhythm [Normal S1, S2] : normal S1 and S2 [No Murmur] : no murmur heard [Soft] : abdomen soft [No Masses] : no abdominal mass palpated [Normal Bowel Sounds] : normal bowel sounds [Speech Grossly Normal] : speech grossly normal [Normal Affect] : the affect was normal [Memory Grossly Normal] : memory grossly normal [Alert and Oriented x3] : oriented to person, place, and time [Normal Mood] : the mood was normal [Normal Insight/Judgement] : insight and judgment were intact [de-identified] : obese [de-identified] : + varicosities [de-identified] : ambulating with cane [de-identified] : Hx of diabetic neuropathy-ambulating with cane

## 2020-03-01 NOTE — REVIEW OF SYSTEMS
[Negative] : Heme/Lymph [FreeTextEntry9] : ambulatory dysfunction [de-identified] : B/L foot neuropathy 2/2 DM

## 2020-03-02 ENCOUNTER — RESULT CHARGE (OUTPATIENT)
Age: 50
End: 2020-03-02

## 2020-03-02 NOTE — ASSESSMENT
[FreeTextEntry1] : BARIATRIC SURGERY HISTORY: gastric sleeve\par \par OBESITY COMORBIDITIES: DM (resolved), HTN (resolved), HLD (resolved)\par \par ANTI-OBESITY MEDICATIONS: bupropion (not currently taking), victoza (not taking post-op), phentermine\par \par OBESITY MEDICATION SIDE EFFECTS: none\par \par  \par Plan: \par \par Continue current food plan. Increase fiber, reduce snacking, specifically on protein bars/drinks. \par \par increase phentermine 37.5\par \par rtc in 4 weeks. \par

## 2020-03-02 NOTE — HISTORY OF PRESENT ILLNESS
[FreeTextEntry1] : Patient has lost 4 pounds since last visit, possibly 6 pounds of fat. \par \par She states she stopped eating perico bars, and protein shakes. Will eat apple or pudding in between breakfast and lunch now. She eats 9a-930pm. Multiple small meals throughout the day. \par \par Patient had varicose vein removal in left leg, she will get her right leg done in 3 weeks. She was advised to avoid  exercise at this time\par

## 2020-03-04 ENCOUNTER — APPOINTMENT (OUTPATIENT)
Dept: ORTHOPEDIC SURGERY | Facility: CLINIC | Age: 50
End: 2020-03-04
Payer: MEDICARE

## 2020-03-04 VITALS
HEIGHT: 63.5 IN | WEIGHT: 226 LBS | SYSTOLIC BLOOD PRESSURE: 126 MMHG | HEART RATE: 90 BPM | BODY MASS INDEX: 39.55 KG/M2 | DIASTOLIC BLOOD PRESSURE: 78 MMHG

## 2020-03-04 DIAGNOSIS — M17.0 BILATERAL PRIMARY OSTEOARTHRITIS OF KNEE: ICD-10-CM

## 2020-03-04 PROCEDURE — 73562 X-RAY EXAM OF KNEE 3: CPT | Mod: RT

## 2020-03-04 PROCEDURE — 99204 OFFICE O/P NEW MOD 45 MIN: CPT

## 2020-03-05 ENCOUNTER — LABORATORY RESULT (OUTPATIENT)
Age: 50
End: 2020-03-05

## 2020-03-05 PROBLEM — M17.0 OSTEOARTHRITIS OF KNEES, BILATERAL: Status: ACTIVE | Noted: 2020-03-05

## 2020-03-06 LAB
INR PPP: 2.6 RATIO
POCT-PROTHROMBIN TIME: 31 SECS
QUALITY CONTROL: YES

## 2020-03-19 ENCOUNTER — LABORATORY RESULT (OUTPATIENT)
Age: 50
End: 2020-03-19

## 2020-03-26 ENCOUNTER — APPOINTMENT (OUTPATIENT)
Dept: BARIATRICS/WEIGHT MGMT | Facility: CLINIC | Age: 50
End: 2020-03-26
Payer: MEDICARE

## 2020-03-26 PROCEDURE — 99441: CPT

## 2020-03-26 RX ORDER — MULTIVIT-MIN/IRON/FOLIC ACID/K 18-600-40
500 CAPSULE ORAL DAILY
Qty: 30 | Refills: 0 | Status: ACTIVE | COMMUNITY
Start: 2020-03-26 | End: 1900-01-01

## 2020-04-02 ENCOUNTER — LABORATORY RESULT (OUTPATIENT)
Age: 50
End: 2020-04-02

## 2020-04-16 ENCOUNTER — LABORATORY RESULT (OUTPATIENT)
Age: 50
End: 2020-04-16

## 2020-04-23 ENCOUNTER — RX RENEWAL (OUTPATIENT)
Age: 50
End: 2020-04-23

## 2020-04-23 ENCOUNTER — APPOINTMENT (OUTPATIENT)
Dept: BARIATRICS/WEIGHT MGMT | Facility: CLINIC | Age: 50
End: 2020-04-23
Payer: MEDICARE

## 2020-04-23 PROCEDURE — 99442: CPT

## 2020-04-23 RX ORDER — PHENTERMINE HYDROCHLORIDE 30 MG/1
30 CAPSULE ORAL
Qty: 30 | Refills: 0 | Status: DISCONTINUED | COMMUNITY
Start: 2019-11-27 | End: 2020-04-23

## 2020-04-28 ENCOUNTER — RX RENEWAL (OUTPATIENT)
Age: 50
End: 2020-04-28

## 2020-04-30 ENCOUNTER — LABORATORY RESULT (OUTPATIENT)
Age: 50
End: 2020-04-30

## 2020-05-21 ENCOUNTER — APPOINTMENT (OUTPATIENT)
Dept: BARIATRICS/WEIGHT MGMT | Facility: CLINIC | Age: 50
End: 2020-05-21
Payer: MEDICARE

## 2020-05-21 PROCEDURE — 99442: CPT | Mod: 95

## 2020-05-31 ENCOUNTER — RX RENEWAL (OUTPATIENT)
Age: 50
End: 2020-05-31

## 2020-06-08 ENCOUNTER — RX RENEWAL (OUTPATIENT)
Age: 50
End: 2020-06-08

## 2020-06-18 LAB
INR PPP: 2.76 RATIO
PT BLD: 32.2 SEC

## 2020-06-23 ENCOUNTER — RX RENEWAL (OUTPATIENT)
Age: 50
End: 2020-06-23

## 2020-06-24 ENCOUNTER — RX RENEWAL (OUTPATIENT)
Age: 50
End: 2020-06-24

## 2020-07-09 ENCOUNTER — APPOINTMENT (OUTPATIENT)
Dept: BARIATRICS/WEIGHT MGMT | Facility: CLINIC | Age: 50
End: 2020-07-09

## 2020-08-27 ENCOUNTER — APPOINTMENT (OUTPATIENT)
Dept: INTERNAL MEDICINE | Facility: CLINIC | Age: 50
End: 2020-08-27
Payer: MEDICARE

## 2020-08-27 PROCEDURE — 99214 OFFICE O/P EST MOD 30 MIN: CPT | Mod: 95

## 2020-08-27 RX ORDER — WARFARIN 4 MG/1
4 TABLET ORAL
Qty: 30 | Refills: 0 | Status: ACTIVE | COMMUNITY
Start: 2020-05-19 | End: 1900-01-01

## 2020-08-27 NOTE — HISTORY OF PRESENT ILLNESS
[Home] : at home, [unfilled] , at the time of the visit. [Medical Office: ([facility])___] : at the medical office located in  [Verbal consent obtained from patient] : the patient, [unfilled] [de-identified] : Ms. CONY HOUSTON is a 49 year old female, with PMHx of morbid obesity,S/P gastric sleeve surgery Nov 2017-has lost about 190 lbs),  DM type II, hypothyroidism, chronic peripheral neuropathy, severe YVONNE, PE X 2 (VTE) on chronic coumadin, ambulatory dysfunction secondary to diabetic neuropathy, -uses cane, rolling walker and wheelchair-B/L arthritis of knees seen in telemedicine for follow up visit  and Rx renewals\par She complaints of a rash on her upper left chest that started 2 days ago. Says it's itchy and she used neosporin on it\par Reports compliance with taking her meds daily\par Had INR check Friday--> INR 2.4 \par \par \par \par

## 2020-08-27 NOTE — PHYSICAL EXAM
[Normal] : no acute distress, well nourished, well developed and well-appearing [No Respiratory Distress] : no respiratory distress  [Speech Grossly Normal] : speech grossly normal [Alert and Oriented x3] : oriented to person, place, and time [Normal Insight/Judgement] : insight and judgment were intact [de-identified] : mild erythematous itchy rash  left upper chest

## 2020-08-27 NOTE — ASSESSMENT
[FreeTextEntry1] : dermatitis\par Ultravate cream\par \par Hx of morbid obesity,S/P gastric sleeve surgery Nov 2017-has lost about 190 lbs),  DM type II, hypothyroidism, chronic peripheral neuropathy, severe YVONNE, PE X 2 (VTE) on chronic coumadin, ambulatory dysfunction, diabetic neuropathy,\par cont current meds-renewed toda-see plan\par

## 2020-10-01 ENCOUNTER — RX RENEWAL (OUTPATIENT)
Age: 50
End: 2020-10-01

## 2020-10-08 ENCOUNTER — APPOINTMENT (OUTPATIENT)
Dept: BARIATRICS/WEIGHT MGMT | Facility: CLINIC | Age: 50
End: 2020-10-08
Payer: MEDICARE

## 2020-10-08 PROCEDURE — 99442: CPT | Mod: 95

## 2020-11-09 ENCOUNTER — RX RENEWAL (OUTPATIENT)
Age: 50
End: 2020-11-09

## 2021-01-04 ENCOUNTER — LABORATORY RESULT (OUTPATIENT)
Age: 51
End: 2021-01-04

## 2021-01-06 LAB
INR PPP: 1.81 RATIO
PT BLD: 20.9 SEC

## 2021-01-19 ENCOUNTER — APPOINTMENT (OUTPATIENT)
Dept: BARIATRICS/WEIGHT MGMT | Facility: CLINIC | Age: 51
End: 2021-01-19
Payer: MEDICARE

## 2021-01-19 DIAGNOSIS — R26.2 DIFFICULTY IN WALKING, NOT ELSEWHERE CLASSIFIED: ICD-10-CM

## 2021-01-19 PROCEDURE — 99442: CPT | Mod: 95

## 2021-01-20 ENCOUNTER — RX RENEWAL (OUTPATIENT)
Age: 51
End: 2021-01-20

## 2021-01-21 ENCOUNTER — LABORATORY RESULT (OUTPATIENT)
Age: 51
End: 2021-01-21

## 2021-01-22 NOTE — DISCHARGE NOTE ADULT - ABILITY TO HEAR (WITH HEARING AID OR HEARING APPLIANCE IF NORMALLY USED):
stop heparin drip. lovenox will be ordered give 1 hour after drip is stopped.
Adequate: hears normal conversation without difficulty

## 2021-01-31 PROBLEM — R26.2 DIFFICULTY WALKING: Status: ACTIVE | Noted: 2017-05-12

## 2021-02-02 ENCOUNTER — APPOINTMENT (OUTPATIENT)
Dept: INTERNAL MEDICINE | Facility: CLINIC | Age: 51
End: 2021-02-02
Payer: MEDICARE

## 2021-02-02 PROCEDURE — 99214 OFFICE O/P EST MOD 30 MIN: CPT | Mod: 95

## 2021-02-02 NOTE — ASSESSMENT
[FreeTextEntry1] : Hx of PE\par INR 1.37\par repeat INR ordered today\par cont coumadin 7.5mg daily\par \par Hx of morbid obesity,S/P gastric sleeve surgery Nov 2017\par following with Obesity medicine \par \par DM type II-not on meds anymore\par cont low carb/low sugar diet\par \par hypothyroidism, chronic peripheral neuropathy, severe YVONNE ambulatory dysfunction secondary to diabetic neuropathy\par cont current meds-renewed today\par \par blood work ordered\par \par follow up in one week for lab results\par

## 2021-02-02 NOTE — HISTORY OF PRESENT ILLNESS
[Home] : at home, [unfilled] , at the time of the visit. [Medical Office: ([facility])___] : at the medical office located in  [de-identified] : Ms. CONY HOUSTON is a 50 year old female, with PMHx of morbid obesity,S/P gastric sleeve surgery Nov 2017-has lost about 190 lbs),  DM type II-not on ant meds since having the surgery hypothyroidism, chronic peripheral neuropathy, severe YVONNE, PE X 2 (VTE) on chronic coumadin, ambulatory dysfunction secondary to diabetic neuropathy, -uses cane, rolling walker and wheelchair-B/L arthritis of knees seen in telemedicine for follow up visit  and Rx renewals\par She is doing well, offers no complaints\par Reports compliance with taking her meds daily\par Last INR was 1.37\par Pt is on Coumadin 7.5mg po daily. Says she had eaten some greens which she usually says she dopes not eat\par \par \par \par \par

## 2021-02-11 LAB
ALBUMIN SERPL ELPH-MCNC: 3.9 G/DL
ALP BLD-CCNC: 64 U/L
ALT SERPL-CCNC: 22 U/L
ANION GAP SERPL CALC-SCNC: 10 MMOL/L
AST SERPL-CCNC: 22 U/L
BILIRUB SERPL-MCNC: 0.2 MG/DL
BUN SERPL-MCNC: 16 MG/DL
CALCIUM SERPL-MCNC: 8.7 MG/DL
CHLORIDE SERPL-SCNC: 104 MMOL/L
CHOLEST SERPL-MCNC: 132 MG/DL
CO2 SERPL-SCNC: 27 MMOL/L
CREAT SERPL-MCNC: 0.62 MG/DL
ESTIMATED AVERAGE GLUCOSE: 91 MG/DL
GLUCOSE SERPL-MCNC: 73 MG/DL
HBA1C MFR BLD HPLC: 4.8 %
HDLC SERPL-MCNC: 49 MG/DL
LDLC SERPL CALC-MCNC: 71 MG/DL
NONHDLC SERPL-MCNC: 82 MG/DL
POTASSIUM SERPL-SCNC: 4.4 MMOL/L
PROT SERPL-MCNC: 7.1 G/DL
SODIUM SERPL-SCNC: 142 MMOL/L
TRIGL SERPL-MCNC: 54 MG/DL
TSH SERPL-ACNC: 0.97 UIU/ML

## 2021-02-12 LAB
BASOPHILS # BLD AUTO: 0.02 K/UL
BASOPHILS NFR BLD AUTO: 0.5 %
EOSINOPHIL # BLD AUTO: 0.07 K/UL
EOSINOPHIL NFR BLD AUTO: 1.7 %
HCT VFR BLD CALC: 42 %
HGB BLD-MCNC: 12.9 G/DL
IMM GRANULOCYTES NFR BLD AUTO: 0 %
INR PPP: 2.02 RATIO
LYMPHOCYTES # BLD AUTO: 1.07 K/UL
LYMPHOCYTES NFR BLD AUTO: 25.6 %
MAN DIFF?: NORMAL
MCHC RBC-ENTMCNC: 27.6 PG
MCHC RBC-ENTMCNC: 30.7 GM/DL
MCV RBC AUTO: 89.9 FL
MONOCYTES # BLD AUTO: 0.53 K/UL
MONOCYTES NFR BLD AUTO: 12.7 %
NEUTROPHILS # BLD AUTO: 2.49 K/UL
NEUTROPHILS NFR BLD AUTO: 59.5 %
PLATELET # BLD AUTO: 162 K/UL
PT BLD: 23.3 SEC
RBC # BLD: 4.67 M/UL
RBC # FLD: 14.4 %
WBC # FLD AUTO: 4.18 K/UL

## 2021-02-20 ENCOUNTER — RX RENEWAL (OUTPATIENT)
Age: 51
End: 2021-02-20

## 2021-02-24 ENCOUNTER — LABORATORY RESULT (OUTPATIENT)
Age: 51
End: 2021-02-24

## 2021-03-04 ENCOUNTER — RX RENEWAL (OUTPATIENT)
Age: 51
End: 2021-03-04

## 2021-03-09 ENCOUNTER — RX RENEWAL (OUTPATIENT)
Age: 51
End: 2021-03-09

## 2021-03-09 ENCOUNTER — LABORATORY RESULT (OUTPATIENT)
Age: 51
End: 2021-03-09

## 2021-03-09 LAB
ALBUMIN SERPL ELPH-MCNC: 4.1 G/DL
ALP BLD-CCNC: 71 U/L
ALT SERPL-CCNC: 23 U/L
ANION GAP SERPL CALC-SCNC: 8 MMOL/L
AST SERPL-CCNC: 21 U/L
BASOPHILS # BLD AUTO: 0.03 K/UL
BASOPHILS NFR BLD AUTO: 0.6 %
BILIRUB SERPL-MCNC: 0.2 MG/DL
BUN SERPL-MCNC: 13 MG/DL
CALCIUM SERPL-MCNC: 8.8 MG/DL
CHLORIDE SERPL-SCNC: 104 MMOL/L
CHOLEST SERPL-MCNC: 133 MG/DL
CO2 SERPL-SCNC: 28 MMOL/L
CREAT SERPL-MCNC: 0.62 MG/DL
EOSINOPHIL # BLD AUTO: 0.08 K/UL
EOSINOPHIL NFR BLD AUTO: 1.5 %
ESTIMATED AVERAGE GLUCOSE: 94 MG/DL
GLUCOSE SERPL-MCNC: 81 MG/DL
HBA1C MFR BLD HPLC: 4.9 %
HCT VFR BLD CALC: 39.7 %
HDLC SERPL-MCNC: 56 MG/DL
HGB BLD-MCNC: 12.6 G/DL
IMM GRANULOCYTES NFR BLD AUTO: 0.2 %
INR PPP: 1.89 RATIO
LDLC SERPL CALC-MCNC: 65 MG/DL
LYMPHOCYTES # BLD AUTO: 1.31 K/UL
LYMPHOCYTES NFR BLD AUTO: 24.8 %
MAN DIFF?: NORMAL
MCHC RBC-ENTMCNC: 28.1 PG
MCHC RBC-ENTMCNC: 31.7 GM/DL
MCV RBC AUTO: 88.4 FL
MONOCYTES # BLD AUTO: 0.65 K/UL
MONOCYTES NFR BLD AUTO: 12.3 %
NEUTROPHILS # BLD AUTO: 3.21 K/UL
NEUTROPHILS NFR BLD AUTO: 60.6 %
NONHDLC SERPL-MCNC: 76 MG/DL
PLATELET # BLD AUTO: 192 K/UL
POTASSIUM SERPL-SCNC: 4.5 MMOL/L
PROT SERPL-MCNC: 7.5 G/DL
PT BLD: 21.6 SEC
RBC # BLD: 4.49 M/UL
RBC # FLD: 14.2 %
SODIUM SERPL-SCNC: 141 MMOL/L
TRIGL SERPL-MCNC: 57 MG/DL
TSH SERPL-ACNC: 1.46 UIU/ML
WBC # FLD AUTO: 5.29 K/UL

## 2021-03-09 RX ORDER — ALCOHOL ANTISEPTIC PADS
PADS, MEDICATED (EA) TOPICAL
Qty: 90 | Refills: 1 | Status: ACTIVE | COMMUNITY
Start: 2019-03-11 | End: 1900-01-01

## 2021-03-16 ENCOUNTER — LABORATORY RESULT (OUTPATIENT)
Age: 51
End: 2021-03-16

## 2021-03-17 ENCOUNTER — APPOINTMENT (OUTPATIENT)
Dept: BARIATRICS/WEIGHT MGMT | Facility: CLINIC | Age: 51
End: 2021-03-17
Payer: MEDICARE

## 2021-03-17 PROCEDURE — 99442: CPT | Mod: 95

## 2021-03-22 ENCOUNTER — INPATIENT (INPATIENT)
Facility: HOSPITAL | Age: 51
LOS: 2 days | Discharge: HOME | End: 2021-03-25
Attending: INTERNAL MEDICINE | Admitting: INTERNAL MEDICINE
Payer: MEDICARE

## 2021-03-22 VITALS
TEMPERATURE: 99 F | DIASTOLIC BLOOD PRESSURE: 86 MMHG | HEART RATE: 100 BPM | WEIGHT: 235.01 LBS | OXYGEN SATURATION: 99 % | SYSTOLIC BLOOD PRESSURE: 138 MMHG | HEIGHT: 64 IN | RESPIRATION RATE: 16 BRPM

## 2021-03-22 DIAGNOSIS — Z98.890 OTHER SPECIFIED POSTPROCEDURAL STATES: Chronic | ICD-10-CM

## 2021-03-22 DIAGNOSIS — Z90.721 ACQUIRED ABSENCE OF OVARIES, UNILATERAL: Chronic | ICD-10-CM

## 2021-03-22 LAB
ALBUMIN SERPL ELPH-MCNC: 3.3 G/DL — SIGNIFICANT CHANGE UP (ref 3.3–5)
ALP SERPL-CCNC: 58 U/L — SIGNIFICANT CHANGE UP (ref 40–120)
ALT FLD-CCNC: 30 U/L — SIGNIFICANT CHANGE UP (ref 12–78)
ANION GAP SERPL CALC-SCNC: 3 MMOL/L — LOW (ref 5–17)
APTT BLD: 36 SEC — HIGH (ref 27.5–35.5)
AST SERPL-CCNC: 22 U/L — SIGNIFICANT CHANGE UP (ref 15–37)
BASOPHILS # BLD AUTO: 0.03 K/UL — SIGNIFICANT CHANGE UP (ref 0–0.2)
BASOPHILS NFR BLD AUTO: 0.6 % — SIGNIFICANT CHANGE UP (ref 0–2)
BILIRUB SERPL-MCNC: 0.3 MG/DL — SIGNIFICANT CHANGE UP (ref 0.2–1.2)
BUN SERPL-MCNC: 18 MG/DL — SIGNIFICANT CHANGE UP (ref 7–23)
CALCIUM SERPL-MCNC: 8.2 MG/DL — LOW (ref 8.5–10.1)
CHLORIDE SERPL-SCNC: 106 MMOL/L — SIGNIFICANT CHANGE UP (ref 96–108)
CO2 SERPL-SCNC: 30 MMOL/L — SIGNIFICANT CHANGE UP (ref 22–31)
CREAT SERPL-MCNC: 0.65 MG/DL — SIGNIFICANT CHANGE UP (ref 0.5–1.3)
EOSINOPHIL # BLD AUTO: 0.04 K/UL — SIGNIFICANT CHANGE UP (ref 0–0.5)
EOSINOPHIL NFR BLD AUTO: 0.7 % — SIGNIFICANT CHANGE UP (ref 0–6)
FLUAV AG NPH QL: SIGNIFICANT CHANGE UP
FLUBV AG NPH QL: SIGNIFICANT CHANGE UP
GLUCOSE SERPL-MCNC: 88 MG/DL — SIGNIFICANT CHANGE UP (ref 70–99)
HCT VFR BLD CALC: 37.3 % — SIGNIFICANT CHANGE UP (ref 34.5–45)
HGB BLD-MCNC: 12 G/DL — SIGNIFICANT CHANGE UP (ref 11.5–15.5)
IMM GRANULOCYTES NFR BLD AUTO: 0 % — SIGNIFICANT CHANGE UP (ref 0–1.5)
INR BLD: 2.03 RATIO — HIGH (ref 0.88–1.16)
LYMPHOCYTES # BLD AUTO: 0.94 K/UL — LOW (ref 1–3.3)
LYMPHOCYTES # BLD AUTO: 17.4 % — SIGNIFICANT CHANGE UP (ref 13–44)
MCHC RBC-ENTMCNC: 27.8 PG — SIGNIFICANT CHANGE UP (ref 27–34)
MCHC RBC-ENTMCNC: 32.2 GM/DL — SIGNIFICANT CHANGE UP (ref 32–36)
MCV RBC AUTO: 86.5 FL — SIGNIFICANT CHANGE UP (ref 80–100)
MONOCYTES # BLD AUTO: 0.6 K/UL — SIGNIFICANT CHANGE UP (ref 0–0.9)
MONOCYTES NFR BLD AUTO: 11.1 % — SIGNIFICANT CHANGE UP (ref 2–14)
NEUTROPHILS # BLD AUTO: 3.8 K/UL — SIGNIFICANT CHANGE UP (ref 1.8–7.4)
NEUTROPHILS NFR BLD AUTO: 70.2 % — SIGNIFICANT CHANGE UP (ref 43–77)
NRBC # BLD: 0 /100 WBCS — SIGNIFICANT CHANGE UP (ref 0–0)
PLATELET # BLD AUTO: 175 K/UL — SIGNIFICANT CHANGE UP (ref 150–400)
POTASSIUM SERPL-MCNC: 3.9 MMOL/L — SIGNIFICANT CHANGE UP (ref 3.5–5.3)
POTASSIUM SERPL-SCNC: 3.9 MMOL/L — SIGNIFICANT CHANGE UP (ref 3.5–5.3)
PROT SERPL-MCNC: 7.3 GM/DL — SIGNIFICANT CHANGE UP (ref 6–8.3)
PROTHROM AB SERPL-ACNC: 22.8 SEC — HIGH (ref 10.6–13.6)
RBC # BLD: 4.31 M/UL — SIGNIFICANT CHANGE UP (ref 3.8–5.2)
RBC # FLD: 14 % — SIGNIFICANT CHANGE UP (ref 10.3–14.5)
SARS-COV-2 RNA SPEC QL NAA+PROBE: SIGNIFICANT CHANGE UP
SODIUM SERPL-SCNC: 139 MMOL/L — SIGNIFICANT CHANGE UP (ref 135–145)
WBC # BLD: 5.41 K/UL — SIGNIFICANT CHANGE UP (ref 3.8–10.5)
WBC # FLD AUTO: 5.41 K/UL — SIGNIFICANT CHANGE UP (ref 3.8–10.5)

## 2021-03-22 PROCEDURE — 99219: CPT

## 2021-03-22 PROCEDURE — 99285 EMERGENCY DEPT VISIT HI MDM: CPT

## 2021-03-22 RX ORDER — ATORVASTATIN CALCIUM 80 MG/1
40 TABLET, FILM COATED ORAL AT BEDTIME
Refills: 0 | Status: DISCONTINUED | OUTPATIENT
Start: 2021-03-22 | End: 2021-03-23

## 2021-03-22 RX ORDER — MECLIZINE HCL 12.5 MG
25 TABLET ORAL THREE TIMES A DAY
Refills: 0 | Status: DISCONTINUED | OUTPATIENT
Start: 2021-03-22 | End: 2021-03-23

## 2021-03-22 RX ORDER — FERROUS SULFATE 325(65) MG
325 TABLET ORAL THREE TIMES A DAY
Refills: 0 | Status: DISCONTINUED | OUTPATIENT
Start: 2021-03-22 | End: 2021-03-23

## 2021-03-22 RX ORDER — PREGABALIN 225 MG/1
1 CAPSULE ORAL
Qty: 0 | Refills: 0 | DISCHARGE

## 2021-03-22 RX ORDER — FUROSEMIDE 40 MG
1 TABLET ORAL
Qty: 0 | Refills: 0 | DISCHARGE

## 2021-03-22 RX ORDER — LEVOTHYROXINE SODIUM 125 MCG
137 TABLET ORAL DAILY
Refills: 0 | Status: DISCONTINUED | OUTPATIENT
Start: 2021-03-22 | End: 2021-03-23

## 2021-03-22 RX ORDER — DULOXETINE HYDROCHLORIDE 30 MG/1
1 CAPSULE, DELAYED RELEASE ORAL
Qty: 0 | Refills: 0 | DISCHARGE

## 2021-03-22 RX ORDER — FERROUS SULFATE 325(65) MG
1 TABLET ORAL
Qty: 0 | Refills: 0 | DISCHARGE

## 2021-03-22 RX ORDER — WARFARIN SODIUM 2.5 MG/1
1 TABLET ORAL
Qty: 0 | Refills: 0 | DISCHARGE

## 2021-03-22 RX ORDER — PYRIDOXINE HCL (VITAMIN B6) 100 MG
1 TABLET ORAL
Qty: 0 | Refills: 0 | DISCHARGE

## 2021-03-22 RX ORDER — ATORVASTATIN CALCIUM 80 MG/1
1 TABLET, FILM COATED ORAL
Qty: 0 | Refills: 0 | DISCHARGE

## 2021-03-22 RX ORDER — OXYCODONE AND ACETAMINOPHEN 5; 325 MG/1; MG/1
1 TABLET ORAL ONCE
Refills: 0 | Status: DISCONTINUED | OUTPATIENT
Start: 2021-03-22 | End: 2021-03-22

## 2021-03-22 RX ORDER — ACETAMINOPHEN 500 MG
650 TABLET ORAL EVERY 6 HOURS
Refills: 0 | Status: DISCONTINUED | OUTPATIENT
Start: 2021-03-22 | End: 2021-03-23

## 2021-03-22 RX ORDER — PREGABALIN 225 MG/1
1000 CAPSULE ORAL DAILY
Refills: 0 | Status: DISCONTINUED | OUTPATIENT
Start: 2021-03-22 | End: 2021-03-23

## 2021-03-22 RX ORDER — LEVOTHYROXINE SODIUM 125 MCG
1 TABLET ORAL
Qty: 0 | Refills: 0 | DISCHARGE

## 2021-03-22 RX ORDER — DULOXETINE HYDROCHLORIDE 30 MG/1
60 CAPSULE, DELAYED RELEASE ORAL DAILY
Refills: 0 | Status: DISCONTINUED | OUTPATIENT
Start: 2021-03-22 | End: 2021-03-23

## 2021-03-22 RX ORDER — FOLIC ACID 0.8 MG
1 TABLET ORAL DAILY
Refills: 0 | Status: DISCONTINUED | OUTPATIENT
Start: 2021-03-22 | End: 2021-03-23

## 2021-03-22 RX ORDER — BUPROPION HYDROCHLORIDE 150 MG/1
2 TABLET, EXTENDED RELEASE ORAL
Qty: 0 | Refills: 0 | DISCHARGE

## 2021-03-22 RX ORDER — LIRAGLUTIDE 6 MG/ML
1 INJECTION SUBCUTANEOUS
Qty: 0 | Refills: 0 | DISCHARGE

## 2021-03-22 RX ORDER — MECLIZINE HCL 12.5 MG
1 TABLET ORAL
Qty: 0 | Refills: 0 | DISCHARGE

## 2021-03-22 RX ORDER — FOLIC ACID 0.8 MG
1 TABLET ORAL
Qty: 0 | Refills: 0 | DISCHARGE

## 2021-03-22 RX ADMIN — Medication 25 MILLIGRAM(S): at 22:30

## 2021-03-22 RX ADMIN — Medication 325 MILLIGRAM(S): at 22:30

## 2021-03-22 RX ADMIN — Medication 75 MILLIGRAM(S): at 22:30

## 2021-03-22 RX ADMIN — ATORVASTATIN CALCIUM 40 MILLIGRAM(S): 80 TABLET, FILM COATED ORAL at 22:30

## 2021-03-22 RX ADMIN — OXYCODONE AND ACETAMINOPHEN 1 TABLET(S): 5; 325 TABLET ORAL at 22:30

## 2021-03-22 NOTE — ED ADULT NURSE NOTE - OBJECTIVE STATEMENT
50 year female c/o over dosing of warfarin this morning. Patient states taking 6 or 8 pills  of 7.5 mg warfarin. Patient was distracted this morning at 11:45 while taking medication. Patient c/o feelings weak, lightheaded. Patient denies LOC or injury, no observed bleeding. PT/INR done 1 week ago at 1.8

## 2021-03-22 NOTE — H&P ADULT - NSHPLABSRESULTS_GEN_ALL_CORE
T(C): 36.9 (03-22-21 @ 19:51), Max: 37 (03-22-21 @ 14:51)  HR: 85 (03-22-21 @ 19:51) (85 - 100)  BP: 103/64 (03-22-21 @ 19:51) (103/64 - 138/86)  RR: 16 (03-22-21 @ 19:51) (16 - 16)  SpO2: 95% (03-22-21 @ 19:51) (95% - 99%)                        12.0   5.41  )-----------( 175      ( 22 Mar 2021 15:44 )             37.3     03-22    139  |  106  |  18  ----------------------------<  88  3.9   |  30  |  0.65    Ca    8.2<L>      22 Mar 2021 15:44    TPro  7.3  /  Alb  3.3  /  TBili  0.3  /  DBili  x   /  AST  22  /  ALT  30  /  AlkPhos  58  03-22    LIVER FUNCTIONS - ( 22 Mar 2021 15:44 )  Alb: 3.3 g/dL / Pro: 7.3 gm/dL / ALK PHOS: 58 U/L / ALT: 30 U/L / AST: 22 U/L / GGT: x           PT/INR - ( 22 Mar 2021 15:44 )   PT: 22.8 sec;   INR: 2.03 ratio         PTT - ( 22 Mar 2021 15:44 )  PTT:36.0 sec        acetaminophen   Tablet .. 650 milliGRAM(s) Oral every 6 hours PRN  atorvastatin 40 milliGRAM(s) Oral at bedtime  cyanocobalamin 1000 MICROGram(s) Oral daily  DULoxetine 60 milliGRAM(s) Oral daily  enalapril 2.5 milliGRAM(s) Oral daily  ferrous    sulfate 325 milliGRAM(s) Oral three times a day  folic acid 1 milliGRAM(s) Oral daily  levothyroxine 137 MICROGram(s) Oral daily  meclizine 25 milliGRAM(s) Oral three times a day  pregabalin 75 milliGRAM(s) Oral three times a day

## 2021-03-22 NOTE — ED PROVIDER NOTE - PHYSICAL EXAMINATION
GEN: Awake, alert, interactive, NAD.  HEAD AND NECK: NC/AT. Airway patent. Neck supple.   EYES:  Clear b/l. EOMI. PERRL.   ENT: Moist mucus membranes.   CARDIAC: Regular rate, regular rhythm. No evident pedal edema.    RESP/CHEST: Normal respiratory effort with no use of accessory muscles or retractions. Clear throughout on auscultation.  ABD: Soft, non-distended, non-tender. No rebound, no guarding.   BACK: No midline spinal TTP. No CVAT.   EXTREMITIES: Moving all extremities with no apparent deformities. + BL varicose veins.   SKIN: Warm, dry, intact normal color. No rash.   NEURO: AOx3, CN II-XII grossly intact, no focal deficits.   PSYCH: Appropriate mood and affect.

## 2021-03-22 NOTE — H&P ADULT - NSICDXPASTSURGICALHX_GEN_ALL_CORE_FT
PAST SURGICAL HISTORY:  History of oophorectomy, unilateral right 1999    S/P D&C (status post dilation and curettage)

## 2021-03-22 NOTE — H&P ADULT - HISTORY OF PRESENT ILLNESS
51 y/o female w/ PMHx of HTN, HLD, Hypothyroidism, Diet-controlled, DM type 2 DVT/PE on Warfarin presents to the ED after accidentally taking too much Warfarin. Pt reports she is on "alot" of medications and typically places/distributes them in an empty pill bottle by time of day. Pt reports she typically takes off the medication sticker to identify the bottle. Pt reports this afternoon while distracted, accidently took about 6-8 pills left in her warfarin pill bottle without looking to ensure the bottle had no sticker. Pt reports she currently drowsy otherwise feels well. Pt denies any bleeding.    In ED vitals stable, INR 2.03, rest of labs stable. Poison control called; recommended q12 INR for about 24-48 hrs.

## 2021-03-22 NOTE — ED ADULT NURSE NOTE - ED STAT RN HANDOFF DETAILS 2
Report received from DESIREE Flores at 7pm. Assessment available on [facility]. will continue to monitor

## 2021-03-22 NOTE — ED PROVIDER NOTE - PROGRESS NOTE DETAILS
Spoke w/ heme and poison control: monitor closely x 2 days / q12 checks, outpt or inpt, no prophy K+, if inr > 5 small vit K+, ffp if bleeding. pt disabled, nurse sent to home for inr checks monthly, recently every wk d/t last inr 1.8, will admit to obs for q12 checks

## 2021-03-22 NOTE — H&P ADULT - ASSESSMENT
49 y/o female w/ PMHx of HTN, HLD, DVT/PE on Warfarin presents to the ED after accidentally taking too much Warfarin. Pt placed in observation for continued INR monitoring.    Accidental Warfarin overdose  - check INR q12  - hold warfarin  - monitor for bleeding  - Consider Vit K if INR > 5    DVT/PE  - Hold Warfarin    HTN, HLD, Hypothyroidism   - c/w home meds

## 2021-03-22 NOTE — ED PROVIDER NOTE - OBJECTIVE STATEMENT
49yo F w/ PMH HTN, DM, neuropathy, PE on coumadin presents to ED for accidental overdose. Pt states she accidentally took 6-8 tablets of coumadin for her afternoon dose. Pt states she makes bottles w/ her mixed daily meds, accidentally took bottle of all coumadin rather than afternoon medication mixed bottle b/c she was distracted talking on the phone. Each tablet is 7.5mg. Denies headache, blurred vision, SOB, CP, bleeding, skin changes. Pt states she feels well.

## 2021-03-22 NOTE — H&P ADULT - NSHPPHYSICALEXAM_GEN_ALL_CORE
PHYSICAL EXAM:    Vital Signs Last 24 Hrs  T(C): 37 (22 Mar 2021 14:51), Max: 37 (22 Mar 2021 14:51)  T(F): 98.6 (22 Mar 2021 14:51), Max: 98.6 (22 Mar 2021 14:51)  HR: 100 (22 Mar 2021 14:51) (100 - 100)  BP: 138/86 (22 Mar 2021 14:51) (138/86 - 138/86)  BP(mean): --  RR: 16 (22 Mar 2021 14:51) (16 - 16)  SpO2: 99% (22 Mar 2021 14:51) (99% - 99%)    GENERAL: Pt lying in bed comfortably in NAD  HEENT:  Atraumatic, EOMI, PERRL, conjunctiva and sclera clear, MMM  NECK: Supple, No JVD  CHEST/LUNG: Clear to auscultation bilaterally; No rales, rhonchi, wheezing or rubs. Unlabored respirations  HEART: Regular rate and rhythm; No murmurs, rubs, or gallops  ABDOMEN: Bowel sounds present; Soft, Nontender, Nondistended. No guarding or rigidity    EXTREMITIES:  2+ Peripheral Pulses, brisk capillary refill. No clubbing, cyanosis, or edema  NEUROLOGICAL:  Alert & Oriented X3, speech clear. Answers questions appropriately. Full and equal strength B/L upper and lower extremities. No deficits   MSK: FROM x 4 extremities   SKIN: No rashes or lesions

## 2021-03-22 NOTE — ED PROVIDER NOTE - CLINICAL SUMMARY MEDICAL DECISION MAKING FREE TEXT BOX
49yo F w/ PMH HTN, DM, neuropathy, PE on coumadin presents to ED for accidental overdose of her coumadin. AFVSS. Pt well appearing, in NAD, no evidence bleeding. Plan: Obtain CBC, CMP, PT/PTT/INR. Consult Poison Control / Heme. CBC, CMP w/o significant abnormalities, INR 2.03. Poison Control / Heme recommend Q12 INR checks, inpatient or outpatient if pt reliable. Pt disabled and has visiting nurse come to home Q weekly for INR checks. Will admit to Obs (d/w Dr Taylor). Pt understands and agrees w/ this plan.

## 2021-03-23 DIAGNOSIS — T45.511A POISONING BY ANTICOAGULANTS, ACCIDENTAL (UNINTENTIONAL), INITIAL ENCOUNTER: ICD-10-CM

## 2021-03-23 LAB
ANION GAP SERPL CALC-SCNC: 4 MMOL/L — LOW (ref 5–17)
BUN SERPL-MCNC: 17 MG/DL — SIGNIFICANT CHANGE UP (ref 7–23)
CALCIUM SERPL-MCNC: 8.4 MG/DL — LOW (ref 8.5–10.1)
CHLORIDE SERPL-SCNC: 107 MMOL/L — SIGNIFICANT CHANGE UP (ref 96–108)
CO2 SERPL-SCNC: 29 MMOL/L — SIGNIFICANT CHANGE UP (ref 22–31)
CREAT SERPL-MCNC: 0.48 MG/DL — LOW (ref 0.5–1.3)
GLUCOSE SERPL-MCNC: 80 MG/DL — SIGNIFICANT CHANGE UP (ref 70–99)
HCT VFR BLD CALC: 40.8 % — SIGNIFICANT CHANGE UP (ref 34.5–45)
HGB BLD-MCNC: 12.6 G/DL — SIGNIFICANT CHANGE UP (ref 11.5–15.5)
INR BLD: 3.31 RATIO — HIGH (ref 0.88–1.16)
INR BLD: 4.99 RATIO — HIGH (ref 0.88–1.16)
MAGNESIUM SERPL-MCNC: 2.3 MG/DL — SIGNIFICANT CHANGE UP (ref 1.6–2.6)
MCHC RBC-ENTMCNC: 27.6 PG — SIGNIFICANT CHANGE UP (ref 27–34)
MCHC RBC-ENTMCNC: 30.9 GM/DL — LOW (ref 32–36)
MCV RBC AUTO: 89.5 FL — SIGNIFICANT CHANGE UP (ref 80–100)
NRBC # BLD: 0 /100 WBCS — SIGNIFICANT CHANGE UP (ref 0–0)
PHOSPHATE SERPL-MCNC: 3 MG/DL — SIGNIFICANT CHANGE UP (ref 2.5–4.5)
PLATELET # BLD AUTO: 204 K/UL — SIGNIFICANT CHANGE UP (ref 150–400)
POTASSIUM SERPL-MCNC: 3.9 MMOL/L — SIGNIFICANT CHANGE UP (ref 3.5–5.3)
POTASSIUM SERPL-SCNC: 3.9 MMOL/L — SIGNIFICANT CHANGE UP (ref 3.5–5.3)
PROTHROM AB SERPL-ACNC: 36.3 SEC — HIGH (ref 10.6–13.6)
PROTHROM AB SERPL-ACNC: 53.6 SEC — HIGH (ref 10.6–13.6)
RBC # BLD: 4.56 M/UL — SIGNIFICANT CHANGE UP (ref 3.8–5.2)
RBC # FLD: 14.2 % — SIGNIFICANT CHANGE UP (ref 10.3–14.5)
SODIUM SERPL-SCNC: 140 MMOL/L — SIGNIFICANT CHANGE UP (ref 135–145)
WBC # BLD: 4.69 K/UL — SIGNIFICANT CHANGE UP (ref 3.8–10.5)
WBC # FLD AUTO: 4.69 K/UL — SIGNIFICANT CHANGE UP (ref 3.8–10.5)

## 2021-03-23 PROCEDURE — 99236 HOSP IP/OBS SAME DATE HI 85: CPT

## 2021-03-23 RX ORDER — PHYTONADIONE (VIT K1) 5 MG
2.5 TABLET ORAL ONCE
Refills: 0 | Status: COMPLETED | OUTPATIENT
Start: 2021-03-23 | End: 2021-03-23

## 2021-03-23 RX ORDER — OXYCODONE AND ACETAMINOPHEN 5; 325 MG/1; MG/1
1 TABLET ORAL ONCE
Refills: 0 | Status: DISCONTINUED | OUTPATIENT
Start: 2021-03-23 | End: 2021-03-23

## 2021-03-23 RX ADMIN — Medication 2.5 MILLIGRAM(S): at 18:53

## 2021-03-23 RX ADMIN — Medication 1 MILLIGRAM(S): at 12:29

## 2021-03-23 RX ADMIN — OXYCODONE AND ACETAMINOPHEN 1 TABLET(S): 5; 325 TABLET ORAL at 23:00

## 2021-03-23 RX ADMIN — PREGABALIN 1000 MICROGRAM(S): 225 CAPSULE ORAL at 12:28

## 2021-03-23 RX ADMIN — ATORVASTATIN CALCIUM 40 MILLIGRAM(S): 80 TABLET, FILM COATED ORAL at 21:06

## 2021-03-23 RX ADMIN — OXYCODONE AND ACETAMINOPHEN 1 TABLET(S): 5; 325 TABLET ORAL at 21:56

## 2021-03-23 RX ADMIN — Medication 325 MILLIGRAM(S): at 14:26

## 2021-03-23 RX ADMIN — Medication 75 MILLIGRAM(S): at 21:06

## 2021-03-23 RX ADMIN — Medication 650 MILLIGRAM(S): at 14:27

## 2021-03-23 RX ADMIN — Medication 137 MICROGRAM(S): at 06:28

## 2021-03-23 RX ADMIN — Medication 75 MILLIGRAM(S): at 06:25

## 2021-03-23 RX ADMIN — Medication 25 MILLIGRAM(S): at 21:06

## 2021-03-23 RX ADMIN — Medication 25 MILLIGRAM(S): at 06:27

## 2021-03-23 RX ADMIN — Medication 325 MILLIGRAM(S): at 21:06

## 2021-03-23 RX ADMIN — Medication 25 MILLIGRAM(S): at 14:51

## 2021-03-23 RX ADMIN — Medication 325 MILLIGRAM(S): at 06:25

## 2021-03-23 RX ADMIN — Medication 75 MILLIGRAM(S): at 14:26

## 2021-03-23 RX ADMIN — DULOXETINE HYDROCHLORIDE 60 MILLIGRAM(S): 30 CAPSULE, DELAYED RELEASE ORAL at 12:29

## 2021-03-23 RX ADMIN — Medication 650 MILLIGRAM(S): at 15:27

## 2021-03-23 NOTE — PROGRESS NOTE ADULT - ASSESSMENT
51 y/o female w/ PMHx of HTN, HLD, DVT/PE on Warfarin presents to the ED after accidentally taking too much Warfarin. Pt placed in observation for continued INR monitoring.    Accidental Warfarin overdose  - check INR q12, need to watch 3-4 days  - holding warfarin  - monitor for bleeding  - INR 4.99, will dose vit K 2.5     DVT/PE  - Hold Warfarin    HTN, HLD, Hypothyroidism   - c/w home meds 51 y/o female w/ PMHx of HTN, HLD, DVT/PE on Warfarin presents to the ED after accidentally taking too much Warfarin. Pt placed in observation for continued INR monitoring.    Accidental Warfarin overdose  - check INR q12, need to watch 3-4 days  - holding warfarin  - monitor for bleeding  - INR 4.99, will dose vit K 2.5     DVT/PE  - Hold Warfarin    HTN, HLD, Hypothyroidism   - c/w home meds    Pt is interested in getting the COVID vaccine prior dc.

## 2021-03-23 NOTE — CONSULT NOTE ADULT - PROBLEM SELECTOR RECOMMENDATION 9
- patient should be watched for at least 3-4 days, Q12 INR checks  - watch for bleeding, patient took at least 6-7 tabs of 7.5mg of coumadin  - patient claims accidental overdose  - was on xarelto in past and claims PE on Xarelto

## 2021-03-23 NOTE — CONSULT NOTE ADULT - SUBJECTIVE AND OBJECTIVE BOX
Reason for Consultation: Coumadin Overdose    HPI: Patient is a 50y Female seen on consultatioin for the evaluation and management of coumadin overdose     49 y/o female w/ PMHx of HTN, HLD, Hypothyroidism, Diet-controlled, DM type 2 DVT/PE on Warfarin presents to the ED after accidentally taking too much Warfarin. Pt reports she is on "alot" of medications and typically places/distributes them in an empty pill bottle by time of day. Pt reports she typically takes off the medication sticker to identify the bottle. Pt reports this afternoon while distracted, accidently took about 6-8 pills left in her warfarin pill bottle without looking to ensure the bottle had no sticker. Pt reports she currently drowsy otherwise feels well. Pt denies any bleeding.    In ED vitals stable, INR 2.03, rest of labs stable. Poison control called;    Was on xarelto previosuly but had recurrent PE on That    PAST MEDICAL & SURGICAL HISTORY:  History of DVT (deep vein thrombosis)  2014    History of pulmonary embolism  2014 x 2 -1 in march, 1 in July - on coumadin    Diabetic peripheral neuropathy  severe    Iron deficiency anemia    UTI (urinary tract infection)  9/1/17 positive urine cx, was treatead with  pencillin from 9/8/17-9/15/17    Morbid obesity with BMI of 60.0-69.9, adult  BMI 62    Menorrhagia  improved    Osteoarthritis    Hyperlipidemia    YVONNE on CPAP  severe, not using CPAP    Type 2 diabetes mellitus  Hg A1C 4.9 %  ( 9/12/17), diagnosed in 2011    Hypertension    GERD (gastroesophageal reflux disease)    Vertigo  chronic, without changes. Patient was evaluated in the past, etiology unknown.    Hypothyroidism    History of oophorectomy, unilateral  right 1999    S/P D&amp;C (status post dilation and curettage)        MEDICATIONS  (STANDING):  atorvastatin 40 milliGRAM(s) Oral at bedtime  cyanocobalamin 1000 MICROGram(s) Oral daily  DULoxetine 60 milliGRAM(s) Oral daily  enalapril 2.5 milliGRAM(s) Oral daily  ferrous    sulfate 325 milliGRAM(s) Oral three times a day  folic acid 1 milliGRAM(s) Oral daily  levothyroxine 137 MICROGram(s) Oral daily  meclizine 25 milliGRAM(s) Oral three times a day  pregabalin 75 milliGRAM(s) Oral three times a day    MEDICATIONS  (PRN):  acetaminophen   Tablet .. 650 milliGRAM(s) Oral every 6 hours PRN Temp greater or equal to 38C (100.4F), Mild Pain (1 - 3)      Allergies    No Known Allergies    Intolerances        SOCIAL HISTORY:    Smoking Status: no  Alcohol: no  Marital Status: no      FAMILY HISTORY:  No pertinent family history in first degree relatives          Vital Signs Last 24 Hrs  T(C): 36.5 (23 Mar 2021 04:50), Max: 37 (22 Mar 2021 14:51)  T(F): 97.7 (23 Mar 2021 04:50), Max: 98.6 (22 Mar 2021 14:51)  HR: 73 (23 Mar 2021 04:50) (73 - 100)  BP: 96/63 (23 Mar 2021 04:50) (96/63 - 138/86)  BP(mean): --  RR: 17 (23 Mar 2021 04:50) (16 - 18)  SpO2: 98% (23 Mar 2021 04:50) (95% - 99%)    PHYSICAL EXAM:    general - AAO x 3  HEENT - No Icterus  CVS - RRR  RS - AE B/L  Abd - soft, NT  Ext - Pulses +        LABS:                        12.6   4.69  )-----------( 204      ( 23 Mar 2021 09:35 )             40.8     03-22    139  |  106  |  18  ----------------------------<  88  3.9   |  30  |  0.65    Ca    8.2<L>      22 Mar 2021 15:44    TPro  7.3  /  Alb  3.3  /  TBili  0.3  /  DBili  x   /  AST  22  /  ALT  30  /  AlkPhos  58  03-22    PT/INR - ( 22 Mar 2021 15:44 )   PT: 22.8 sec;   INR: 2.03 ratio         PTT - ( 22 Mar 2021 15:44 )  PTT:36.0 sec        RADIOLOGY & ADDITIONAL STUDIES:

## 2021-03-24 LAB
ANION GAP SERPL CALC-SCNC: 0 MMOL/L — LOW (ref 5–17)
BUN SERPL-MCNC: 13 MG/DL — SIGNIFICANT CHANGE UP (ref 7–23)
CALCIUM SERPL-MCNC: 8 MG/DL — LOW (ref 8.5–10.1)
CHLORIDE SERPL-SCNC: 108 MMOL/L — SIGNIFICANT CHANGE UP (ref 96–108)
CO2 SERPL-SCNC: 32 MMOL/L — HIGH (ref 22–31)
CREAT SERPL-MCNC: 0.36 MG/DL — LOW (ref 0.5–1.3)
GLUCOSE SERPL-MCNC: 76 MG/DL — SIGNIFICANT CHANGE UP (ref 70–99)
HCT VFR BLD CALC: 37.1 % — SIGNIFICANT CHANGE UP (ref 34.5–45)
HGB BLD-MCNC: 11.8 G/DL — SIGNIFICANT CHANGE UP (ref 11.5–15.5)
INR BLD: 3.8 RATIO — HIGH (ref 0.88–1.16)
MCHC RBC-ENTMCNC: 27.8 PG — SIGNIFICANT CHANGE UP (ref 27–34)
MCHC RBC-ENTMCNC: 31.8 GM/DL — LOW (ref 32–36)
MCV RBC AUTO: 87.3 FL — SIGNIFICANT CHANGE UP (ref 80–100)
NRBC # BLD: 0 /100 WBCS — SIGNIFICANT CHANGE UP (ref 0–0)
PLATELET # BLD AUTO: 167 K/UL — SIGNIFICANT CHANGE UP (ref 150–400)
POTASSIUM SERPL-MCNC: 3.9 MMOL/L — SIGNIFICANT CHANGE UP (ref 3.5–5.3)
POTASSIUM SERPL-SCNC: 3.9 MMOL/L — SIGNIFICANT CHANGE UP (ref 3.5–5.3)
PROTHROM AB SERPL-ACNC: 41.4 SEC — HIGH (ref 10.6–13.6)
RBC # BLD: 4.25 M/UL — SIGNIFICANT CHANGE UP (ref 3.8–5.2)
RBC # FLD: 14.1 % — SIGNIFICANT CHANGE UP (ref 10.3–14.5)
SODIUM SERPL-SCNC: 140 MMOL/L — SIGNIFICANT CHANGE UP (ref 135–145)
WBC # BLD: 4.52 K/UL — SIGNIFICANT CHANGE UP (ref 3.8–10.5)
WBC # FLD AUTO: 4.52 K/UL — SIGNIFICANT CHANGE UP (ref 3.8–10.5)

## 2021-03-24 PROCEDURE — 99233 SBSQ HOSP IP/OBS HIGH 50: CPT

## 2021-03-24 RX ORDER — LEVOTHYROXINE SODIUM 125 MCG
137 TABLET ORAL DAILY
Refills: 0 | Status: DISCONTINUED | OUTPATIENT
Start: 2021-03-24 | End: 2021-03-25

## 2021-03-24 RX ORDER — MECLIZINE HCL 12.5 MG
25 TABLET ORAL THREE TIMES A DAY
Refills: 0 | Status: DISCONTINUED | OUTPATIENT
Start: 2021-03-24 | End: 2021-03-25

## 2021-03-24 RX ORDER — JNJ-78436735 50000000000 [PFU]/.5ML
0.5 SUSPENSION INTRAMUSCULAR ONCE
Refills: 0 | Status: DISCONTINUED | OUTPATIENT
Start: 2021-03-24 | End: 2021-03-23

## 2021-03-24 RX ORDER — ACETAMINOPHEN 500 MG
650 TABLET ORAL EVERY 6 HOURS
Refills: 0 | Status: DISCONTINUED | OUTPATIENT
Start: 2021-03-24 | End: 2021-03-25

## 2021-03-24 RX ORDER — FERROUS SULFATE 325(65) MG
325 TABLET ORAL THREE TIMES A DAY
Refills: 0 | Status: DISCONTINUED | OUTPATIENT
Start: 2021-03-24 | End: 2021-03-25

## 2021-03-24 RX ORDER — OXYCODONE AND ACETAMINOPHEN 5; 325 MG/1; MG/1
1 TABLET ORAL EVERY 6 HOURS
Refills: 0 | Status: DISCONTINUED | OUTPATIENT
Start: 2021-03-24 | End: 2021-03-23

## 2021-03-24 RX ORDER — DULOXETINE HYDROCHLORIDE 30 MG/1
60 CAPSULE, DELAYED RELEASE ORAL DAILY
Refills: 0 | Status: DISCONTINUED | OUTPATIENT
Start: 2021-03-24 | End: 2021-03-25

## 2021-03-24 RX ORDER — ATORVASTATIN CALCIUM 80 MG/1
40 TABLET, FILM COATED ORAL AT BEDTIME
Refills: 0 | Status: DISCONTINUED | OUTPATIENT
Start: 2021-03-24 | End: 2021-03-25

## 2021-03-24 RX ORDER — OXYCODONE AND ACETAMINOPHEN 5; 325 MG/1; MG/1
1 TABLET ORAL EVERY 6 HOURS
Refills: 0 | Status: DISCONTINUED | OUTPATIENT
Start: 2021-03-24 | End: 2021-03-25

## 2021-03-24 RX ORDER — FOLIC ACID 0.8 MG
1 TABLET ORAL DAILY
Refills: 0 | Status: DISCONTINUED | OUTPATIENT
Start: 2021-03-24 | End: 2021-03-25

## 2021-03-24 RX ORDER — PREGABALIN 225 MG/1
1000 CAPSULE ORAL DAILY
Refills: 0 | Status: DISCONTINUED | OUTPATIENT
Start: 2021-03-24 | End: 2021-03-25

## 2021-03-24 RX ORDER — OXYCODONE AND ACETAMINOPHEN 5; 325 MG/1; MG/1
1 TABLET ORAL ONCE
Refills: 0 | Status: DISCONTINUED | OUTPATIENT
Start: 2021-03-24 | End: 2021-03-25

## 2021-03-24 RX ADMIN — PREGABALIN 1000 MICROGRAM(S): 225 CAPSULE ORAL at 12:09

## 2021-03-24 RX ADMIN — OXYCODONE AND ACETAMINOPHEN 1 TABLET(S): 5; 325 TABLET ORAL at 20:54

## 2021-03-24 RX ADMIN — Medication 75 MILLIGRAM(S): at 05:13

## 2021-03-24 RX ADMIN — Medication 75 MILLIGRAM(S): at 13:33

## 2021-03-24 RX ADMIN — Medication 325 MILLIGRAM(S): at 13:30

## 2021-03-24 RX ADMIN — Medication 1 MILLIGRAM(S): at 12:10

## 2021-03-24 RX ADMIN — Medication 325 MILLIGRAM(S): at 21:39

## 2021-03-24 RX ADMIN — DULOXETINE HYDROCHLORIDE 60 MILLIGRAM(S): 30 CAPSULE, DELAYED RELEASE ORAL at 12:10

## 2021-03-24 RX ADMIN — Medication 137 MICROGRAM(S): at 05:14

## 2021-03-24 RX ADMIN — OXYCODONE AND ACETAMINOPHEN 1 TABLET(S): 5; 325 TABLET ORAL at 19:49

## 2021-03-24 RX ADMIN — OXYCODONE AND ACETAMINOPHEN 1 TABLET(S): 5; 325 TABLET ORAL at 18:36

## 2021-03-24 RX ADMIN — Medication 25 MILLIGRAM(S): at 13:30

## 2021-03-24 RX ADMIN — ATORVASTATIN CALCIUM 40 MILLIGRAM(S): 80 TABLET, FILM COATED ORAL at 21:39

## 2021-03-24 RX ADMIN — OXYCODONE AND ACETAMINOPHEN 1 TABLET(S): 5; 325 TABLET ORAL at 13:30

## 2021-03-24 RX ADMIN — Medication 75 MILLIGRAM(S): at 21:40

## 2021-03-24 RX ADMIN — Medication 25 MILLIGRAM(S): at 05:15

## 2021-03-24 RX ADMIN — Medication 325 MILLIGRAM(S): at 05:14

## 2021-03-24 RX ADMIN — Medication 25 MILLIGRAM(S): at 21:39

## 2021-03-24 NOTE — PROGRESS NOTE ADULT - ASSESSMENT
49 y/o female w/ PMHx of HTN, HLD, DVT/PE on Warfarin presents to the ED after accidentally taking too much Warfarin. Pt placed in observation for continued INR monitoring.    Accidental Warfarin overdose  - INR still elevated. accidental overdose 2 days. heme following. follow INR in am. watch for any active gross bleeding.   cont to hold coumadin. discussed about NOAC (patient failed xarelto in 2014). Patient has PE in 3/31 and then again PE on xarelto in 7/2014.     DVT/PE  - Hold Warfarin    HTN, HLD, Hypothyroidism   - c/w home meds    Chronic bilateral leg pains. start percocet prn for pain.     COVID RAVENJ vaccine consent signed.     DVT ppx: on hold coumadin.

## 2021-03-25 ENCOUNTER — TRANSCRIPTION ENCOUNTER (OUTPATIENT)
Age: 51
End: 2021-03-25

## 2021-03-25 VITALS
SYSTOLIC BLOOD PRESSURE: 122 MMHG | HEART RATE: 99 BPM | TEMPERATURE: 98 F | RESPIRATION RATE: 18 BRPM | OXYGEN SATURATION: 96 % | DIASTOLIC BLOOD PRESSURE: 66 MMHG

## 2021-03-25 LAB
ALBUMIN SERPL ELPH-MCNC: 3 G/DL — LOW (ref 3.3–5)
ALP SERPL-CCNC: 60 U/L — SIGNIFICANT CHANGE UP (ref 40–120)
ALT FLD-CCNC: 29 U/L — SIGNIFICANT CHANGE UP (ref 12–78)
ANION GAP SERPL CALC-SCNC: 3 MMOL/L — LOW (ref 5–17)
AST SERPL-CCNC: 24 U/L — SIGNIFICANT CHANGE UP (ref 15–37)
BILIRUB SERPL-MCNC: 0.3 MG/DL — SIGNIFICANT CHANGE UP (ref 0.2–1.2)
BUN SERPL-MCNC: 11 MG/DL — SIGNIFICANT CHANGE UP (ref 7–23)
CALCIUM SERPL-MCNC: 8.1 MG/DL — LOW (ref 8.5–10.1)
CHLORIDE SERPL-SCNC: 109 MMOL/L — HIGH (ref 96–108)
CO2 SERPL-SCNC: 28 MMOL/L — SIGNIFICANT CHANGE UP (ref 22–31)
COVID-19 SPIKE DOMAIN AB INTERP: POSITIVE
COVID-19 SPIKE DOMAIN ANTIBODY RESULT: >250 U/ML — HIGH
CREAT SERPL-MCNC: 0.42 MG/DL — LOW (ref 0.5–1.3)
GLUCOSE SERPL-MCNC: 79 MG/DL — SIGNIFICANT CHANGE UP (ref 70–99)
HCT VFR BLD CALC: 39.5 % — SIGNIFICANT CHANGE UP (ref 34.5–45)
HGB BLD-MCNC: 12.1 G/DL — SIGNIFICANT CHANGE UP (ref 11.5–15.5)
INR BLD: 2.02 RATIO — HIGH (ref 0.88–1.16)
MCHC RBC-ENTMCNC: 27.5 PG — SIGNIFICANT CHANGE UP (ref 27–34)
MCHC RBC-ENTMCNC: 30.6 GM/DL — LOW (ref 32–36)
MCV RBC AUTO: 89.8 FL — SIGNIFICANT CHANGE UP (ref 80–100)
NRBC # BLD: 0 /100 WBCS — SIGNIFICANT CHANGE UP (ref 0–0)
PLATELET # BLD AUTO: 169 K/UL — SIGNIFICANT CHANGE UP (ref 150–400)
POTASSIUM SERPL-MCNC: 3.5 MMOL/L — SIGNIFICANT CHANGE UP (ref 3.5–5.3)
POTASSIUM SERPL-SCNC: 3.5 MMOL/L — SIGNIFICANT CHANGE UP (ref 3.5–5.3)
PROT SERPL-MCNC: 7.1 GM/DL — SIGNIFICANT CHANGE UP (ref 6–8.3)
PROTHROM AB SERPL-ACNC: 22.7 SEC — HIGH (ref 10.6–13.6)
RBC # BLD: 4.4 M/UL — SIGNIFICANT CHANGE UP (ref 3.8–5.2)
RBC # FLD: 14 % — SIGNIFICANT CHANGE UP (ref 10.3–14.5)
SARS-COV-2 IGG+IGM SERPL QL IA: >250 U/ML — HIGH
SARS-COV-2 IGG+IGM SERPL QL IA: POSITIVE
SODIUM SERPL-SCNC: 140 MMOL/L — SIGNIFICANT CHANGE UP (ref 135–145)
WBC # BLD: 4.79 K/UL — SIGNIFICANT CHANGE UP (ref 3.8–10.5)
WBC # FLD AUTO: 4.79 K/UL — SIGNIFICANT CHANGE UP (ref 3.8–10.5)

## 2021-03-25 PROCEDURE — 99239 HOSP IP/OBS DSCHRG MGMT >30: CPT

## 2021-03-25 RX ORDER — JNJ-78436735 50000000000 [PFU]/.5ML
0.5 SUSPENSION INTRAMUSCULAR ONCE
Refills: 0 | Status: COMPLETED | OUTPATIENT
Start: 2021-03-25 | End: 2021-03-25

## 2021-03-25 RX ADMIN — DULOXETINE HYDROCHLORIDE 60 MILLIGRAM(S): 30 CAPSULE, DELAYED RELEASE ORAL at 11:42

## 2021-03-25 RX ADMIN — Medication 25 MILLIGRAM(S): at 13:36

## 2021-03-25 RX ADMIN — OXYCODONE AND ACETAMINOPHEN 1 TABLET(S): 5; 325 TABLET ORAL at 12:32

## 2021-03-25 RX ADMIN — Medication 75 MILLIGRAM(S): at 06:16

## 2021-03-25 RX ADMIN — Medication 325 MILLIGRAM(S): at 13:36

## 2021-03-25 RX ADMIN — Medication 75 MILLIGRAM(S): at 13:36

## 2021-03-25 RX ADMIN — JNJ-78436735 0.5 MILLILITER(S): 50000000000 SUSPENSION INTRAMUSCULAR at 15:30

## 2021-03-25 RX ADMIN — Medication 25 MILLIGRAM(S): at 06:16

## 2021-03-25 RX ADMIN — PREGABALIN 1000 MICROGRAM(S): 225 CAPSULE ORAL at 11:42

## 2021-03-25 RX ADMIN — OXYCODONE AND ACETAMINOPHEN 1 TABLET(S): 5; 325 TABLET ORAL at 11:43

## 2021-03-25 RX ADMIN — Medication 137 MICROGRAM(S): at 06:16

## 2021-03-25 RX ADMIN — Medication 325 MILLIGRAM(S): at 06:16

## 2021-03-25 RX ADMIN — Medication 1 MILLIGRAM(S): at 11:43

## 2021-03-25 NOTE — PROGRESS NOTE ADULT - SUBJECTIVE AND OBJECTIVE BOX
CHIEF COMPLAINT: leg pains bilaerally chronic but worse without home medication  no chest pain  no n/v/d/fever  no active gross bleeding reported.       PHYSICAL EXAM:    GENERAL: Morbidly obese, no acute distress   CHEST/LUNG: Clear to ausculation bilaterally, no wheezing, no crackles   HEART: Regular rate and rhythm; No murmurs, rubs  ABDOMEN: Soft, Nontender, Nondistended; Bowel sounds present  EXTREMITIES:  Moving all four extremities spontaneously, No clubbing, cyanosis, or edema. + varicose veins bilateral legs  NERVOUS SYSTEM:  Grossly non focal.  Psychiatry: AAO x 3, mood is appropriate       OBJECTIVE DATA:   Vital Signs Last 24 Hrs  T(C): 36.3 (24 Mar 2021 10:59), Max: 36.6 (23 Mar 2021 16:02)  T(F): 97.3 (24 Mar 2021 10:59), Max: 97.9 (23 Mar 2021 16:02)  HR: 76 (24 Mar 2021 10:59) (76 - 82)  BP: 146/65 (24 Mar 2021 10:59) (98/68 - 146/65)  BP(mean): --  RR: 18 (24 Mar 2021 10:59) (17 - 18)  SpO2: 98% (24 Mar 2021 10:59) (96% - 99%)           Daily     Daily Weight in k.5 (24 Mar 2021 03:48)  LABS:                        11.8   4.52  )-----------( 167      ( 24 Mar 2021 07:12 )             37.1             03-24    140  |  108  |  13  ----------------------------<  76  3.9   |  32<H>  |  0.36<L>    Ca    8.0<L>      24 Mar 2021 07:12  Phos  3.0     03-23  Mg     2.3     03-23    TPro  7.3  /  Alb  3.3  /  TBili  0.3  /  DBili  x   /  AST  22  /  ALT  30  /  AlkPhos  58  03-22              PT/INR - ( 24 Mar 2021 07:12 )   PT: 41.4 sec;   INR: 3.80 ratio         PTT - ( 22 Mar 2021 15:44 )  PTT:36.0 sec             MEDICATIONS  (STANDING):  atorvastatin 40 milliGRAM(s) Oral at bedtime  coronavirus (EUA) Vaccine ([place]) 0.5 milliLiter(s) IntraMuscular once  cyanocobalamin 1000 MICROGram(s) Oral daily  DULoxetine 60 milliGRAM(s) Oral daily  enalapril 2.5 milliGRAM(s) Oral daily  ferrous    sulfate 325 milliGRAM(s) Oral three times a day  folic acid 1 milliGRAM(s) Oral daily  levothyroxine 137 MICROGram(s) Oral daily  meclizine 25 milliGRAM(s) Oral three times a day  pregabalin 75 milliGRAM(s) Oral three times a day    MEDICATIONS  (PRN):  acetaminophen   Tablet .. 650 milliGRAM(s) Oral every 6 hours PRN Temp greater or equal to 38C (100.4F), Mild Pain (1 - 3)  oxycodone    5 mG/acetaminophen 325 mG 1 Tablet(s) Oral every 6 hours PRN Severe Pain (7 - 10)      
Patient is a 50y old  Female who presents with a chief complaint of Accidental Warfarin overdose (23 Mar 2021 09:44)    INTERVAL HPI/OVERNIGHT EVENTS:  Pt was seen and examined, no acute events.    MEDICATIONS  (STANDING):  atorvastatin 40 milliGRAM(s) Oral at bedtime  cyanocobalamin 1000 MICROGram(s) Oral daily  DULoxetine 60 milliGRAM(s) Oral daily  enalapril 2.5 milliGRAM(s) Oral daily  ferrous    sulfate 325 milliGRAM(s) Oral three times a day  folic acid 1 milliGRAM(s) Oral daily  levothyroxine 137 MICROGram(s) Oral daily  meclizine 25 milliGRAM(s) Oral three times a day  pregabalin 75 milliGRAM(s) Oral three times a day    MEDICATIONS  (PRN):  acetaminophen   Tablet .. 650 milliGRAM(s) Oral every 6 hours PRN Temp greater or equal to 38C (100.4F), Mild Pain (1 - 3)      Allergies  No Known Allergies      Vital Signs Last 24 Hrs  T(C): 36.6 (23 Mar 2021 16:02), Max: 37.1 (23 Mar 2021 10:45)  T(F): 97.9 (23 Mar 2021 16:02), Max: 98.8 (23 Mar 2021 10:45)  HR: 77 (23 Mar 2021 16:02) (73 - 86)  BP: 125/74 (23 Mar 2021 16:02) (96/63 - 125/74)  BP(mean): --  RR: 17 (23 Mar 2021 16:02) (16 - 18)  SpO2: 97% (23 Mar 2021 16:02) (95% - 98%)      PHYSICAL EXAM:  GENERAL: NAD  HEAD:  Atraumatic   EYES: PERRLA  NERVOUS SYSTEM:  Awake, alert  CHEST/LUNG: Clear  HEART: RRR  ABDOMEN: Soft, non tender  EXTREMITIES:  no edema      LABS:                        12.6   4.69  )-----------( 204      ( 23 Mar 2021 09:35 )             40.8     03-23    140  |  107  |  17  ----------------------------<  80  3.9   |  29  |  0.48<L>    Ca    8.4<L>      23 Mar 2021 09:35  Phos  3.0     03-23  Mg     2.3     03-23    TPro  7.3  /  Alb  3.3  /  TBili  0.3  /  DBili  x   /  AST  22  /  ALT  30  /  AlkPhos  58  03-22    PT/INR - ( 23 Mar 2021 18:03 )   PT: 53.6 sec;   INR: 4.99 ratio         PTT - ( 22 Mar 2021 15:44 )  PTT:36.0 sec    CAPILLARY BLOOD GLUCOSE          RADIOLOGY & ADDITIONAL TESTS:    Imaging Personally Reviewed:  [ ] YES  [ ] NO    Consultant(s) Notes Reviewed:  [ ] YES  [ ] NO    Care Discussed with Consultants/Other Providers [ ] YES  [ ] NO
INR 2.02    Vital Signs Last 24 Hrs  T(C): 36.3 (25 Mar 2021 05:39), Max: 36.4 (24 Mar 2021 23:53)  T(F): 97.4 (25 Mar 2021 05:39), Max: 97.5 (24 Mar 2021 23:53)  HR: 70 (25 Mar 2021 05:39) (70 - 76)  BP: 108/70 (25 Mar 2021 05:39) (108/70 - 146/65)  BP(mean): --  RR: 18 (25 Mar 2021 05:39) (18 - 18)  SpO2: 99% (25 Mar 2021 05:39) (96% - 99%)    PHYSICAL EXAM:    general - AAO x 3  HEENT - No Icterus  CVS - RRR  RS - AE B/L  Abd - soft, NT  Ext - Pulses +        LABS:                        12.1   4.79  )-----------( 169      ( 25 Mar 2021 08:30 )             39.5     03-25    140  |  109<H>  |  11  ----------------------------<  79  3.5   |  28  |  0.42<L>    Ca    8.1<L>      25 Mar 2021 08:30    TPro  7.1  /  Alb  3.0<L>  /  TBili  0.3  /  DBili  x   /  AST  24  /  ALT  29  /  AlkPhos  60  03-25    PT/INR - ( 25 Mar 2021 08:30 )   PT: 22.7 sec;   INR: 2.02 ratio                 RADIOLOGY & ADDITIONAL STUDIES:  
no bleeding reported    Vital Signs Last 24 Hrs  T(C): 36.4 (24 Mar 2021 03:48), Max: 37.1 (23 Mar 2021 10:45)  T(F): 97.5 (24 Mar 2021 03:48), Max: 98.8 (23 Mar 2021 10:45)  HR: 80 (24 Mar 2021 03:48) (74 - 82)  BP: 100/61 (24 Mar 2021 03:48) (98/68 - 129/76)  BP(mean): --  RR: 18 (24 Mar 2021 03:48) (17 - 18)  SpO2: 98% (24 Mar 2021 03:48) (96% - 99%)    PHYSICAL EXAM:    general - AAO x 3  HEENT - No Icterus  CVS - RRR  RS - AE B/L  Abd - soft, NT  Ext - Pulses +        LABS:                        11.8   4.52  )-----------( 167      ( 24 Mar 2021 07:12 )             37.1     03-24    140  |  108  |  13  ----------------------------<  76  3.9   |  32<H>  |  0.36<L>    Ca    8.0<L>      24 Mar 2021 07:12  Phos  3.0     03-23  Mg     2.3     03-23    TPro  7.3  /  Alb  3.3  /  TBili  0.3  /  DBili  x   /  AST  22  /  ALT  30  /  AlkPhos  58  03-22    PT/INR - ( 24 Mar 2021 07:12 )   PT: 41.4 sec;   INR: 3.80 ratio         PTT - ( 22 Mar 2021 15:44 )  PTT:36.0 sec        RADIOLOGY & ADDITIONAL STUDIES:

## 2021-03-25 NOTE — DISCHARGE NOTE PROVIDER - HOSPITAL COURSE
49 y/o female w/ PMHx of HTN, HLD, DVT/PE on Warfarin presents to the ED after accidentally taking too much Warfarin. Pt placed in observation for continued INR monitoring.     Accidental Warfarin overdose  -improved. hematologist consulted. INR down to 2.2. no active bleeding. ok to dc home on home dose of coumadin. advised to be very careful about taking home meds properly (use pill box). outpatient INR check on MOnday is recommnended. discussed about bleeding risk with coumadin and risk of chest pain/sob with PE from subtherapeutic INR.     DVT/PE  - restart coumadin.     HTN, HLD, Hypothyroidism   - c/w home meds    Chronic bilateral leg pains. cont home med.     COVID JNJ vaccine consent signed.     Seen and examined by me today. Vitals stable.   I have discussed all the inpatient radiographic findings with the patient and stressed that patient follows with the PCP for further outpatient care. I have printed inpatient lab results and report of imaging studies and given these to the patient/family to help with further outpatient care with PCP.   All questions welcomed and answered appropriately. Patient verbalized understanding of post discharge physician's follows up and discharge instructions.   DC time spent by me excluding billable procedures 38 mins

## 2021-03-25 NOTE — DISCHARGE NOTE PROVIDER - NSDCFUSCHEDAPPT_GEN_ALL_CORE_FT
CONY HOUSTON ; 03/29/2021 ; NPP Med [address]Int [id number]  CONY HOUSTON ; 04/08/2021 ; NP Gen Surg [address] [facility]  CONY HOUSTON ; 04/16/2021 ; [facility] [address]

## 2021-03-25 NOTE — DISCHARGE NOTE PROVIDER - NSDCCPCAREPLAN_GEN_ALL_CORE_FT
PRINCIPAL DISCHARGE DIAGNOSIS  Diagnosis: Coumadin toxicity, intentional self-harm, initial encounter  Assessment and Plan of Treatment:

## 2021-03-25 NOTE — PROGRESS NOTE ADULT - PROBLEM SELECTOR PROBLEM 1
Coumadin toxicity, accidental or unintentional, initial encounter
Coumadin toxicity, accidental or unintentional, initial encounter

## 2021-03-25 NOTE — PROGRESS NOTE ADULT - REASON FOR ADMISSION
Accidental Warfarin overdose

## 2021-03-25 NOTE — DISCHARGE NOTE PROVIDER - NSDCFUADDINST_GEN_ALL_CORE_FT
1) It is important to see your primary physician as well as other necessary consultants within the next week to perform a comprehensive medical review.  Call their offices for an appointment as soon as you leave the hospital.  If you do not have a primary physician or unable to reach your PCP, contact the [facility] Physician Referral Service at [phone].  Your medical issues appear to be stable at this time, but if your symptoms recur or worsen, contact your physicians and/or return to the hospital if necessary.  If you encounter any issues or questions with your medication, call your physicians before stopping the medication.     2) restart coumadin tonight and monitor for any chest pain/sob (subtherapeutic INR) and active gross bleeding (supratherapeutic INR). call PCP with either symptom.

## 2021-03-25 NOTE — DISCHARGE NOTE NURSING/CASE MANAGEMENT/SOCIAL WORK - PATIENT PORTAL LINK FT
You can access the FollowMyHealth Patient Portal offered by [facility] by registering at the following website: [email or web address]. By joining [employer]’s FollowMyHealth portal, you will also be able to view your health information using other applications (apps) compatible with our system.

## 2021-03-25 NOTE — PROGRESS NOTE ADULT - PROBLEM SELECTOR PLAN 1
- INR increased to 4.99, received vitamin K one dose  - INR lower today  - continue Q12 hr INR checks to look for any increasing INR
- INR 2.02, coming down for two days s/p Vitamin k, reasonable to re-start coumadin on her outpatient dose and follow INR   - no bleeding reported  - supportive care

## 2021-03-25 NOTE — DISCHARGE NOTE PROVIDER - NSDCMRMEDTOKEN_GEN_ALL_CORE_FT
atorvastatin 40 mg oral tablet: 1 tab(s) orally once a day, noon - CRUSH AND MIX IN APLPE SAUCE  DULoxetine 60 mg oral delayed release capsule: 1 cap(s) orally once a day, for leg pain - may open and mix with unsweetened apple sauce  enalapril 2.5 mg oral tablet: 1 tab(s) orally once a day. Crush and mix in apple sauce  ferrous sulfate 325 mg (65 mg elemental iron) oral tablet: 1 tab(s) orally 3 times a day - CRUSH AND MIX IN APLPE SAUCE  folic acid 1 mg oral tablet: 1 tab(s) orally once a day - CRUSH AND MIX IN APLPE SAUCE  levothyroxine 137 mcg (0.137 mg) oral tablet: 1 tab(s) orally once a day - CRUSH AND MIX IN APLPE SAUCE  Lyrica 75 mg oral capsule: 1 cap(s) orally 3 times a day -  open capsule AND MIX IN APLPE SAUCE  meclizine 25 mg oral tablet: 1 tab(s) orally 3 times a day - CRUSH AND MIX IN APLPE SAUCE  Vitamin B-12 1000 mcg oral tablet: 1 tab(s) orally once a day - CRUSH AND MIX IN APLPE SAUCE  Vitamin B6 100 mg oral tablet: 1 tab(s) orally once a day - CRUSH AND MIX IN APLPE SAUCE  warfarin 7.5 mg oral tablet: 1 tab(s) orally once a day, CRUSH AND MIX IN APPLE SAUCE

## 2021-03-25 NOTE — DISCHARGE NOTE PROVIDER - CARE PROVIDER_API CALL
continue your already scheduled appointments,   Phone: (   )    -  Fax: (   )    -  Follow Up Time:

## 2021-03-29 ENCOUNTER — APPOINTMENT (OUTPATIENT)
Dept: INTERNAL MEDICINE | Facility: CLINIC | Age: 51
End: 2021-03-29
Payer: MEDICARE

## 2021-03-29 VITALS
DIASTOLIC BLOOD PRESSURE: 69 MMHG | RESPIRATION RATE: 14 BRPM | HEART RATE: 97 BPM | BODY MASS INDEX: 41.47 KG/M2 | HEIGHT: 63.5 IN | OXYGEN SATURATION: 95 % | SYSTOLIC BLOOD PRESSURE: 101 MMHG | TEMPERATURE: 97 F | WEIGHT: 237 LBS

## 2021-03-29 PROCEDURE — G0439: CPT

## 2021-03-29 NOTE — HISTORY OF PRESENT ILLNESS
[de-identified] : Ms. CONY HOUSTON is a 50 year old female,  with PMHx of morbid obesity,S/P gastric sleeve surgery Nov 2017-has lost about 190 lbs),  DM type II-not on ant meds since having the surgery hypothyroidism, chronic peripheral neuropathy, severe YVONNE, PE X 2 (VTE) on chronic coumadin, ambulatory dysfunction secondary to diabetic neuropathy-uses cane, walker and wheelchair, presents for follow up visit\par She is s/p hospitalization at [facility] 3/ 22/21-3/25/21 for high INR after pt accidently took 6 pills of coumadin\par She is feeling fine\par Denies SOB, chest pain, abdominal pain, N/V/D, leg swelling, headache, dizziness \par Offers no complaints\par \par \par

## 2021-03-29 NOTE — HEALTH RISK ASSESSMENT
[No] : No [Alone] : lives alone [] : No [MammogramDate] : Nov 2018 [PapSmearDate] : 3-4 years ago [ColonoscopyDate] : never

## 2021-03-29 NOTE — ASSESSMENT
[FreeTextEntry1] : Physical\par Referred for mammography\par pt to schedule georgi't with her gyn\par referred to GI for colonoscopy\par pt reports she had the flu vaccine iin the fall and had COVID vaccine last week\par blood work ordered\par \par Hx of morbid obesity,S/P gastric sleeve surgery Nov 2017-has lost about 190 lbs),  DM type II-not on ant meds since having the surgery hypothyroidism, chronic peripheral neuropathy, severe YVONNE, PE X 2 (VTE) on chronic coumadin, ambulatory dysfunction secondary to diabetic neuropathy\par cont current meds-renewed today\par \par s/p hospitalization for accidentally taking too many  coumadin pills\par s/p Vitamin k \par has since resumed her regular coumadin dose \par we'll check INR today\par \par follow up in one week for lab results\par

## 2021-03-29 NOTE — PHYSICAL EXAM
[No Acute Distress] : no acute distress [Well Nourished] : well nourished [Well Developed] : well developed [Well-Appearing] : well-appearing [Normal Sclera/Conjunctiva] : normal sclera/conjunctiva [PERRL] : pupils equal round and reactive to light [EOMI] : extraocular movements intact [Normal Outer Ear/Nose] : the outer ears and nose were normal in appearance [Normal Oropharynx] : the oropharynx was normal [No JVD] : no jugular venous distention [No Lymphadenopathy] : no lymphadenopathy [Supple] : supple [Thyroid Normal, No Nodules] : the thyroid was normal and there were no nodules present [No Respiratory Distress] : no respiratory distress  [No Accessory Muscle Use] : no accessory muscle use [Clear to Auscultation] : lungs were clear to auscultation bilaterally [Normal Rate] : normal rate  [Regular Rhythm] : with a regular rhythm [Normal S1, S2] : normal S1 and S2 [No Murmur] : no murmur heard [No Edema] : there was no peripheral edema [Soft] : abdomen soft [Non Tender] : non-tender [Non-distended] : non-distended [No Masses] : no abdominal mass palpated [No HSM] : no HSM [Normal Bowel Sounds] : normal bowel sounds [Normal Posterior Cervical Nodes] : no posterior cervical lymphadenopathy [Normal Anterior Cervical Nodes] : no anterior cervical lymphadenopathy [No CVA Tenderness] : no CVA  tenderness [No Spinal Tenderness] : no spinal tenderness [No Joint Swelling] : no joint swelling [Grossly Normal Strength/Tone] : grossly normal strength/tone [No Rash] : no rash [Coordination Grossly Intact] : coordination grossly intact [No Focal Deficits] : no focal deficits [Normal Affect] : the affect was normal [Normal Insight/Judgement] : insight and judgment were intact [Alert and Oriented x3] : oriented to person, place, and time [de-identified] : + varicose veins b/l LE [de-identified] : ambulates with cane

## 2021-04-01 DIAGNOSIS — M79.605 PAIN IN LEFT LEG: ICD-10-CM

## 2021-04-01 DIAGNOSIS — M79.604 PAIN IN RIGHT LEG: ICD-10-CM

## 2021-04-01 DIAGNOSIS — T45.511A POISONING BY ANTICOAGULANTS, ACCIDENTAL (UNINTENTIONAL), INITIAL ENCOUNTER: ICD-10-CM

## 2021-04-01 DIAGNOSIS — D68.9 COAGULATION DEFECT, UNSPECIFIED: ICD-10-CM

## 2021-04-01 DIAGNOSIS — I10 ESSENTIAL (PRIMARY) HYPERTENSION: ICD-10-CM

## 2021-04-01 DIAGNOSIS — G89.29 OTHER CHRONIC PAIN: ICD-10-CM
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
## 2021-04-01 DIAGNOSIS — E78.5 HYPERLIPIDEMIA, UNSPECIFIED: ICD-10-CM

## 2021-04-01 DIAGNOSIS — E03.9 HYPOTHYROIDISM, UNSPECIFIED: ICD-10-CM

## 2021-04-01 LAB
25(OH)D3 SERPL-MCNC: 35.9 NG/ML
ALBUMIN SERPL ELPH-MCNC: 4.2 G/DL
ALP BLD-CCNC: 72 U/L
ALT SERPL-CCNC: 23 U/L
ANION GAP SERPL CALC-SCNC: 11 MMOL/L
APPEARANCE: CLEAR
AST SERPL-CCNC: 25 U/L
BILIRUB SERPL-MCNC: 0.2 MG/DL
BILIRUBIN URINE: NEGATIVE
BLOOD URINE: NEGATIVE
BUN SERPL-MCNC: 16 MG/DL
CALCIUM SERPL-MCNC: 8.9 MG/DL
CHLORIDE SERPL-SCNC: 103 MMOL/L
CHOLEST SERPL-MCNC: 124 MG/DL
CO2 SERPL-SCNC: 27 MMOL/L
COLOR: YELLOW
COVID-19 NUCLEOCAPSID  GAM ANTIBODY INTERPRETATION: POSITIVE
COVID-19 SPIKE DOMAIN ANTIBODY INTERPRETATION: POSITIVE
CREAT SERPL-MCNC: 0.6 MG/DL
ESTIMATED AVERAGE GLUCOSE: 91 MG/DL
GLUCOSE QUALITATIVE U: NEGATIVE
GLUCOSE SERPL-MCNC: 82 MG/DL
HBA1C MFR BLD HPLC: 4.8 %
HDLC SERPL-MCNC: 51 MG/DL
INR PPP: 1.72 RATIO
KETONES URINE: NEGATIVE
LDLC SERPL CALC-MCNC: 63 MG/DL
LEUKOCYTE ESTERASE URINE: NEGATIVE
NITRITE URINE: NEGATIVE
NONHDLC SERPL-MCNC: 72 MG/DL
PH URINE: 6.5
POTASSIUM SERPL-SCNC: 4.4 MMOL/L
PROT SERPL-MCNC: 7.8 G/DL
PROTEIN URINE: NEGATIVE
PT BLD: 19.9 SEC
SARS-COV-2 AB SERPL IA-ACNC: >250 U/ML
SARS-COV-2 AB SERPL QL IA: 19.8 INDEX
SODIUM SERPL-SCNC: 141 MMOL/L
SPECIFIC GRAVITY URINE: 1.03
TRIGL SERPL-MCNC: 49 MG/DL
TSH SERPL-ACNC: 0.93 UIU/ML
UROBILINOGEN URINE: NORMAL
VIT B12 SERPL-MCNC: 1165 PG/ML

## 2021-04-05 LAB
BASOPHILS # BLD AUTO: 0.03 K/UL
BASOPHILS NFR BLD AUTO: 0.5 %
EOSINOPHIL # BLD AUTO: 0.09 K/UL
EOSINOPHIL NFR BLD AUTO: 1.4 %
HCT VFR BLD CALC: 44.1 %
HGB BLD-MCNC: 13.7 G/DL
IMM GRANULOCYTES NFR BLD AUTO: 0.2 %
LYMPHOCYTES # BLD AUTO: 1.44 K/UL
LYMPHOCYTES NFR BLD AUTO: 21.8 %
MAN DIFF?: NORMAL
MCHC RBC-ENTMCNC: 28.1 PG
MCHC RBC-ENTMCNC: 31.1 GM/DL
MCV RBC AUTO: 90.4 FL
MONOCYTES # BLD AUTO: 0.68 K/UL
MONOCYTES NFR BLD AUTO: 10.3 %
NEUTROPHILS # BLD AUTO: 4.36 K/UL
NEUTROPHILS NFR BLD AUTO: 65.8 %
PLATELET # BLD AUTO: 199 K/UL
RBC # BLD: 4.88 M/UL
RBC # FLD: 14 %
WBC # FLD AUTO: 6.61 K/UL

## 2021-04-07 ENCOUNTER — LABORATORY RESULT (OUTPATIENT)
Age: 51
End: 2021-04-07

## 2021-04-13 ENCOUNTER — RX RENEWAL (OUTPATIENT)
Age: 51
End: 2021-04-13

## 2021-04-15 ENCOUNTER — LABORATORY RESULT (OUTPATIENT)
Age: 51
End: 2021-04-15

## 2021-04-16 ENCOUNTER — APPOINTMENT (OUTPATIENT)
Dept: BARIATRICS/WEIGHT MGMT | Facility: CLINIC | Age: 51
End: 2021-04-16
Payer: MEDICARE

## 2021-04-16 PROCEDURE — 99442: CPT | Mod: 95

## 2021-05-06 ENCOUNTER — LABORATORY RESULT (OUTPATIENT)
Age: 51
End: 2021-05-06

## 2021-05-13 ENCOUNTER — LABORATORY RESULT (OUTPATIENT)
Age: 51
End: 2021-05-13

## 2021-05-19 ENCOUNTER — RX RENEWAL (OUTPATIENT)
Age: 51
End: 2021-05-19

## 2021-06-04 ENCOUNTER — RX RENEWAL (OUTPATIENT)
Age: 51
End: 2021-06-04

## 2021-06-09 ENCOUNTER — LABORATORY RESULT (OUTPATIENT)
Age: 51
End: 2021-06-09

## 2021-06-15 ENCOUNTER — NON-APPOINTMENT (OUTPATIENT)
Age: 51
End: 2021-06-15

## 2021-06-23 ENCOUNTER — LABORATORY RESULT (OUTPATIENT)
Age: 51
End: 2021-06-23

## 2021-07-02 ENCOUNTER — RX RENEWAL (OUTPATIENT)
Age: 51
End: 2021-07-02

## 2021-07-14 ENCOUNTER — LABORATORY RESULT (OUTPATIENT)
Age: 51
End: 2021-07-14

## 2021-07-20 DIAGNOSIS — R92.1 MAMMOGRAPHIC CALCIFICATION FOUND ON DIAGNOSTIC IMAGING OF BREAST: ICD-10-CM

## 2021-08-04 ENCOUNTER — LABORATORY RESULT (OUTPATIENT)
Age: 51
End: 2021-08-04

## 2021-08-12 ENCOUNTER — APPOINTMENT (OUTPATIENT)
Dept: SURGERY | Facility: CLINIC | Age: 51
End: 2021-08-12
Payer: MEDICARE

## 2021-08-12 VITALS
WEIGHT: 233 LBS | HEIGHT: 64 IN | SYSTOLIC BLOOD PRESSURE: 103 MMHG | RESPIRATION RATE: 18 BRPM | HEART RATE: 94 BPM | BODY MASS INDEX: 39.78 KG/M2 | DIASTOLIC BLOOD PRESSURE: 68 MMHG

## 2021-08-12 PROCEDURE — 99213 OFFICE O/P EST LOW 20 MIN: CPT

## 2021-08-12 NOTE — PHYSICAL EXAM
[de-identified] : Non icteric [de-identified] : CTA [de-identified] : NSR [de-identified] : NTND [de-identified] : Healed

## 2021-08-12 NOTE — REASON FOR VISIT
[DOLV: ___] : DOLV [unfilled] [de-identified] : 11/1/2017 [de-identified] : Patient here in follow-up.  Has not been seen in nearly 2 years secondary to Covid crisis.  Feels well would like to lose more weight but very happy with results to date.\par Pt seeing Dr. Abel Miller for weight loss. Pt is on phentermine and Rybelus for weight loss. Pt reports she has been unable to lose weight. Pt has stayed the same weight since 2019.

## 2021-08-12 NOTE — HISTORY OF PRESENT ILLNESS
[Procedure: ___] : Procedure performed: [unfilled]  [Date of Surgery: ___] : Date of Surgery:   [unfilled] [Surgeon Name:   ___] : Surgeon Name: Dr. NGUYỄN [Pre-Op Weight ___] : Pre-op weight was [unfilled] lbs [___ Years Post Op] : [unfilled] years [FreeTextEntry2] : Improved but limited mobility

## 2021-08-12 NOTE — ASSESSMENT
[___ Years Post Op] : [unfilled] years [Previous Visit Weight: ___] : Previous visit weight: [unfilled] lbs [Today's Weight: ___] : Today's weight:[unfilled] lbs [Today's BMI: ___] : Today's BMI: [unfilled] [de-identified] : Plan:\par Continue with medical weight loss efforts\par Activity as tolerated\par Less than 25 g of carbohydrates per day\par Check labs\par \par Lab Rx given\par Watch portion sizes\par Increase fluids\par Exercise (Bike) 30 min X day

## 2021-08-16 ENCOUNTER — APPOINTMENT (OUTPATIENT)
Dept: INTERNAL MEDICINE | Facility: CLINIC | Age: 51
End: 2021-08-16
Payer: MEDICARE

## 2021-08-16 VITALS
OXYGEN SATURATION: 96 % | DIASTOLIC BLOOD PRESSURE: 67 MMHG | RESPIRATION RATE: 14 BRPM | HEART RATE: 91 BPM | SYSTOLIC BLOOD PRESSURE: 102 MMHG | BODY MASS INDEX: 39.99 KG/M2 | WEIGHT: 233 LBS | TEMPERATURE: 97.3 F

## 2021-08-16 DIAGNOSIS — R26.2 DIFFICULTY IN WALKING, NOT ELSEWHERE CLASSIFIED: ICD-10-CM

## 2021-08-16 PROCEDURE — 99214 OFFICE O/P EST MOD 30 MIN: CPT

## 2021-08-16 NOTE — HISTORY OF PRESENT ILLNESS
[de-identified] : \par Ms. CONY HOUSTON is a 51 year old female, with PMHx of morbid obesity,S/P gastric sleeve surgery Nov 2017-has lost about 190 lbs),  DM type II-not on any meds since having the surgery,  hypothyroidism, chronic peripheral neuropathy, severe YVONNE, PE X 2 (VTE) on chronic coumadin, ambulatory dysfunction secondary to diabetic neuropathy-uses cane, and uses walker if she has to walk longer distances, presents for follow up visit \par Pt states that she has a lot of loose skin in her arms and abdomen since loosing so much weight.  She sometimes gets itchiness and redness in between the loose skin in her abdomen. Also says that her arm feels very heavy when she lifts it up because of so much loose skin in her upper arms\par She follows with her bariatric surgeon yearly, but due to COVID she had not seen him for a year or so. She saw him last week. Says she discussed the problem with her skin and he reccomended she sees a plastic surgeon. Pt says he had reccomended she see plastic surgeon in the past but she wanted to wait.\par Denies SOB, chest pain, abdominal pain, N/V/D, leg swelling, headache, dizziness \par \par \par \par

## 2021-08-16 NOTE — REVIEW OF SYSTEMS
[Negative] : Heme/Lymph [FreeTextEntry9] : ambulatory dysfunction [de-identified] : see hpi [de-identified] : b/l foot diabetic neuropathy

## 2021-08-16 NOTE — PHYSICAL EXAM
[No Acute Distress] : no acute distress [Well Nourished] : well nourished [Well Developed] : well developed [Well-Appearing] : well-appearing [Normal Sclera/Conjunctiva] : normal sclera/conjunctiva [PERRL] : pupils equal round and reactive to light [EOMI] : extraocular movements intact [Normal Outer Ear/Nose] : the outer ears and nose were normal in appearance [Normal Oropharynx] : the oropharynx was normal [No JVD] : no jugular venous distention [No Lymphadenopathy] : no lymphadenopathy [Supple] : supple [No Respiratory Distress] : no respiratory distress  [No Accessory Muscle Use] : no accessory muscle use [Clear to Auscultation] : lungs were clear to auscultation bilaterally [Normal Rate] : normal rate  [Regular Rhythm] : with a regular rhythm [Normal S1, S2] : normal S1 and S2 [No Murmur] : no murmur heard [No Abdominal Bruit] : a ~M bruit was not heard ~T in the abdomen [No Palpable Aorta] : no palpable aorta [Soft] : abdomen soft [Non Tender] : non-tender [Non-distended] : non-distended [No Masses] : no abdominal mass palpated [No HSM] : no HSM [Normal Bowel Sounds] : normal bowel sounds [Normal Posterior Cervical Nodes] : no posterior cervical lymphadenopathy [Normal Anterior Cervical Nodes] : no anterior cervical lymphadenopathy [No CVA Tenderness] : no CVA  tenderness [No Spinal Tenderness] : no spinal tenderness [No Joint Swelling] : no joint swelling [Grossly Normal Strength/Tone] : grossly normal strength/tone [No Focal Deficits] : no focal deficits [Speech Grossly Normal] : speech grossly normal [Normal Affect] : the affect was normal [Alert and Oriented x3] : oriented to person, place, and time [Normal Insight/Judgement] : insight and judgment were intact [Normal] : affect was normal and insight and judgment were intact [de-identified] : + varicosities [de-identified] : loose skin in abdomen, thighs upper arms [de-identified] : hx of diabetic neuropathy-ambulating with cane

## 2021-08-16 NOTE — ASSESSMENT
[FreeTextEntry1] : \par Hx of morbid obesity,S/P gastric sleeve surgery Nov 2017-has lost about 190 lbs)\par follows with bariatric sx\par \par Loose skin in abdomen, upper arms and thighs following > 100lbs weight loss\par referred to plastic Sx\par \par \par DM type II-not on ant meds since having the surgery\par reinforced importance of following a low sugar/low carb diet\par we'll check glucose and HgA1c today\par \par Hypothyroidism\par cont synthroid 137mcg daily\par we'll check TSH/t4 today\par \par HLD\par cont Atorvastatin 40mg daily\par discussed importance of following a low cholesterol/low fat diet\par we'll check Lipid panel and LFT's today\par \par Hx of PE X 2 (VTE)\par on chronic coumadin\par we'll check INR today\par \par chronic peripheral neuropathy, severe YVONNE,  ambulatory dysfunction secondary to diabetic neuropathy\par cont lyrica and oxycodone\par \par s/p hospitalization for accidentally taking too many  coumadin pills\par s/p Vitamin k \par has since resumed her regular coumadin dose \par we'll check INR today\par \par follow up in one week for lab results\par

## 2021-08-21 LAB
25(OH)D3 SERPL-MCNC: 34 NG/ML
ALBUMIN SERPL ELPH-MCNC: 4 G/DL
ALP BLD-CCNC: 60 U/L
ALT SERPL-CCNC: 35 U/L
ANION GAP SERPL CALC-SCNC: 11 MMOL/L
AST SERPL-CCNC: 32 U/L
BASOPHILS # BLD AUTO: 0.04 K/UL
BASOPHILS NFR BLD AUTO: 0.7 %
BILIRUB SERPL-MCNC: 0.2 MG/DL
BUN SERPL-MCNC: 17 MG/DL
CALCIUM SERPL-MCNC: 9.3 MG/DL
CHLORIDE SERPL-SCNC: 105 MMOL/L
CHOLEST SERPL-MCNC: 121 MG/DL
CO2 SERPL-SCNC: 26 MMOL/L
CREAT SERPL-MCNC: 0.66 MG/DL
EOSINOPHIL # BLD AUTO: 0.2 K/UL
EOSINOPHIL NFR BLD AUTO: 3.5 %
ESTIMATED AVERAGE GLUCOSE: 94 MG/DL
FERRITIN SERPL-MCNC: 30 NG/ML
FOLATE SERPL-MCNC: >20 NG/ML
GLUCOSE SERPL-MCNC: 75 MG/DL
HBA1C MFR BLD HPLC: 4.9 %
HCT VFR BLD CALC: 40.8 %
HDLC SERPL-MCNC: 48 MG/DL
HGB BLD-MCNC: 12.5 G/DL
IMM GRANULOCYTES NFR BLD AUTO: 0.3 %
INR PPP: 2.38 RATIO
IRON SATN MFR SERPL: 44 %
IRON SERPL-MCNC: 144 UG/DL
LDLC SERPL CALC-MCNC: 60 MG/DL
LYMPHOCYTES # BLD AUTO: 1.07 K/UL
LYMPHOCYTES NFR BLD AUTO: 18.7 %
MAN DIFF?: NORMAL
MCHC RBC-ENTMCNC: 27.8 PG
MCHC RBC-ENTMCNC: 30.6 GM/DL
MCV RBC AUTO: 90.9 FL
MONOCYTES # BLD AUTO: 0.54 K/UL
MONOCYTES NFR BLD AUTO: 9.4 %
NEUTROPHILS # BLD AUTO: 3.86 K/UL
NEUTROPHILS NFR BLD AUTO: 67.4 %
NONHDLC SERPL-MCNC: 73 MG/DL
PLATELET # BLD AUTO: 193 K/UL
POTASSIUM SERPL-SCNC: 5 MMOL/L
PROT SERPL-MCNC: 7.7 G/DL
PT BLD: 26.9 SEC
RBC # BLD: 4.49 M/UL
RBC # FLD: 14 %
SODIUM SERPL-SCNC: 142 MMOL/L
TIBC SERPL-MCNC: 324 UG/DL
TRIGL SERPL-MCNC: 66 MG/DL
TSH SERPL-ACNC: 0.8 UIU/ML
UIBC SERPL-MCNC: 180 UG/DL
VIT B12 SERPL-MCNC: 922 PG/ML
WBC # FLD AUTO: 5.73 K/UL

## 2021-08-27 ENCOUNTER — RX RENEWAL (OUTPATIENT)
Age: 51
End: 2021-08-27

## 2021-08-31 RX ORDER — INCONTINENCE PAD,LINER,DISP
EACH MISCELLANEOUS
Qty: 1 | Refills: 1 | Status: ACTIVE | COMMUNITY
Start: 2020-06-24 | End: 1900-01-01

## 2021-09-07 ENCOUNTER — RX RENEWAL (OUTPATIENT)
Age: 51
End: 2021-09-07

## 2021-09-09 NOTE — H&P ADULT - ASSESSMENT
Opt out
47 year old female s/p lap sleeve gastrectomy, now with retained food bolus is stomach  - Admit to Bariatric Surgery  - GI consult for possible scope  - Strict NPO / IVF  - Head of bed raised to 45 degrees    M MD Lam PGY 2

## 2021-09-15 ENCOUNTER — LABORATORY RESULT (OUTPATIENT)
Age: 51
End: 2021-09-15

## 2021-09-22 ENCOUNTER — APPOINTMENT (OUTPATIENT)
Dept: PLASTIC SURGERY | Facility: CLINIC | Age: 51
End: 2021-09-22
Payer: MEDICARE

## 2021-09-22 VITALS
TEMPERATURE: 97.4 F | DIASTOLIC BLOOD PRESSURE: 70 MMHG | SYSTOLIC BLOOD PRESSURE: 111 MMHG | HEIGHT: 64 IN | HEART RATE: 92 BPM | WEIGHT: 227 LBS | BODY MASS INDEX: 38.76 KG/M2

## 2021-09-22 DIAGNOSIS — E65 LOCALIZED ADIPOSITY: ICD-10-CM

## 2021-09-22 PROCEDURE — 99204 OFFICE O/P NEW MOD 45 MIN: CPT

## 2021-09-23 PROBLEM — E65 LOCALIZED ADIPOSITY: Status: ACTIVE | Noted: 2021-09-23

## 2021-09-23 NOTE — PHYSICAL EXAM
[de-identified] : NAD. 39.0. [de-identified] : Normal respiratory effort [de-identified] : bilateral upper arms with significant soft tissue excess and laxity. Moderate lateral chest excess.

## 2021-09-23 NOTE — REVIEW OF SYSTEMS
[Negative] : Endocrine [FreeTextEntry6] : Pulmonary Embolism.  [de-identified] : Numbness. [de-identified] : History of blood clots, Diabetic neuropathy.

## 2021-09-23 NOTE — REASON FOR VISIT
[Consultation] : a consultation visit [FreeTextEntry1] : For Excess skin after weight loss from gastric sleeve.

## 2021-09-23 NOTE — ASSESSMENT
[FreeTextEntry1] : Localized adiposity of upper limbs.  Patient would likely have functional improvement with removal of the excess skin and a portion of fat of her upper arms.  In light of this, it should be considered medically necessary.  She will need hematology clearance to be off of anticoagulation for the procedure and the plan for reintroduction of anticoagulation postoperatively.

## 2021-09-23 NOTE — HISTORY OF PRESENT ILLNESS
[FreeTextEntry1] : 52 y/o woman presents following massive weight loss with excess skin of her upper arms. She was 430 lbs prior to her gastric sleeve in 2017. She is now 227 lbs and states she has been at approximately this weight for a year. She complains of rashes within her underarm areas, which she has treated with over-the counter pain medication. She has difficulty lifting her arms above 90 degrees because of the excess weight on them. She also has concerns about her abdominal pannus but her priority is her arms. She has a hx of PE and is still on coumadin (goal INR 2-3). \par \par She is disabled. She lives alone. She states her aid and sister would be able to help her after surgery. She denies nicotine use, alcohol use, and recreational drug use.

## 2021-10-06 ENCOUNTER — LABORATORY RESULT (OUTPATIENT)
Age: 51
End: 2021-10-06

## 2021-10-12 ENCOUNTER — RX RENEWAL (OUTPATIENT)
Age: 51
End: 2021-10-12

## 2021-10-27 ENCOUNTER — LABORATORY RESULT (OUTPATIENT)
Age: 51
End: 2021-10-27

## 2021-11-01 ENCOUNTER — APPOINTMENT (OUTPATIENT)
Dept: INTERNAL MEDICINE | Facility: CLINIC | Age: 51
End: 2021-11-01
Payer: MEDICARE

## 2021-11-01 VITALS
WEIGHT: 234 LBS | TEMPERATURE: 97.7 F | SYSTOLIC BLOOD PRESSURE: 113 MMHG | HEART RATE: 85 BPM | RESPIRATION RATE: 14 BRPM | DIASTOLIC BLOOD PRESSURE: 73 MMHG | OXYGEN SATURATION: 96 % | HEIGHT: 64 IN | BODY MASS INDEX: 39.95 KG/M2

## 2021-11-01 DIAGNOSIS — Z23 ENCOUNTER FOR IMMUNIZATION: ICD-10-CM

## 2021-11-01 PROCEDURE — 99214 OFFICE O/P EST MOD 30 MIN: CPT

## 2021-11-01 RX ORDER — FERROUS SULFATE TAB 325 MG (65 MG ELEMENTAL FE) 325 (65 FE) MG
325 (65 FE) TAB ORAL
Qty: 270 | Refills: 1 | Status: DISCONTINUED | COMMUNITY
Start: 2021-04-13 | End: 2021-11-01

## 2021-11-01 RX ORDER — ENALAPRIL MALEATE 2.5 MG/1
2.5 TABLET ORAL DAILY
Qty: 90 | Refills: 0 | Status: DISCONTINUED | COMMUNITY
Start: 2020-02-21 | End: 2021-11-01

## 2021-11-01 RX ORDER — DICLOFENAC SODIUM 1% 10 MG/G
1 GEL TOPICAL
Qty: 1 | Refills: 0 | Status: DISCONTINUED | COMMUNITY
Start: 2020-12-03 | End: 2021-11-01

## 2021-11-01 RX ORDER — ATORVASTATIN CALCIUM 40 MG/1
40 TABLET, FILM COATED ORAL DAILY
Qty: 90 | Refills: 0 | Status: DISCONTINUED | COMMUNITY
Start: 2020-02-21 | End: 2021-11-01

## 2021-11-01 RX ORDER — LEVOTHYROXINE SODIUM 0.14 MG/1
137 TABLET ORAL
Qty: 90 | Refills: 1 | Status: DISCONTINUED | COMMUNITY
Start: 2020-02-21 | End: 2021-11-01

## 2021-11-26 ENCOUNTER — LABORATORY RESULT (OUTPATIENT)
Age: 51
End: 2021-11-26

## 2021-11-27 NOTE — REVIEW OF SYSTEMS
[Negative] : Heme/Lymph [FreeTextEntry9] : ambulatory dysfunction [de-identified] : Chronic neuropathy pains in b/l feet

## 2021-11-27 NOTE — PHYSICAL EXAM
[No Acute Distress] : no acute distress [Well Nourished] : well nourished [Well Developed] : well developed [Well-Appearing] : well-appearing [Normal Sclera/Conjunctiva] : normal sclera/conjunctiva [PERRL] : pupils equal round and reactive to light [EOMI] : extraocular movements intact [Normal Outer Ear/Nose] : the outer ears and nose were normal in appearance [Normal Oropharynx] : the oropharynx was normal [No JVD] : no jugular venous distention [No Lymphadenopathy] : no lymphadenopathy [Supple] : supple [No Respiratory Distress] : no respiratory distress  [No Accessory Muscle Use] : no accessory muscle use [Clear to Auscultation] : lungs were clear to auscultation bilaterally [Normal Rate] : normal rate  [Regular Rhythm] : with a regular rhythm [Normal S1, S2] : normal S1 and S2 [No Abdominal Bruit] : a ~M bruit was not heard ~T in the abdomen [No Palpable Aorta] : no palpable aorta [Soft] : abdomen soft [Non Tender] : non-tender [Non-distended] : non-distended [No Masses] : no abdominal mass palpated [Normal Bowel Sounds] : normal bowel sounds [Normal Posterior Cervical Nodes] : no posterior cervical lymphadenopathy [Normal Anterior Cervical Nodes] : no anterior cervical lymphadenopathy [No CVA Tenderness] : no CVA  tenderness [No Spinal Tenderness] : no spinal tenderness [No Joint Swelling] : no joint swelling [Grossly Normal Strength/Tone] : grossly normal strength/tone [No Focal Deficits] : no focal deficits [Speech Grossly Normal] : speech grossly normal [Normal Affect] : the affect was normal [Alert and Oriented x3] : oriented to person, place, and time [Normal Insight/Judgement] : insight and judgment were intact [Normal] : affect was normal and insight and judgment were intact [de-identified] : + murmur [de-identified] : + b/l LE varicosities  [de-identified] : loose skin in abdomen, thighs upper arms [de-identified] : hx of diabetic neuropathy-ambulating with cane

## 2021-11-27 NOTE — HISTORY OF PRESENT ILLNESS
[de-identified] : Ms. CONY HOUSTON is a 51 year old female, with history  of morbid obesity,S/P gastric sleeve surgery Nov 2017 -has lost about 190 lbs),  T2DM ( not on medications)  hypothyroidism, chronic peripheral neuropathy, severe YVONNE, PE X 2 (VTE) on chronic Coumadin, ambulatory dysfunction secondary to diabetic neuropathy- uses cane, and uses walker if she has to walk longer distances, presents for follow up visit \par \par \par Denies SOB, chest pain, abdominal pain, N/V/D, leg swelling, headache, dizziness \par \par \par \par

## 2021-11-27 NOTE — ASSESSMENT
[FreeTextEntry1] : \par Hx of morbid obesity,S/P gastric sleeve surgery Nov 2017-has lost about 190 lbs)\par follows with bariatric sx\par \par DM type II-not on any meds since having the surgery-diet controlled\par reinforced importance of following a low sugar/low carb diet\par cont to monitor FS at home\par we'll check glucose and HgA1c today\par \par chronic peripheral neuropathy, severe YVONNE,  ambulatory dysfunction secondary to diabetic neuropathy\par cont lyrica-renewed today\par cont oxycodone\par \par Hypothyroidism\par cont synthroid 137mcg daily-renewed today\par we'll check TSH/t4 today\par \par HTN\par cont Enalapril 2,5mg daily-renewed today\par reinforced importance of following a low salt diet, weight loss, exercise\par \par HLD\par cont Atorvastatin 40mg daily-renewed today\par discussed importance of following a low cholesterol/low fat diet\par we'll check Lipid panel and LFT's today\par \par Hx of PE X 2 (VTE)\par on chronic coumadin\par \par follow up in one week for lab results\par

## 2021-12-10 ENCOUNTER — RX RENEWAL (OUTPATIENT)
Age: 51
End: 2021-12-10

## 2021-12-21 ENCOUNTER — APPOINTMENT (OUTPATIENT)
Dept: BARIATRICS/WEIGHT MGMT | Facility: CLINIC | Age: 51
End: 2021-12-21
Payer: MEDICARE

## 2021-12-21 VITALS
WEIGHT: 234.13 LBS | DIASTOLIC BLOOD PRESSURE: 70 MMHG | OXYGEN SATURATION: 98 % | BODY MASS INDEX: 39.97 KG/M2 | HEART RATE: 97 BPM | HEIGHT: 64 IN | SYSTOLIC BLOOD PRESSURE: 110 MMHG

## 2021-12-21 PROCEDURE — 99213 OFFICE O/P EST LOW 20 MIN: CPT

## 2021-12-22 ENCOUNTER — LABORATORY RESULT (OUTPATIENT)
Age: 51
End: 2021-12-22

## 2021-12-31 NOTE — ASSESSMENT
[FreeTextEntry1] : BARIATRIC SURGERY HISTORY: gastric sleeve\par \par OBESITY COMORBIDITIES: DM (resolved), HTN (resolved), HLD (resolved)\par \par ANTI-OBESITY MEDICATIONS: bupropion (not currently taking), victoza (not taking post-op), phentermine\par \par OBESITY MEDICATION SIDE EFFECTS: none\par \par  \par Plan: \par \par Continue current food plan. with Increased fiber, reduce snacking.  Ok to eat fruit.\par Use bike, and continue walking as tolerated\par cont phentermine 37.5\par will switch from oral to injectable semaglutide starting at 0.25mg and uptitrate monthly (potential for higher dose ultimately with wegovy)\par \par rtc in 4 weeks.

## 2021-12-31 NOTE — HISTORY OF PRESENT ILLNESS
[FreeTextEntry1] : 50 yo woman with past history of morbid obesity, dm2, s/p sleeve gastrectomy\par \par Patient's weight mostly unchanged since last visit, though total weight loss 195 lb weight loss. \par She has been on phentermine 37.5mg + rybelsus 14mg and feels weight has stagnated (BMI still 40)\par She is using stationary bike at home 20-30 minutes daily and walking more with cane. \par Reports that her neuropathy is more numbness than pain in her feet but uses cane for safety.  \par Dietarily eating protein sparing, low luigi diet but feels she would like to lose more weight.\par \par Otherwise extremely happy with her total progress and without other new complaints today.

## 2022-01-17 ENCOUNTER — RX RENEWAL (OUTPATIENT)
Age: 52
End: 2022-01-17

## 2022-01-19 ENCOUNTER — LABORATORY RESULT (OUTPATIENT)
Age: 52
End: 2022-01-19

## 2022-01-28 ENCOUNTER — RX RENEWAL (OUTPATIENT)
Age: 52
End: 2022-01-28

## 2022-02-04 ENCOUNTER — LABORATORY RESULT (OUTPATIENT)
Age: 52
End: 2022-02-04

## 2022-02-08 ENCOUNTER — APPOINTMENT (OUTPATIENT)
Dept: BARIATRICS/WEIGHT MGMT | Facility: CLINIC | Age: 52
End: 2022-02-08
Payer: MEDICARE

## 2022-02-08 VITALS
OXYGEN SATURATION: 96 % | WEIGHT: 232.5 LBS | HEIGHT: 64 IN | HEART RATE: 93 BPM | DIASTOLIC BLOOD PRESSURE: 70 MMHG | SYSTOLIC BLOOD PRESSURE: 110 MMHG | BODY MASS INDEX: 39.69 KG/M2

## 2022-02-08 PROCEDURE — 99213 OFFICE O/P EST LOW 20 MIN: CPT

## 2022-02-10 ENCOUNTER — APPOINTMENT (OUTPATIENT)
Dept: SURGERY | Facility: CLINIC | Age: 52
End: 2022-02-10
Payer: MEDICARE

## 2022-02-10 VITALS
BODY MASS INDEX: 39.44 KG/M2 | DIASTOLIC BLOOD PRESSURE: 81 MMHG | WEIGHT: 231 LBS | HEIGHT: 64 IN | OXYGEN SATURATION: 98 % | RESPIRATION RATE: 8 BRPM | HEART RATE: 79 BPM | SYSTOLIC BLOOD PRESSURE: 138 MMHG | TEMPERATURE: 97.5 F

## 2022-02-10 PROCEDURE — 99213 OFFICE O/P EST LOW 20 MIN: CPT

## 2022-02-10 RX ORDER — ORAL SEMAGLUTIDE 7 MG/1
7 TABLET ORAL
Qty: 30 | Refills: 3 | Status: DISCONTINUED | COMMUNITY
Start: 2021-03-17 | End: 2022-02-10

## 2022-02-10 RX ORDER — ORAL SEMAGLUTIDE 14 MG/1
14 TABLET ORAL
Qty: 30 | Refills: 5 | Status: DISCONTINUED | COMMUNITY
Start: 2021-04-19 | End: 2022-02-10

## 2022-02-10 NOTE — PHYSICAL EXAM
[de-identified] : anicteric, mucous membranes moist  [de-identified] : CTA  [de-identified] : RRR [de-identified] : abdomen soft nontender nondistended  [de-identified] : healed

## 2022-02-10 NOTE — HISTORY OF PRESENT ILLNESS
[Procedure: ___] : Procedure performed: [unfilled]  [Date of Surgery: ___] : Date of Surgery:   [unfilled] [Surgeon Name:   ___] : Surgeon Name: Dr. NGUYỄN [Pre-Op Weight ___] : Pre-op weight was [unfilled] lbs [___ Years Post Op] : [unfilled] years [Phase 4] : Phase 4 [Diabetes] : Diabetes [Hypertension] : Hypertension [Sleep Apnea] : Sleep Apnea [Hyperlipidemia] : Hyperlipidemia [de-identified] : Feels well\par Wants to lose more weight\par More active than preop [de-identified] : Following obesity medicine, Dr. Miller. Currently on phentermine and added ozempic \par Previous visit vitamin levels all wnl

## 2022-02-10 NOTE — ASSESSMENT
[___ Years Post Op] : [unfilled] years [Previous Visit Weight: ___] : Previous visit weight: [unfilled] lbs [Cormobidities: ___] : Comorbidities: [unfilled] [Today's Weight: ___] : Today's weight:[unfilled] lbs [Today's BMI: ___] : Today's BMI: [unfilled] [de-identified] : Plan:\par Watch portion sizes\par Pt on phentermine and ozempic as per Dr. Miller\par activity as tolerated

## 2022-02-14 NOTE — ASSESSMENT
[FreeTextEntry1] : BARIATRIC SURGERY HISTORY: gastric sleeve\par \par OBESITY COMORBIDITIES: DM (resolved), HTN (resolved), HLD (resolved)\par \par ANTI-OBESITY MEDICATIONS: bupropion (not currently taking), victoza (not taking post-op), phentermine, ozempic\par \par OBESITY MEDICATION SIDE EFFECTS: none\par \par  \par Plan: \par \par Continue current food plan. with Increased fiber, reduce snacking.  Ok to eat fruit.\par Use bike, and continue walking as tolerated\par cont phentermine 37.5\par cont ozempic, now 1 mg\par is looking into surgery for abdominoplasty but would also like loose skin on arms worked on\par \par rtc in 4 weeks.

## 2022-02-14 NOTE — HISTORY OF PRESENT ILLNESS
FreeTextEntry1] : 50 yo woman with past history of morbid obesity, dm2, s/p sleeve gastrectomy and >200 lb total weight loss\par \par doing ok with ozempic and phentermine now though weight stable\par still with neuropathy in feet though more numbness and ambulating better with cane\par able to use bike at home daily\par having issues with loose abdominal skin but also substantial skin in arms causing pain due to weight of skin, rash and requires 2 showers per day to maintain hygeine as a result\par was told that insurance would only cover abdominoplasty\par otherwise without new complaints today

## 2022-02-18 LAB
INR PPP: 1.75 RATIO
PT BLD: 20.1 SEC

## 2022-02-25 ENCOUNTER — LABORATORY RESULT (OUTPATIENT)
Age: 52
End: 2022-02-25

## 2022-03-04 ENCOUNTER — RX RENEWAL (OUTPATIENT)
Age: 52
End: 2022-03-04

## 2022-03-08 ENCOUNTER — LABORATORY RESULT (OUTPATIENT)
Age: 52
End: 2022-03-08

## 2022-03-22 ENCOUNTER — APPOINTMENT (OUTPATIENT)
Dept: BARIATRICS/WEIGHT MGMT | Facility: CLINIC | Age: 52
End: 2022-03-22
Payer: MEDICARE

## 2022-03-22 VITALS
HEIGHT: 64 IN | DIASTOLIC BLOOD PRESSURE: 70 MMHG | OXYGEN SATURATION: 97 % | HEART RATE: 97 BPM | WEIGHT: 231.13 LBS | BODY MASS INDEX: 39.46 KG/M2 | SYSTOLIC BLOOD PRESSURE: 120 MMHG

## 2022-03-22 PROCEDURE — 99213 OFFICE O/P EST LOW 20 MIN: CPT

## 2022-03-27 NOTE — ASSESSMENT
[FreeTextEntry1] : BARIATRIC SURGERY HISTORY: gastric sleeve\par \par OBESITY COMORBIDITIES: DM (resolved), HTN (resolved), HLD (resolved)\par \par ANTI-OBESITY MEDICATIONS: bupropion (not currently taking), victoza (not taking post-op), phentermine, ozempic\par \par OBESITY MEDICATION SIDE EFFECTS: none\par \par  \par Plan: \par \par Continue current food plan. with Increased fiber, reduce snacking.  \par Use bike, and continue walking as tolerated\par cont phentermine 37.5/cont ozempic, now 1 mg - but will hold meds 1 week before surgery and restart 1 week after as tolerated\par we discussed her desire to have loose arm skin worked on given pain and rashes - will look into coverage options but every source I have checked confirms that medicare consider this "cosmetic" in spite of her symptoms\par \par rtc in 4 weeks.(virtual)

## 2022-03-27 NOTE — HISTORY OF PRESENT ILLNESS
[FreeTextEntry1] : 50 yo woman with past history of morbid obesity, dm2, s/p sleeve gastrectomy and >200 lb total weight loss\par \par doing ok with ozempic and phentermine now though weight only down 1 lb in past 1+ month in spite of increase in ozempic to 1mg.  \par \par Nonetheless her total weight loss is >200 lbs and she is not disappointed in overall progress\par \par she is scheduled for upcoming abdominoplasty given excess abdominal/panus skin.\par \par still has neuropathy in feet though more numbness and ambulating better with cane\par able to use bike at home daily\par \par otherwise without new complaints today

## 2022-03-28 ENCOUNTER — APPOINTMENT (OUTPATIENT)
Dept: INTERNAL MEDICINE | Facility: CLINIC | Age: 52
End: 2022-03-28
Payer: MEDICARE

## 2022-03-28 ENCOUNTER — NON-APPOINTMENT (OUTPATIENT)
Age: 52
End: 2022-03-28

## 2022-03-28 VITALS
WEIGHT: 233 LBS | HEIGHT: 64 IN | BODY MASS INDEX: 39.78 KG/M2 | RESPIRATION RATE: 12 BRPM | DIASTOLIC BLOOD PRESSURE: 68 MMHG | TEMPERATURE: 97.3 F | OXYGEN SATURATION: 96 % | SYSTOLIC BLOOD PRESSURE: 104 MMHG | HEART RATE: 93 BPM

## 2022-03-28 PROCEDURE — 99214 OFFICE O/P EST MOD 30 MIN: CPT | Mod: 25

## 2022-03-28 PROCEDURE — 93000 ELECTROCARDIOGRAM COMPLETE: CPT

## 2022-03-29 LAB
ALBUMIN SERPL ELPH-MCNC: 4.2 G/DL
ALP BLD-CCNC: 65 U/L
ALT SERPL-CCNC: 44 U/L
ANION GAP SERPL CALC-SCNC: 11 MMOL/L
AST SERPL-CCNC: 33 U/L
BASOPHILS # BLD AUTO: 0.03 K/UL
BASOPHILS NFR BLD AUTO: 0.6 %
BILIRUB SERPL-MCNC: 0.3 MG/DL
BUN SERPL-MCNC: 21 MG/DL
CALCIUM SERPL-MCNC: 9.1 MG/DL
CHLORIDE SERPL-SCNC: 105 MMOL/L
CO2 SERPL-SCNC: 25 MMOL/L
CREAT SERPL-MCNC: 0.61 MG/DL
EGFR: 108 ML/MIN/1.73M2
EOSINOPHIL # BLD AUTO: 0.11 K/UL
EOSINOPHIL NFR BLD AUTO: 2.1 %
GLUCOSE SERPL-MCNC: 84 MG/DL
HCT VFR BLD CALC: 41.8 %
HGB BLD-MCNC: 13.2 G/DL
IMM GRANULOCYTES NFR BLD AUTO: 0.2 %
LYMPHOCYTES # BLD AUTO: 1.23 K/UL
LYMPHOCYTES NFR BLD AUTO: 23.3 %
MAN DIFF?: NORMAL
MCHC RBC-ENTMCNC: 27.6 PG
MCHC RBC-ENTMCNC: 31.6 GM/DL
MCV RBC AUTO: 87.4 FL
MONOCYTES # BLD AUTO: 0.64 K/UL
MONOCYTES NFR BLD AUTO: 12.1 %
NEUTROPHILS # BLD AUTO: 3.26 K/UL
NEUTROPHILS NFR BLD AUTO: 61.7 %
PLATELET # BLD AUTO: 189 K/UL
POTASSIUM SERPL-SCNC: 4.9 MMOL/L
PROT SERPL-MCNC: 7.2 G/DL
RBC # BLD: 4.78 M/UL
RBC # FLD: 14.1 %
SODIUM SERPL-SCNC: 141 MMOL/L
WBC # FLD AUTO: 5.28 K/UL

## 2022-04-01 ENCOUNTER — NON-APPOINTMENT (OUTPATIENT)
Age: 52
End: 2022-04-01

## 2022-04-09 NOTE — ASSESSMENT
[Patient Optimized for Surgery] : Patient optimized for surgery [FreeTextEntry4] : \par pt to stop coumadin 5 days prior to surgery \par INR to be rechecked the day prior to surgery

## 2022-04-09 NOTE — PHYSICAL EXAM
[Normal Sclera/Conjunctiva] : normal sclera/conjunctiva [Normal Outer Ear/Nose] : the outer ears and nose were normal in appearance [No JVD] : no jugular venous distention [No Lymphadenopathy] : no lymphadenopathy [Normal Rate] : normal rate  [Regular Rhythm] : with a regular rhythm [Normal S1, S2] : normal S1 and S2 [Soft] : abdomen soft [Non Tender] : non-tender [Non-distended] : non-distended [Normal Supraclavicular Nodes] : no supraclavicular lymphadenopathy [No CVA Tenderness] : no CVA  tenderness [No Joint Swelling] : no joint swelling [Speech Grossly Normal] : speech grossly normal [Alert and Oriented x3] : oriented to person, place, and time [Normal] : affect was normal and insight and judgment were intact [de-identified] : + murmur [de-identified] : b/l LE varicosities  [de-identified] : excess loose skin in abdomen, thighs and upper arms [de-identified] : hx of diabetic neuropathy, ambulating with cane

## 2022-04-09 NOTE — HISTORY OF PRESENT ILLNESS
[FreeTextEntry2] : 4/6/22 [FreeTextEntry1] : Panniculectomy-infraumbilical [FreeTextEntry3] : Dr Chavez Palomares [FreeTextEntry4] : \par Ms. CONY HOUSTON is a 51 year old female, with hx of morbid obesity, s/p gastric sleeve Sx ( Nov 2017) ( has los about 190 lbs, DM II, not on any meds-, hypothyroid, chronic peripheral neuropathy, severe OA, PE X 2 ( VTE) on chronic coumadin, ambulatory dysfunction, secondary to diabetic neuropathy, presents for preoperative medical clearance\par She is doing well. \par Denies SOB, chest pain, abdominal pain, N/V/D, leg swelling, headache, dizziness \par Offers no complaints\par \par \par

## 2022-04-09 NOTE — REVIEW OF SYSTEMS
[Negative] : Heme/Lymph [FreeTextEntry9] : ambulatory dysfunction [de-identified] : chronic neuropathy b/l feet

## 2022-04-12 LAB
INR PPP: 1.15 RATIO
PT BLD: 13.5 SEC

## 2022-04-19 ENCOUNTER — RX RENEWAL (OUTPATIENT)
Age: 52
End: 2022-04-19

## 2022-04-27 ENCOUNTER — APPOINTMENT (OUTPATIENT)
Dept: BARIATRICS/WEIGHT MGMT | Facility: CLINIC | Age: 52
End: 2022-04-27
Payer: MEDICARE

## 2022-04-27 PROCEDURE — 99442: CPT | Mod: 95

## 2022-05-05 ENCOUNTER — LABORATORY RESULT (OUTPATIENT)
Age: 52
End: 2022-05-05

## 2022-05-23 ENCOUNTER — RX RENEWAL (OUTPATIENT)
Age: 52
End: 2022-05-23

## 2022-05-31 ENCOUNTER — APPOINTMENT (OUTPATIENT)
Dept: BARIATRICS/WEIGHT MGMT | Facility: CLINIC | Age: 52
End: 2022-05-31
Payer: MEDICARE

## 2022-05-31 VITALS
HEIGHT: 64 IN | OXYGEN SATURATION: 98 % | DIASTOLIC BLOOD PRESSURE: 70 MMHG | WEIGHT: 234 LBS | BODY MASS INDEX: 39.95 KG/M2 | HEART RATE: 77 BPM | SYSTOLIC BLOOD PRESSURE: 110 MMHG

## 2022-05-31 PROCEDURE — 99214 OFFICE O/P EST MOD 30 MIN: CPT

## 2022-06-01 NOTE — HISTORY OF PRESENT ILLNESS
[FreeTextEntry1] : 52 yo woman with past history of morbid obesity, dm2, s/p sleeve gastrectomy and >200 lb total weight loss\par \par doing ok with ozempic and phentermine now though weight has not changed much over past 2 years at this point\par \par Nonetheless her total weight loss is >200 lbs and she is not disappointed in overall progress\par \par she was told that hospital fees not covered for paniculectomy and no coverage at all for excess arm skin\par \par she reports that she has complete numbness of arms at this point

## 2022-06-01 NOTE — ASSESSMENT
[FreeTextEntry1] : BARIATRIC SURGERY HISTORY: gastric sleeve\par \par OBESITY COMORBIDITIES: DM (resolved), HTN (resolved), HLD (resolved)\par \par ANTI-OBESITY MEDICATIONS: bupropion (not currently taking), victoza (not taking post-op), phentermine, ozempic\par \par OBESITY MEDICATION SIDE EFFECTS: none\par \par  \par Plan: \par \par Continue current food plan. with Increased fiber, reduce snacking.  \par Use bike, and continue walking as tolerated\par cont phentermine 37.5\par increase ozempic to 2mg \par she understands that metabolism has slowed down and that 200 lb weight loss if sustained is an EXCELLENT result\par will continue to f/u on alternatives to manage loose skin in abdomen and arms\par consider re-eval of b/l foot neuropathy given that A1c was never particularly high\par \par rtc in 4 weeks

## 2022-06-02 ENCOUNTER — LABORATORY RESULT (OUTPATIENT)
Age: 52
End: 2022-06-02

## 2022-06-07 ENCOUNTER — RX RENEWAL (OUTPATIENT)
Age: 52
End: 2022-06-07

## 2022-06-07 RX ORDER — CHLORHEXIDINE GLUCONATE 4 %
1000 LIQUID (ML) TOPICAL
Qty: 90 | Refills: 1 | Status: ACTIVE | COMMUNITY
Start: 2019-03-11 | End: 1900-01-01

## 2022-06-27 ENCOUNTER — RX RENEWAL (OUTPATIENT)
Age: 52
End: 2022-06-27

## 2022-07-05 ENCOUNTER — APPOINTMENT (OUTPATIENT)
Dept: BARIATRICS/WEIGHT MGMT | Facility: CLINIC | Age: 52
End: 2022-07-05

## 2022-07-05 VITALS
BODY MASS INDEX: 40.29 KG/M2 | OXYGEN SATURATION: 98 % | WEIGHT: 236 LBS | DIASTOLIC BLOOD PRESSURE: 74 MMHG | SYSTOLIC BLOOD PRESSURE: 112 MMHG | HEIGHT: 64 IN | HEART RATE: 78 BPM

## 2022-07-05 PROCEDURE — 99213 OFFICE O/P EST LOW 20 MIN: CPT

## 2022-07-05 RX ORDER — ORAL SEMAGLUTIDE 3 MG/1
3 TABLET ORAL
Qty: 30 | Refills: 5 | Status: DISCONTINUED | COMMUNITY
Start: 2021-01-21 | End: 2022-07-05

## 2022-07-07 ENCOUNTER — LABORATORY RESULT (OUTPATIENT)
Age: 52
End: 2022-07-07

## 2022-07-07 NOTE — HISTORY OF PRESENT ILLNESS
[FreeTextEntry1] : 50 yo woman with past history of morbid obesity, dm2, s/p sleeve gastrectomy and >200 lb total weight loss\par \par doing ok with ozempic and phentermine now though weight has not changed much over past 2 years at this point\par \par Nonetheless her total weight loss is >200 lbs and she is not disappointed in overall progress\par \par still consumes a diet that is low luigi, protein sparing but relatively low in fiber\par \par foot numbness is near complete but ambulation still markedly improved with cane as opposed to walker

## 2022-07-07 NOTE — ASSESSMENT
[FreeTextEntry1] : BARIATRIC SURGERY HISTORY: gastric sleeve\par \par OBESITY COMORBIDITIES: DM (resolved), HTN (resolved), HLD (resolved)\par \par ANTI-OBESITY MEDICATIONS: bupropion (not currently taking), victoza (not taking post-op), phentermine, ozempic\par \par OBESITY MEDICATION SIDE EFFECTS: none\par \par  \par Plan: \par \par Continue current food plan. with Increased fiber, reduce snacking.  \par Use bike, and continue walking as tolerated\par cont phentermine 37.5\par increase ozempic to 2mg \par she understands that metabolism has slowed down and that 200 lb weight loss if sustained is an EXCELLENT result\par consider re-eval of b/l foot neuropathy given that A1c was never particularly high\par recommend she go back to working with RD on increased fiber intake\par \par rtc in 4 weeks

## 2022-07-14 ENCOUNTER — APPOINTMENT (OUTPATIENT)
Dept: INTERNAL MEDICINE | Facility: CLINIC | Age: 52
End: 2022-07-14

## 2022-07-14 VITALS
OXYGEN SATURATION: 95 % | WEIGHT: 237 LBS | TEMPERATURE: 97.1 F | DIASTOLIC BLOOD PRESSURE: 81 MMHG | HEIGHT: 64 IN | HEART RATE: 82 BPM | SYSTOLIC BLOOD PRESSURE: 113 MMHG | RESPIRATION RATE: 12 BRPM | BODY MASS INDEX: 40.46 KG/M2

## 2022-07-14 DIAGNOSIS — Z00.00 ENCOUNTER FOR GENERAL ADULT MEDICAL EXAMINATION W/OUT ABNORMAL FINDINGS: ICD-10-CM

## 2022-07-14 LAB
INR PPP: 2.4 RATIO
POCT-PROTHROMBIN TIME: 28.3 SECS
QUALITY CONTROL: YES

## 2022-07-14 PROCEDURE — G0439: CPT

## 2022-07-14 PROCEDURE — 85610 PROTHROMBIN TIME: CPT | Mod: QW

## 2022-07-14 RX ORDER — LIDOCAINE AND PRILOCAINE 25; 25 MG/G; MG/G
2.5-2.5 CREAM TOPICAL
Qty: 30 | Refills: 0 | Status: DISCONTINUED | COMMUNITY
Start: 2018-05-08 | End: 2022-07-14

## 2022-07-14 RX ORDER — HALOBETASOL PROPIONATE 0.5 MG/G
0.05 CREAM TOPICAL TWICE DAILY
Qty: 1 | Refills: 1 | Status: DISCONTINUED | COMMUNITY
Start: 2020-08-27 | End: 2022-07-14

## 2022-07-16 LAB
25(OH)D3 SERPL-MCNC: 38.4 NG/ML
ALBUMIN SERPL ELPH-MCNC: 4 G/DL
ALP BLD-CCNC: 63 U/L
ALT SERPL-CCNC: 36 U/L
ANION GAP SERPL CALC-SCNC: 11 MMOL/L
APPEARANCE: CLEAR
APTT BLD: 39.3 SEC
AST SERPL-CCNC: 36 U/L
BACTERIA: NEGATIVE
BASOPHILS # BLD AUTO: 0.03 K/UL
BASOPHILS NFR BLD AUTO: 0.5 %
BILIRUB SERPL-MCNC: 0.2 MG/DL
BILIRUBIN URINE: NEGATIVE
BLOOD URINE: NEGATIVE
BUN SERPL-MCNC: 20 MG/DL
CALCIUM SERPL-MCNC: 9.3 MG/DL
CHLORIDE SERPL-SCNC: 103 MMOL/L
CHOLEST SERPL-MCNC: 131 MG/DL
CO2 SERPL-SCNC: 26 MMOL/L
COLOR: YELLOW
COVID-19 NUCLEOCAPSID  GAM ANTIBODY INTERPRETATION: POSITIVE
COVID-19 SPIKE DOMAIN ANTIBODY INTERPRETATION: POSITIVE
CREAT SERPL-MCNC: 0.58 MG/DL
EGFR: 110 ML/MIN/1.73M2
EOSINOPHIL # BLD AUTO: 0.04 K/UL
EOSINOPHIL NFR BLD AUTO: 0.6 %
ESTIMATED AVERAGE GLUCOSE: 97 MG/DL
GLUCOSE QUALITATIVE U: NEGATIVE
GLUCOSE SERPL-MCNC: 83 MG/DL
HBA1C MFR BLD HPLC: 5 %
HCT VFR BLD CALC: 40.6 %
HDLC SERPL-MCNC: 56 MG/DL
HGB BLD-MCNC: 12.9 G/DL
HYALINE CASTS: 0 /LPF
IMM GRANULOCYTES NFR BLD AUTO: 0.2 %
INR PPP: 2.31 RATIO
KETONES URINE: NEGATIVE
LDLC SERPL CALC-MCNC: 64 MG/DL
LEUKOCYTE ESTERASE URINE: NEGATIVE
LYMPHOCYTES # BLD AUTO: 1.26 K/UL
LYMPHOCYTES NFR BLD AUTO: 20.1 %
MAN DIFF?: NORMAL
MCHC RBC-ENTMCNC: 29.1 PG
MCHC RBC-ENTMCNC: 31.8 GM/DL
MCV RBC AUTO: 91.4 FL
MICROSCOPIC-UA: NORMAL
MONOCYTES # BLD AUTO: 0.7 K/UL
MONOCYTES NFR BLD AUTO: 11.1 %
NEUTROPHILS # BLD AUTO: 4.24 K/UL
NEUTROPHILS NFR BLD AUTO: 67.5 %
NITRITE URINE: NEGATIVE
NONHDLC SERPL-MCNC: 76 MG/DL
PH URINE: 6.5
PLATELET # BLD AUTO: 189 K/UL
POTASSIUM SERPL-SCNC: 4.8 MMOL/L
PROT SERPL-MCNC: 7.9 G/DL
PROTEIN URINE: NORMAL
PT BLD: 27 SEC
RBC # BLD: 4.44 M/UL
RBC # FLD: 14.9 %
RED BLOOD CELLS URINE: 3 /HPF
SARS-COV-2 AB SERPL IA-ACNC: >250 U/ML
SARS-COV-2 AB SERPL QL IA: 10 INDEX
SODIUM SERPL-SCNC: 140 MMOL/L
SPECIFIC GRAVITY URINE: 1.03
SQUAMOUS EPITHELIAL CELLS: 1 /HPF
TRIGL SERPL-MCNC: 58 MG/DL
TSH SERPL-ACNC: 0.67 UIU/ML
UROBILINOGEN URINE: NORMAL
VIT B12 SERPL-MCNC: 964 PG/ML
WBC # FLD AUTO: 6.28 K/UL
WHITE BLOOD CELLS URINE: 1 /HPF

## 2022-07-26 ENCOUNTER — APPOINTMENT (OUTPATIENT)
Dept: INTERNAL MEDICINE | Facility: CLINIC | Age: 52
End: 2022-07-26

## 2022-07-28 ENCOUNTER — APPOINTMENT (OUTPATIENT)
Dept: INTERNAL MEDICINE | Facility: CLINIC | Age: 52
End: 2022-07-28

## 2022-07-28 VITALS
BODY MASS INDEX: 40.12 KG/M2 | HEIGHT: 64 IN | SYSTOLIC BLOOD PRESSURE: 136 MMHG | DIASTOLIC BLOOD PRESSURE: 77 MMHG | TEMPERATURE: 97.3 F | RESPIRATION RATE: 12 BRPM | OXYGEN SATURATION: 96 % | WEIGHT: 235 LBS | HEART RATE: 94 BPM

## 2022-07-28 DIAGNOSIS — Z79.01 LONG TERM (CURRENT) USE OF ANTICOAGULANTS: ICD-10-CM

## 2022-07-28 PROCEDURE — 99214 OFFICE O/P EST MOD 30 MIN: CPT

## 2022-08-04 ENCOUNTER — LABORATORY RESULT (OUTPATIENT)
Age: 52
End: 2022-08-04

## 2022-08-05 ENCOUNTER — RX RENEWAL (OUTPATIENT)
Age: 52
End: 2022-08-05

## 2022-08-15 NOTE — PHYSICAL EXAM
[Normal Sclera/Conjunctiva] : normal sclera/conjunctiva [Normal Outer Ear/Nose] : the outer ears and nose were normal in appearance [No JVD] : no jugular venous distention [No Lymphadenopathy] : no lymphadenopathy [Normal Rate] : normal rate  [Regular Rhythm] : with a regular rhythm [Normal S1, S2] : normal S1 and S2 [Soft] : abdomen soft [Non Tender] : non-tender [Non-distended] : non-distended [Normal Supraclavicular Nodes] : no supraclavicular lymphadenopathy [No CVA Tenderness] : no CVA  tenderness [No Joint Swelling] : no joint swelling [Speech Grossly Normal] : speech grossly normal [Alert and Oriented x3] : oriented to person, place, and time [Normal] : affect was normal and insight and judgment were intact [de-identified] : + murmur [de-identified] : b/l LE varicosities  [de-identified] : excess loose skin in abdomen, thighs and upper arms, + fungal dermatitis in folds of skin in abdomen  [de-identified] : hx of diabetic neuropathy, ambulating with cane

## 2022-08-15 NOTE — HISTORY OF PRESENT ILLNESS
[de-identified] : Ms. CONY HOUSTON is a 51 year old female, with PMHx of morbid obesity,S/P gastric sleeve surgery Nov 2017-has lost about 190 lbs), T2DM (diet controlled since having bariatric surgery) hypothyroidism, chronic peripheral neuropathy, severe YVONNE, PE X 2 (VTE) on chronic Coumadin, ambulatory dysfunction secondary to diabetic neuropathy -uses cane, walker and wheelchair, presents for physical \par \par She feels well\par She does endorse chronic numbness/tingling in her feet from the diabetic neuropathy\par She is doing well with her diet \par Denies any CP, SOB, HA, N/V or abdominal pain \par

## 2022-08-15 NOTE — ASSESSMENT
[FreeTextEntry1] : \par \par fungal dermatitis\par lotrisone cream BUD\par \par Lab results reviewed and discussed with patient\par \par \par Obesity -S/P gastric sleeve surgery Nov 2017\par cont current supplements \par \par Diabetes- Diet control after bariatric surgery \par \par Chronic peripheral neuropathy, severe YVONNE, ambulatory dysfunction secondary to diabetic neuropathy\par cont Lyrica 75 mg TID- renewed today\par cont Percocet 5- 325 mg Every 6 hours as needed\par \par Hypothyroidism\par cont Synthroid 137 mcg daily\par \par HTN/HLD\par Low sodium, low cholesterol diet/ increased physical activity \par cont Enalapril 2,5 mg daily\par cont Atorvastatin 40 mg daily\par \par Hx of PE X 2 (VTE)\par on Coumadin 5mg Tuesday & Wednesday and Coumadin 7.5mg the other days\par INR today - 2.31\par \par follow up in one week for lab results\par

## 2022-08-15 NOTE — HISTORY OF PRESENT ILLNESS
[de-identified] : Ms. CONY HOUSTON is a 51 year old female, with history  of morbid obesity,S/P gastric sleeve surgery Nov 2017 -has lost about 190 lbs),  T2DM ( not on medications)  hypothyroidism, chronic peripheral neuropathy, severe YVONNE, PE X 2 (VTE) on chronic Coumadin, ambulatory dysfunction secondary to diabetic neuropathy- uses cane, and uses walker if she has to walk longer distances, presents for follow up visit, lab results, Rx renewals and forms to be filled out\par She c/o itchy rash in her lower abdomen and in her upper arms. Says she always gets a rash on the folds of her skin in her abdomen and in her upper arms- worse when it's hot out.\par \par \par Denies SOB, chest pain, abdominal pain, N/V/D, leg swelling, headache, dizziness \par \par \par \par

## 2022-08-15 NOTE — ASSESSMENT
[FreeTextEntry1] : Physical \par \par Referral for GYN and GI \par \par \par Obesity -S/P gastric sleeve surgery Nov 2017\par cont current supplements \par \par Diabetes- Diet control after bariatric surgery \par \par Chronic peripheral neuropathy, severe YVONNE, ambulatory dysfunction secondary to diabetic neuropathy\par cont Lyrica 75 mg TID \par cont Percocet 5- 325 mg Every 6 hours as needed\par \par Hypothyroidism\par cont Synthroid 137 mcg daily\par \par HTN/HLD\par Low sodium, low cholesterol diet/ increased physical activity \par cont Enalapril 2,5 mg daily\par cont Atorvastatin 40 mg daily\par \par Hx of PE X 2 (VTE)\par on Coumadin 5mg Tuesday & Wednesday and Coumadin 7.5mg the other days\par INR today - 2.4 \par \par follow up in one week for lab results\par

## 2022-08-15 NOTE — REVIEW OF SYSTEMS
[Negative] : Heme/Lymph [FreeTextEntry9] : ambulatory dysfunction [de-identified] : see hpi [de-identified] : chronic neuropathy b/l feet

## 2022-08-15 NOTE — PHYSICAL EXAM
[No Acute Distress] : no acute distress [Well Nourished] : well nourished [Normal Sclera/Conjunctiva] : normal sclera/conjunctiva [EOMI] : extraocular movements intact [Normal Outer Ear/Nose] : the outer ears and nose were normal in appearance [Normal Oropharynx] : the oropharynx was normal [No JVD] : no jugular venous distention [No Lymphadenopathy] : no lymphadenopathy [Supple] : supple [Thyroid Normal, No Nodules] : the thyroid was normal and there were no nodules present [No Respiratory Distress] : no respiratory distress  [No Accessory Muscle Use] : no accessory muscle use [Clear to Auscultation] : lungs were clear to auscultation bilaterally [Normal Rate] : normal rate  [Regular Rhythm] : with a regular rhythm [Normal S1, S2] : normal S1 and S2 [No Carotid Bruits] : no carotid bruits [Pedal Pulses Present] : the pedal pulses are present [No Edema] : there was no peripheral edema [No Extremity Clubbing/Cyanosis] : no extremity clubbing/cyanosis [Soft] : abdomen soft [Non Tender] : non-tender [Non-distended] : non-distended [No Masses] : no abdominal mass palpated [No HSM] : no HSM [Normal Bowel Sounds] : normal bowel sounds [Normal Posterior Cervical Nodes] : no posterior cervical lymphadenopathy [Normal Anterior Cervical Nodes] : no anterior cervical lymphadenopathy [No CVA Tenderness] : no CVA  tenderness [No Spinal Tenderness] : no spinal tenderness [No Joint Swelling] : no joint swelling [Grossly Normal Strength/Tone] : grossly normal strength/tone [No Rash] : no rash [Coordination Grossly Intact] : coordination grossly intact [No Focal Deficits] : no focal deficits [Normal Affect] : the affect was normal [Normal Insight/Judgement] : insight and judgment were intact [de-identified] : + Murmur  [de-identified] : + varicose and Spider veins  [de-identified] : ambulates with cane, Chronic neuropathy b/l feet

## 2022-08-15 NOTE — HEALTH RISK ASSESSMENT
[Good] : ~his/her~  mood as  good [No] : No [No falls in past year] : Patient reported no falls in the past year [0] : 2) Feeling down, depressed, or hopeless: Not at all (0) [Patient reported mammogram was normal] : Patient reported mammogram was normal [Patient reported PAP Smear was normal] : Patient reported PAP Smear was normal [None] : None [Alone] : lives alone [Unemployed] : unemployed [Single] : single [PapSmearDate] : 2-3 years  [MammogramDate] : 02/2022 [ColonoscopyDate] : Never

## 2022-08-30 ENCOUNTER — APPOINTMENT (OUTPATIENT)
Dept: INTERNAL MEDICINE | Facility: CLINIC | Age: 52
End: 2022-08-30

## 2022-08-30 VITALS
HEART RATE: 77 BPM | DIASTOLIC BLOOD PRESSURE: 72 MMHG | RESPIRATION RATE: 12 BRPM | BODY MASS INDEX: 40.12 KG/M2 | SYSTOLIC BLOOD PRESSURE: 106 MMHG | HEIGHT: 64 IN | OXYGEN SATURATION: 96 % | WEIGHT: 235 LBS | TEMPERATURE: 98.2 F

## 2022-08-30 PROCEDURE — 99214 OFFICE O/P EST MOD 30 MIN: CPT

## 2022-08-30 NOTE — ASSESSMENT
[FreeTextEntry1] : \par \par fungal dermatitis in skin folds of lower abdomen and upper arms\par cont lotrisone cream BID-renewed today\par reccomended she see surgeon for removal of excess skin-referral to plastic Sx given\par \par Obesity -S/P gastric sleeve surgery Nov 2017\par cont current supplements \par \par Diabetes- Diet control after bariatric surgery \par \par Chronic peripheral neuropathy, severe YVONNE, ambulatory dysfunction secondary to diabetic neuropathy\par cont Lyrica 75 mg TID- renewed today\par cont Percocet 5- 325 mg Every 6 hours as needed- renewed today\par \par Hypothyroidism\par cont Synthroid 137 mcg daily\par \par HTN/HLD\par Low sodium, low cholesterol diet/ increased physical activity \par cont Enalapril 2,5 mg daily\par cont Atorvastatin 40 mg daily\par \par Hx of PE X 2 (VTE)\par cont Coumadin 5mg Tuesday & Wednesday and Coumadin 7.5mg the other days\par \par

## 2022-08-30 NOTE — PHYSICAL EXAM
[Normal Sclera/Conjunctiva] : normal sclera/conjunctiva [Normal Outer Ear/Nose] : the outer ears and nose were normal in appearance [No JVD] : no jugular venous distention [No Lymphadenopathy] : no lymphadenopathy [Normal Rate] : normal rate  [Regular Rhythm] : with a regular rhythm [Normal S1, S2] : normal S1 and S2 [Soft] : abdomen soft [Non Tender] : non-tender [Non-distended] : non-distended [Normal Supraclavicular Nodes] : no supraclavicular lymphadenopathy [No CVA Tenderness] : no CVA  tenderness [No Joint Swelling] : no joint swelling [Speech Grossly Normal] : speech grossly normal [Alert and Oriented x3] : oriented to person, place, and time [Normal] : affect was normal and insight and judgment were intact [de-identified] : + murmur [de-identified] : b/l LE varicosities  [de-identified] : excess loose skin in abdomen, thighs and upper arms, + fungal dermatitis in folds of skin in abdomen  [de-identified] : hx of diabetic neuropathy, ambulating with cane

## 2022-08-30 NOTE — REVIEW OF SYSTEMS
[Negative] : Heme/Lymph [FreeTextEntry9] : ambulatory dysfunction [de-identified] : see hpi [de-identified] : chronic neuropathy b/l feet

## 2022-08-30 NOTE — HISTORY OF PRESENT ILLNESS
[de-identified] : Ms. CONY HOUSTON is a 51 year old female, with history  of morbid obesity,S/P gastric sleeve surgery Nov 2017 -has lost about 190 lbs),  T2DM ( not on medications)  hypothyroidism, chronic peripheral neuropathy, severe YVONNE, PE X 2 (VTE) on chronic Coumadin, ambulatory dysfunction secondary to diabetic neuropathy- uses cane, and uses walker if she has to walk longer distances, presents for follow up visit\par She says she has the rash again in her lower abdomen and in her upper arms where the skin folds\par It got a better after she used the cream, it did not go completely away. \par Denies SOB, chest pain, abdominal pain, N/V/D,  headache, dizziness \par \par \par \par

## 2022-09-01 ENCOUNTER — LABORATORY RESULT (OUTPATIENT)
Age: 52
End: 2022-09-01

## 2022-09-06 ENCOUNTER — APPOINTMENT (OUTPATIENT)
Dept: BARIATRICS/WEIGHT MGMT | Facility: CLINIC | Age: 52
End: 2022-09-06

## 2022-09-06 VITALS
HEIGHT: 64 IN | SYSTOLIC BLOOD PRESSURE: 120 MMHG | HEART RATE: 87 BPM | WEIGHT: 232 LBS | DIASTOLIC BLOOD PRESSURE: 70 MMHG | BODY MASS INDEX: 39.61 KG/M2 | OXYGEN SATURATION: 97 %

## 2022-09-06 PROCEDURE — 99213 OFFICE O/P EST LOW 20 MIN: CPT

## 2022-09-06 NOTE — HISTORY OF PRESENT ILLNESS
[FreeTextEntry1] : 53 yo woman with past history of morbid obesity, dm2, s/p sleeve gastrectomy and 200 lb total weight loss returns for obesity mecicine f/u\par \par tolerating ozempic 2mg\par tolerating phentermine 37.5mg\par has lost 4 lbs over past month\par \par weight loss in the 200 lb range\par \par still consumes a diet that is low luigi, protein sparing but relatively low in fiber\par \par foot numbness is near complete\par \par she continues working with PCP and plastic surgery to get coverage for post-weight loss procedures (abdomen and arms)\par \par otherwise no new complaints today

## 2022-09-06 NOTE — ASSESSMENT
[FreeTextEntry1] : BARIATRIC SURGERY HISTORY: gastric sleeve\par \par OBESITY COMORBIDITIES: DM (resolved), HTN (resolved), HLD (resolved)\par \par ANTI-OBESITY MEDICATIONS: bupropion (not currently taking), victoza (no longer taking), phentermine, ozempic\par \par OBESITY MEDICATION SIDE EFFECTS: none\par \par  \par Plan: \par \par Continue current food plan. with Increased fiber, reduce snacking.  \par Use bike, and continue walking as tolerated\par still recommend re-consultation with neurology regarding neuropathy\par cont phentermine 37.5\par cont ozempic  2mg  \par cont to work with PCP and plastic surgery regarding coverage for paniculectomy +/- removal of excess arm skin\par \par rtc in 4-8 weeks

## 2022-10-03 ENCOUNTER — MED ADMIN CHARGE (OUTPATIENT)
Age: 52
End: 2022-10-03

## 2022-10-03 ENCOUNTER — APPOINTMENT (OUTPATIENT)
Dept: INTERNAL MEDICINE | Facility: CLINIC | Age: 52
End: 2022-10-03

## 2022-10-03 VITALS
WEIGHT: 234 LBS | HEIGHT: 64 IN | BODY MASS INDEX: 39.95 KG/M2 | DIASTOLIC BLOOD PRESSURE: 82 MMHG | RESPIRATION RATE: 12 BRPM | TEMPERATURE: 98.1 F | HEART RATE: 76 BPM | SYSTOLIC BLOOD PRESSURE: 130 MMHG | OXYGEN SATURATION: 96 %

## 2022-10-03 DIAGNOSIS — G62.9 POLYNEUROPATHY, UNSPECIFIED: ICD-10-CM

## 2022-10-03 DIAGNOSIS — L30.9 DERMATITIS, UNSPECIFIED: ICD-10-CM

## 2022-10-03 PROCEDURE — 85610 PROTHROMBIN TIME: CPT | Mod: QW

## 2022-10-03 PROCEDURE — 90682 RIV4 VACC RECOMBINANT DNA IM: CPT

## 2022-10-03 PROCEDURE — 99214 OFFICE O/P EST MOD 30 MIN: CPT

## 2022-10-03 PROCEDURE — G0008: CPT

## 2022-10-05 ENCOUNTER — LABORATORY RESULT (OUTPATIENT)
Age: 52
End: 2022-10-05

## 2022-10-11 NOTE — H&P PST ADULT - MAMMOGRAM, LAST, PROFILE
2016
Partially impaired: cannot see medication labels or newsprint, but can see obstacles in path, and the surrounding layout; can count fingers at arm's length

## 2022-10-23 NOTE — REVIEW OF SYSTEMS
[Negative] : Heme/Lymph [FreeTextEntry9] : ambulatory dysfunction [de-identified] : see hpi [de-identified] : chronic neuropathy b/l feet

## 2022-10-23 NOTE — ASSESSMENT
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\par cont lotrisone cream BID\par has appt' with plastic Sx in Dec to discuss possible panniculectomy \par \par Obesity -S/P gastric sleeve surgery Nov 2017\par cont current supplements \par \par Diabetes- Diet control after bariatric surgery \par \par Chronic peripheral neuropathy, severe YVONNE, ambulatory dysfunction secondary to diabetic neuropathy\par cont Lyrica 75 mg TID- renewed today\par cont Percocet 5- 325 mg Every 6 hours as needed- renewed today\par \par Hypothyroidism\par cont Synthroid 137 mcg daily\par \par HTN/HLD\par Low sodium, low cholesterol diet/ increased physical activity \par cont Enalapril 2,5 mg daily\par cont Atorvastatin 40 mg daily\par \par Hx of PE X 2 (VTE)\par cont Coumadin 5mg Tuesday & Wednesday and Coumadin 7.5mg the other days\par INR today \par \par flu vaccine administered\par \par

## 2022-10-23 NOTE — PHYSICAL EXAM
[Normal Sclera/Conjunctiva] : normal sclera/conjunctiva [Normal Outer Ear/Nose] : the outer ears and nose were normal in appearance [No JVD] : no jugular venous distention [No Lymphadenopathy] : no lymphadenopathy [Normal Rate] : normal rate  [Regular Rhythm] : with a regular rhythm [Normal S1, S2] : normal S1 and S2 [Soft] : abdomen soft [Non Tender] : non-tender [Non-distended] : non-distended [Normal Supraclavicular Nodes] : no supraclavicular lymphadenopathy [No CVA Tenderness] : no CVA  tenderness [No Joint Swelling] : no joint swelling [Speech Grossly Normal] : speech grossly normal [Alert and Oriented x3] : oriented to person, place, and time [Normal] : affect was normal and insight and judgment were intact [de-identified] : + murmur [de-identified] : b/l LE varicosities  [de-identified] : excess loose skin in abdomen, thighs and upper arms, + fungal dermatitis in folds of skin in abdomen  [de-identified] : hx of diabetic neuropathy, ambulating with cane

## 2022-10-23 NOTE — HISTORY OF PRESENT ILLNESS
[de-identified] : Ms. CONY HOUSTON is a 51 year old female, with history  of morbid obesity,S/P gastric sleeve surgery Nov 2017 -has lost about 190 lbs),  T2DM ( not on medications)  hypothyroidism, chronic peripheral neuropathy, severe YVONNE, PE X 2 (VTE) on chronic Coumadin, ambulatory dysfunction secondary to diabetic neuropathy- uses cane, and uses walker if she has to walk longer distances, presents for follow up visit\par She continues to c/o having itchy rash in her lower abdomen / skin folds and in her upper arms- worse in lower abdomen\par She is using the creams which help but rash never really goes completely away\par She has an georgi't in December to see plastic surgeon to discuss paniculectomy surgery \par Denies SOB, chest pain, abdominal pain, N/V/D,  headache, dizziness \par \par \par \par

## 2022-10-25 ENCOUNTER — NON-APPOINTMENT (OUTPATIENT)
Age: 52
End: 2022-10-25

## 2022-10-25 ENCOUNTER — APPOINTMENT (OUTPATIENT)
Dept: INTERNAL MEDICINE | Facility: CLINIC | Age: 52
End: 2022-10-25

## 2022-10-25 VITALS
RESPIRATION RATE: 12 BRPM | HEART RATE: 82 BPM | OXYGEN SATURATION: 96 % | WEIGHT: 235 LBS | SYSTOLIC BLOOD PRESSURE: 121 MMHG | TEMPERATURE: 98 F | DIASTOLIC BLOOD PRESSURE: 74 MMHG | HEIGHT: 64 IN | BODY MASS INDEX: 40.12 KG/M2

## 2022-10-25 PROCEDURE — 99214 OFFICE O/P EST MOD 30 MIN: CPT | Mod: 25

## 2022-10-25 PROCEDURE — 93000 ELECTROCARDIOGRAM COMPLETE: CPT

## 2022-11-03 LAB
ALBUMIN SERPL ELPH-MCNC: 4.3 G/DL
ALP BLD-CCNC: 66 U/L
ALT SERPL-CCNC: 44 U/L
ANION GAP SERPL CALC-SCNC: 10 MMOL/L
APTT BLD: 39 SEC
AST SERPL-CCNC: 37 U/L
BASOPHILS # BLD AUTO: 0.03 K/UL
BASOPHILS NFR BLD AUTO: 0.6 %
BILIRUB SERPL-MCNC: 0.2 MG/DL
BUN SERPL-MCNC: 20 MG/DL
CALCIUM SERPL-MCNC: 9 MG/DL
CHLORIDE SERPL-SCNC: 106 MMOL/L
CO2 SERPL-SCNC: 25 MMOL/L
CREAT SERPL-MCNC: 0.58 MG/DL
EGFR: 109 ML/MIN/1.73M2
EOSINOPHIL # BLD AUTO: 0.06 K/UL
EOSINOPHIL NFR BLD AUTO: 1.1 %
ESTIMATED AVERAGE GLUCOSE: 91 MG/DL
GLUCOSE SERPL-MCNC: 74 MG/DL
HBA1C MFR BLD HPLC: 4.8 %
HCT VFR BLD CALC: 40.6 %
HGB BLD-MCNC: 12.7 G/DL
IMM GRANULOCYTES NFR BLD AUTO: 0.2 %
INR PPP: 2.28 RATIO
LYMPHOCYTES # BLD AUTO: 1.24 K/UL
LYMPHOCYTES NFR BLD AUTO: 23 %
MAN DIFF?: NORMAL
MCHC RBC-ENTMCNC: 28.5 PG
MCHC RBC-ENTMCNC: 31.3 GM/DL
MCV RBC AUTO: 91 FL
MONOCYTES # BLD AUTO: 0.57 K/UL
MONOCYTES NFR BLD AUTO: 10.6 %
NEUTROPHILS # BLD AUTO: 3.49 K/UL
NEUTROPHILS NFR BLD AUTO: 64.5 %
PLATELET # BLD AUTO: 180 K/UL
POTASSIUM SERPL-SCNC: 4.5 MMOL/L
PROT SERPL-MCNC: 7.3 G/DL
PT BLD: 26.7 SEC
RBC # BLD: 4.46 M/UL
RBC # FLD: 14.7 %
SODIUM SERPL-SCNC: 141 MMOL/L
WBC # FLD AUTO: 5.4 K/UL

## 2022-11-06 NOTE — ASSESSMENT
[Patient Optimized for Surgery] : Patient optimized for surgery [Modify anticoagulant treatment prior to procedure] : Modify anticoagulant treatment prior to procedure [FreeTextEntry5] : stop coumadin 5 days before procedure

## 2022-11-06 NOTE — HISTORY OF PRESENT ILLNESS
[No Adverse Anesthesia Reaction] : no adverse anesthesia reaction in self or family member [Chronic Anticoagulation] : chronic anticoagulation [Diabetes] : diabetes [Aortic Stenosis] : no aortic stenosis [Coronary Artery Disease] : no coronary artery disease [Atrial Fibrillation] : no atrial fibrillation [Recent Myocardial Infarction] : no recent myocardial infarction [Implantable Device/Pacemaker] : no implantable device/pacemaker [Asthma] : no asthma [COPD] : no COPD [Sleep Apnea] : no sleep apnea [Smoker] : not a smoker [Family Member] : no family member with adverse anesthesia reaction/sudden death [Self] : no previous adverse anesthesia reaction [Chronic Kidney Disease] : no chronic kidney disease [FreeTextEntry1] : Right leg vein stripping  [FreeTextEntry2] : 10/27/2022 [FreeTextEntry3] : Dr. Benitez  [FreeTextEntry4] : Ms. CONY HOUSTON is a 52 year old female, with history of morbid obesity,S/P gastric sleeve surgery Nov 2017 -has lost about 190 lbs), T2DM ( not on medications) hypothyroidism, chronic peripheral neuropathy, severe YVONNE, PE X 2 (VTE) on chronic Coumadin, ambulatory dysfunction secondary to diabetic neuropathy- uses cane, and uses walker if she has to walk longer distances, presents for medical clearance \par \par She feels well \par Denies any CP, SOB, HA, N/V or abdominal pain

## 2022-11-06 NOTE — PHYSICAL EXAM
[No Acute Distress] : no acute distress [Well Nourished] : well nourished [Normal Sclera/Conjunctiva] : normal sclera/conjunctiva [EOMI] : extraocular movements intact [Normal Outer Ear/Nose] : the outer ears and nose were normal in appearance [Normal Oropharynx] : the oropharynx was normal [No JVD] : no jugular venous distention [No Lymphadenopathy] : no lymphadenopathy [Supple] : supple [Thyroid Normal, No Nodules] : the thyroid was normal and there were no nodules present [No Respiratory Distress] : no respiratory distress  [No Accessory Muscle Use] : no accessory muscle use [Clear to Auscultation] : lungs were clear to auscultation bilaterally [Normal Rate] : normal rate  [Regular Rhythm] : with a regular rhythm [Normal S1, S2] : normal S1 and S2 [No Carotid Bruits] : no carotid bruits [Pedal Pulses Present] : the pedal pulses are present [No Edema] : there was no peripheral edema [No Extremity Clubbing/Cyanosis] : no extremity clubbing/cyanosis [Soft] : abdomen soft [Non Tender] : non-tender [Non-distended] : non-distended [No Masses] : no abdominal mass palpated [No HSM] : no HSM [Normal Bowel Sounds] : normal bowel sounds [Normal Posterior Cervical Nodes] : no posterior cervical lymphadenopathy [Normal Anterior Cervical Nodes] : no anterior cervical lymphadenopathy [No CVA Tenderness] : no CVA  tenderness [No Spinal Tenderness] : no spinal tenderness [No Joint Swelling] : no joint swelling [Grossly Normal Strength/Tone] : grossly normal strength/tone [No Rash] : no rash [Coordination Grossly Intact] : coordination grossly intact [No Focal Deficits] : no focal deficits [Normal Affect] : the affect was normal [Normal Insight/Judgement] : insight and judgment were intact [de-identified] : + Murmur  [de-identified] : + varicose and Spider veins  [de-identified] : ambulates with cane, Chronic neuropathy b/l feet

## 2022-11-09 ENCOUNTER — APPOINTMENT (OUTPATIENT)
Dept: BARIATRICS/WEIGHT MGMT | Facility: CLINIC | Age: 52
End: 2022-11-09

## 2022-11-09 DIAGNOSIS — L98.7 EXCESSIVE AND REDUNDANT SKIN AND SUBCUTANEOUS TISSUE: ICD-10-CM

## 2022-11-09 PROBLEM — Z13.21 ENCOUNTER FOR VITAMIN DEFICIENCY SCREENING: Status: ACTIVE | Noted: 2022-11-09

## 2022-11-09 PROCEDURE — 99214 OFFICE O/P EST MOD 30 MIN: CPT | Mod: 95

## 2022-11-10 ENCOUNTER — APPOINTMENT (OUTPATIENT)
Dept: SURGERY | Facility: CLINIC | Age: 52
End: 2022-11-10

## 2022-11-10 VITALS
DIASTOLIC BLOOD PRESSURE: 78 MMHG | WEIGHT: 235.5 LBS | HEIGHT: 64 IN | BODY MASS INDEX: 40.2 KG/M2 | SYSTOLIC BLOOD PRESSURE: 121 MMHG | TEMPERATURE: 97.2 F | OXYGEN SATURATION: 96 % | HEART RATE: 85 BPM

## 2022-11-10 DIAGNOSIS — Z13.21 ENCOUNTER FOR SCREENING FOR NUTRITIONAL DISORDER: ICD-10-CM

## 2022-11-10 DIAGNOSIS — Z98.84 BARIATRIC SURGERY STATUS: ICD-10-CM

## 2022-11-10 PROCEDURE — 99212 OFFICE O/P EST SF 10 MIN: CPT

## 2022-11-10 NOTE — ASSESSMENT
[___ Years Post Op] : [unfilled] years [Previous Visit Weight: ___] : Previous visit weight: [unfilled] lbs [Cormobidities: ___] : Comorbidities: [unfilled] [Today's Weight: ___] : Today's weight:[unfilled] lbs [Today's BMI: ___] : Today's BMI: [unfilled] [de-identified] : Plan:\par Continue high-protein low-carb diet\par Increase activities as tolerated\par Check blood work\par Continue vitamins daily\par Follow-up 6 months\par Patient clearly would benefit from panniculectomy in terms of her walking her back pain and her hygiene.

## 2022-11-10 NOTE — PHYSICAL EXAM
[de-identified] : anicteric, mucous membranes moist  [de-identified] : CTA [de-identified] : RRR [de-identified] : abdomen soft nontender nondistended [de-identified] : healed  [de-identified] : discolored & itchy large overhanging with superficial ulceration

## 2022-11-10 NOTE — HISTORY OF PRESENT ILLNESS
[Procedure: ___] : Procedure performed: [unfilled]  [Date of Surgery: ___] : Date of Surgery:   [unfilled] [Surgeon Name:   ___] : Surgeon Name: Dr. NGUYỄN [Pre-Op Weight ___] : Pre-op weight was [unfilled] lbs [___ Years Post Op] : [unfilled] years [Diabetes] : Diabetes [Hypertension] : Hypertension [Sleep Apnea] : Sleep Apnea [Hyperlipidemia] : Hyperlipidemia [de-identified] : Following obesity medicine, Dr. Miller. Currently on phentermine and ozempic \par Had surgery scheduled for panniculectomy, but was cancelled due to insurance denial \par Very happy with results to date\par Needs panniculectomy to improve ambulation as well as hygiene\par  [GERD] : no GERD

## 2022-11-15 PROBLEM — L98.7 EXCESS SKIN OF ARM: Status: ACTIVE | Noted: 2022-08-30

## 2022-11-15 NOTE — REASON FOR VISIT
[Follow-Up Visit] : a follow-up visit for [Obesity] : obesity [Home] : at home, [unfilled] , at the time of the visit. [Other Location: e.g. Home (Enter Location, City,State)___] : at [unfilled] [Patient] : the patient

## 2022-11-15 NOTE — ASSESSMENT
[FreeTextEntry1] : BARIATRIC SURGERY HISTORY: gastric sleeve\par \par OBESITY COMORBIDITIES: DM (resolved), HTN (resolved), HLD (resolved)\par \par ANTI-OBESITY MEDICATIONS: bupropion (not currently taking), victoza (no longer taking), phentermine, ozempic\par \par OBESITY MEDICATION SIDE EFFECTS: none\par \par  \par Plan: \par \par Continue current food plan. with Increased fiber, reduce snacking.  \par Use bike, and continue walking as tolerated\par cont phentermine 37.5\par cont ozempic  2mg  \par will be re-evaluated for post-weight loss plastic surgery.  with 2 issues:\par \par 1. Panniculatectomy: this would be required for both hygeine and mobility given excess skin\par 2. also should have revision of excess arm skin given repeated fungal infections and pain associated with loose and hanging excess skin\par \par rtc in 4-8 weeks

## 2022-11-15 NOTE — HISTORY OF PRESENT ILLNESS
[FreeTextEntry1] : 51 yo woman with past history of morbid obesity, dm2, s/p sleeve gastrectomy and 200 lb total weight loss returns for obesity mecicine f/u\par \par tolerating ozempic 2mg\par tolerating phentermine 37.5mg\par weight remains stable in low 230s.  \par weight loss in the 200 lb range\par weight has been 233 lbs +/- 3 lbs for 3 consecutive years\par \par still consumes a diet that is low luigi, protein sparing but relatively low in fiber\par \par she is being re-evaluated for post-weight loss bariatric surgery.  she has lost >200 lbs, but weight is stable.  For hygeine, mobility and pain issues she needs panniculectomy in addition to removal of excess skin from arms\par \par \par

## 2022-11-16 ENCOUNTER — APPOINTMENT (OUTPATIENT)
Dept: INTERNAL MEDICINE | Facility: CLINIC | Age: 52
End: 2022-11-16

## 2022-11-16 VITALS
RESPIRATION RATE: 14 BRPM | WEIGHT: 235 LBS | OXYGEN SATURATION: 95 % | HEART RATE: 100 BPM | SYSTOLIC BLOOD PRESSURE: 136 MMHG | TEMPERATURE: 98.1 F | BODY MASS INDEX: 40.12 KG/M2 | DIASTOLIC BLOOD PRESSURE: 80 MMHG | HEIGHT: 64 IN

## 2022-11-16 DIAGNOSIS — D50.9 IRON DEFICIENCY ANEMIA, UNSPECIFIED: ICD-10-CM

## 2022-11-16 DIAGNOSIS — F32.A DEPRESSION, UNSPECIFIED: ICD-10-CM

## 2022-11-16 DIAGNOSIS — L98.7 EXCESSIVE AND REDUNDANT SKIN AND SUBCUTANEOUS TISSUE: ICD-10-CM

## 2022-11-16 DIAGNOSIS — E11.42 TYPE 2 DIABETES MELLITUS WITH DIABETIC POLYNEUROPATHY: ICD-10-CM

## 2022-11-16 DIAGNOSIS — B36.9 SUPERFICIAL MYCOSIS, UNSPECIFIED: ICD-10-CM

## 2022-11-16 PROCEDURE — 99214 OFFICE O/P EST MOD 30 MIN: CPT

## 2022-11-16 RX ORDER — CHLORHEXIDINE GLUCONATE 4 %
325 (65 FE) LIQUID (ML) TOPICAL
Qty: 270 | Refills: 1 | Status: DISCONTINUED | COMMUNITY
Start: 2020-02-21 | End: 2022-11-16

## 2022-11-16 RX ORDER — FOLIC ACID 1 MG/1
1 TABLET ORAL
Qty: 90 | Refills: 1 | Status: ACTIVE | COMMUNITY
Start: 2020-02-21 | End: 1900-01-01

## 2022-12-01 LAB
ALBUMIN SERPL ELPH-MCNC: 3.9 G/DL
ALP BLD-CCNC: 67 U/L
ALT SERPL-CCNC: 36 U/L
ANION GAP SERPL CALC-SCNC: 10 MMOL/L
AST SERPL-CCNC: 32 U/L
BASOPHILS # BLD AUTO: 0.03 K/UL
BASOPHILS NFR BLD AUTO: 0.6 %
BILIRUB SERPL-MCNC: 0.2 MG/DL
BUN SERPL-MCNC: 19 MG/DL
CALCIUM SERPL-MCNC: 8.9 MG/DL
CHLORIDE SERPL-SCNC: 106 MMOL/L
CHOLEST SERPL-MCNC: 127 MG/DL
CO2 SERPL-SCNC: 27 MMOL/L
CREAT SERPL-MCNC: 0.61 MG/DL
EGFR: 108 ML/MIN/1.73M2
EOSINOPHIL # BLD AUTO: 0.08 K/UL
EOSINOPHIL NFR BLD AUTO: 1.5 %
ESTIMATED AVERAGE GLUCOSE: 91 MG/DL
GLUCOSE SERPL-MCNC: 83 MG/DL
HBA1C MFR BLD HPLC: 4.8 %
HCT VFR BLD CALC: 41.6 %
HDLC SERPL-MCNC: 53 MG/DL
HGB BLD-MCNC: 12.8 G/DL
IMM GRANULOCYTES NFR BLD AUTO: 0.2 %
LDLC SERPL CALC-MCNC: 59 MG/DL
LYMPHOCYTES # BLD AUTO: 1.04 K/UL
LYMPHOCYTES NFR BLD AUTO: 19.7 %
MAN DIFF?: NORMAL
MCHC RBC-ENTMCNC: 28.3 PG
MCHC RBC-ENTMCNC: 30.8 GM/DL
MCV RBC AUTO: 91.8 FL
MONOCYTES # BLD AUTO: 0.56 K/UL
MONOCYTES NFR BLD AUTO: 10.6 %
NEUTROPHILS # BLD AUTO: 3.57 K/UL
NEUTROPHILS NFR BLD AUTO: 67.4 %
NONHDLC SERPL-MCNC: 74 MG/DL
PLATELET # BLD AUTO: 209 K/UL
POTASSIUM SERPL-SCNC: 4.8 MMOL/L
PROT SERPL-MCNC: 7.1 G/DL
RBC # BLD: 4.53 M/UL
RBC # FLD: 14.4 %
SODIUM SERPL-SCNC: 143 MMOL/L
TRIGL SERPL-MCNC: 77 MG/DL
TSH SERPL-ACNC: 0.71 UIU/ML
WBC # FLD AUTO: 5.29 K/UL

## 2022-12-04 NOTE — REVIEW OF SYSTEMS
[Negative] : Heme/Lymph [FreeTextEntry9] : ambulatory dysfunction [de-identified] : see hpi [de-identified] : chronic neuropathy b/l feet

## 2022-12-04 NOTE — ASSESSMENT
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\par cont lotrisone cream BID\par has appt' with plastic Sx  Dec 4th to discuss possible panniculectomy \par \par Diabetes- Diet control after bariatric surgery \par \par Chronic peripheral neuropathy, severe YVONNE, ambulatory dysfunction secondary to diabetic neuropathy\par cont Lyrica 75 mg TID- renewed today\par cont Percocet 5- 325 mg Every 6 hours as needed- renewed today\par \par Hypothyroidism\par cont Synthroid 137 mcg daily\par \par DM II\par on Ozempic\par cont low sugar / low carb diet\par \par HTN/HLD\par Low sodium, low cholesterol diet/ increased physical activity \par cont Enalapril 2,5 mg daily\par cont Atorvastatin 40 mg daily\par \par Hx of PE X 2 (VTE)\par cont Coumadin 5mg Tuesday & Wednesday and Coumadin 7.5mg the other days\par INR today \par \par all meds renewed- see plan\par \par blood work ordered\par \par follow up in one week for lab results\par \par \par

## 2022-12-04 NOTE — HISTORY OF PRESENT ILLNESS
[de-identified] : Ms. CONY HOUSTON is a 52 year old female, with history  of morbid obesity,S/P gastric sleeve surgery Nov 2017 -has lost about 190 lbs),  T2DM, hypothyroidism, chronic peripheral neuropathy, severe YVONNE, PE X 2 (VTE) on chronic Coumadin, ambulatory dysfunction secondary to diabetic neuropathy- uses cane, and uses walker if she has to walk longer distances, presents for follow up visit, Rx renewals \par She is overall doing well  Says rash in the lower abdomen is not too bad now that the weather is a little cooler but it's still there. Uses the creams BID\par Had varithena procedure of rt LE last month and will be going for the procedure in the  left LE tomorrow\par She also has georgi't to see plastic surgeon Dec 4th to discuss panniculectomy surgery \par \par  \par Denies SOB, chest pain, abdominal pain, N/V/D,  headache, dizziness \par \par \par \par

## 2022-12-04 NOTE — PHYSICAL EXAM
[Normal Sclera/Conjunctiva] : normal sclera/conjunctiva [Normal Outer Ear/Nose] : the outer ears and nose were normal in appearance [No JVD] : no jugular venous distention [No Lymphadenopathy] : no lymphadenopathy [Normal Rate] : normal rate  [Regular Rhythm] : with a regular rhythm [Normal S1, S2] : normal S1 and S2 [Soft] : abdomen soft [Non Tender] : non-tender [Non-distended] : non-distended [Normal Supraclavicular Nodes] : no supraclavicular lymphadenopathy [No CVA Tenderness] : no CVA  tenderness [No Joint Swelling] : no joint swelling [Speech Grossly Normal] : speech grossly normal [Alert and Oriented x3] : oriented to person, place, and time [Normal] : affect was normal and insight and judgment were intact [de-identified] : + murmur [de-identified] : b/l LE varicosities  [de-identified] : excess loose skin in abdomen, thighs and upper arms, + fungal dermatitis in folds of skin in abdomen  [de-identified] : hx of diabetic neuropathy, ambulating with cane

## 2022-12-05 ENCOUNTER — LABORATORY RESULT (OUTPATIENT)
Age: 52
End: 2022-12-05

## 2022-12-30 ENCOUNTER — LABORATORY RESULT (OUTPATIENT)
Age: 52
End: 2022-12-30

## 2022-12-30 NOTE — DIETITIAN INITIAL EVALUATION ADULT. - NS AS NUTRI INTERV MINERAL
ICU consult note  Was called by ERP, patient 80-year-old gentleman with significant past medical history including Takotsubo's with EF 30%, recent cardiac arrest AICD placed, amputation of right leg.  Has been at SNF for the last 10 days apparently with inability to eat or drink had called previously for placement of PEG tube.  Has been continuing on 20 mg twice daily Lasix.     Patient initially hypotensive in the 70s, after 1 L bolus had improved to 98/54, nor epi was started initially.  However with patient's low EF goal MAP 60-65 is appropriate unless symptomatic.  And patient appears extremely dry with hyponatremia and contraction alkalosis, no significant signs of pulmonary edema on exam or chest x-ray, and no worsening of respiratory status with fluids given.  Patient previously been seen several times by palliative and last CODE STATUS was DNR/DNI, apparently when being talked to at bedside at this time patient is saying full code (further discussion should be had with patient and family on his goals of care likely needs a further palliative care consult to clarify as he is gone from full code to DNR/DNI several times).   Central line was placed in ED, asked to take off the point 1 of nor epi and see how he does as MAP was 75.  MAP after cessation was 70 with SBP in the low low to mid 90s.  Asymptomatic.     Patient also anemic with hemoglobin down to 6.4 guaiac positive but brown stool.  Was close to 7 on last admission possible slow GI bleed.  Anemia likely also contributing to hypotension awaiting blood products.     Patient has been doing well for half hour off of pressors, pressure improved with 500 bolus, awaiting blood products.     With patient's significant history and concern for possible decompensation would admit to [facility], discussed with hospitalist and ERP who agree.     -Goal MAP-60-65, would not start pressors unless patient not responding to small fluid boluses or if he is showing signs of  fluid overload with pulmonary edema and worsening respiratory status with fluids as at this time does not appear to be fully resuscitated.  blood products monitor closely for signs of active bleeding and talk to GI if needed further plan per hospitalist    If patient worsens becomes increasingly hypotensive requiring increasing amounts of pressors or any other concerns please feel free to contact intensivist team for further management   Continue to monitor and replete electrolytes. Upon discharge, recommend liquid, chewable or crushable calcium citrate +Vitamin D

## 2023-01-10 ENCOUNTER — APPOINTMENT (OUTPATIENT)
Dept: BARIATRICS/WEIGHT MGMT | Facility: CLINIC | Age: 53
End: 2023-01-10
Payer: MEDICARE

## 2023-01-10 VITALS
DIASTOLIC BLOOD PRESSURE: 70 MMHG | BODY MASS INDEX: 39.8 KG/M2 | HEIGHT: 64 IN | SYSTOLIC BLOOD PRESSURE: 110 MMHG | OXYGEN SATURATION: 97 % | HEART RATE: 107 BPM | WEIGHT: 233.13 LBS

## 2023-01-10 PROCEDURE — 99213 OFFICE O/P EST LOW 20 MIN: CPT

## 2023-01-17 RX ORDER — BLOOD-GLUCOSE METER
W/DEVICE KIT MISCELLANEOUS
Qty: 1 | Refills: 0 | Status: ACTIVE | COMMUNITY
Start: 2023-01-17 | End: 1900-01-01

## 2023-01-17 RX ORDER — PEN NEEDLE, DIABETIC 31 GX5/16"
NEEDLE, DISPOSABLE MISCELLANEOUS
Qty: 1 | Refills: 1 | Status: ACTIVE | COMMUNITY
Start: 2023-01-17 | End: 1900-01-01

## 2023-01-19 ENCOUNTER — APPOINTMENT (OUTPATIENT)
Dept: INTERNAL MEDICINE | Facility: CLINIC | Age: 53
End: 2023-01-19

## 2023-01-25 NOTE — HISTORY OF PRESENT ILLNESS
[FreeTextEntry1] : 53 yo woman with past history of morbid obesity, dm2, s/p sleeve gastrectomy and 200 lb total weight loss returns for obesity mecicine f/u\par \par tolerating ozempic 2mg\par tolerating phentermine 37.5mg\par weight remains stable in low 230s.  \par weight loss in the 200 lb range\par weight has been 233 lbs +/- 3 lbs for 3 consecutive years\par \par was told by plastic surgery now that her BMI has to be 35 at most before they can do paniculectomy\par \par still consumes a diet that is low luigi, protein sparing but relatively low in fiber\par \par she is using home exercise equipment and moving around as much as possible\par \par

## 2023-01-25 NOTE — ASSESSMENT
[FreeTextEntry1] : BARIATRIC SURGERY HISTORY: gastric sleeve\par \par OBESITY COMORBIDITIES: DM (resolved), HTN (resolved), HLD (resolved)\par \par ANTI-OBESITY MEDICATIONS: bupropion (not currently taking), victoza (no longer taking), phentermine, ozempic\par \par OBESITY MEDICATION SIDE EFFECTS: none\par \par  \par Plan: \par \par Continue current food plan. with Increased fiber, reduce snacking.  \par Use bike, and continue walking as tolerated\par cont phentermine 37.5\par cont ozempic  2mg  \par will try to find other plastic surgery options for paniculectomy and excess skin on arms causing pain and intertrigo\par \par rtc in 4-8 weeks

## 2023-02-06 ENCOUNTER — LABORATORY RESULT (OUTPATIENT)
Age: 53
End: 2023-02-06

## 2023-02-07 ENCOUNTER — APPOINTMENT (OUTPATIENT)
Dept: BARIATRICS/WEIGHT MGMT | Facility: CLINIC | Age: 53
End: 2023-02-07
Payer: MEDICARE

## 2023-02-07 PROCEDURE — 99442: CPT | Mod: 95

## 2023-02-07 NOTE — REASON FOR VISIT
[Home] : at home, [unfilled] , at the time of the visit. [Medical Office: ([facility])___] : at the medical office located in  [Verbal consent obtained from patient] : the patient, [unfilled] [Follow-Up Visit] : a follow-up visit for [Obesity] : obesity

## 2023-02-07 NOTE — HISTORY OF PRESENT ILLNESS
[FreeTextEntry1] : 53 yo woman with past history of morbid obesity, dm2, s/p sleeve gastrectomy and 200 lb total weight loss returns for obesity mecicine f/u\par \par tolerating ozempic 2mg\par tolerating phentermine 37.5mg\par weight remains stable in low 230s.  \par weight loss in the 200 lb range\par \par \par still looking for alternative option for paniculectomy\par \par still consumes a diet that is low luigi, protein sparing but relatively low in fiber\par \par she is using home exercise equipment and moving around as much as possible\par \par otherwise without new complaints today

## 2023-02-07 NOTE — ASSESSMENT
[FreeTextEntry1] : BARIATRIC SURGERY HISTORY: gastric sleeve\par \par OBESITY COMORBIDITIES: DM (resolved), HTN (resolved), HLD (resolved)\par \par ANTI-OBESITY MEDICATIONS: bupropion (not currently taking), victoza (no longer taking), phentermine, ozempic\par \par OBESITY MEDICATION SIDE EFFECTS: none\par \par  \par Plan: \par \par Continue current food plan. with Increased fiber, reduce snacking.  \par Use bike, and continue walking as tolerated\par cont phentermine 37.5\par cont ozempic  2mg  \par will try to find other plastic surgery options for paniculectomy and excess skin on arms causing pain and intertrigo\par \par rtc in 1 month

## 2023-02-28 ENCOUNTER — RESULT CHARGE (OUTPATIENT)
Age: 53
End: 2023-02-28

## 2023-02-28 ENCOUNTER — APPOINTMENT (OUTPATIENT)
Dept: INTERNAL MEDICINE | Facility: CLINIC | Age: 53
End: 2023-02-28
Payer: MEDICARE

## 2023-02-28 VITALS
BODY MASS INDEX: 40.97 KG/M2 | WEIGHT: 240 LBS | HEIGHT: 64 IN | RESPIRATION RATE: 14 BRPM | HEART RATE: 92 BPM | SYSTOLIC BLOOD PRESSURE: 145 MMHG | TEMPERATURE: 97.8 F | DIASTOLIC BLOOD PRESSURE: 79 MMHG | OXYGEN SATURATION: 95 %

## 2023-02-28 VITALS — DIASTOLIC BLOOD PRESSURE: 72 MMHG | SYSTOLIC BLOOD PRESSURE: 110 MMHG

## 2023-02-28 DIAGNOSIS — I10 ESSENTIAL (PRIMARY) HYPERTENSION: ICD-10-CM

## 2023-02-28 DIAGNOSIS — E03.9 HYPOTHYROIDISM, UNSPECIFIED: ICD-10-CM

## 2023-02-28 DIAGNOSIS — E78.5 HYPERLIPIDEMIA, UNSPECIFIED: ICD-10-CM

## 2023-02-28 PROCEDURE — 99214 OFFICE O/P EST MOD 30 MIN: CPT

## 2023-02-28 RX ORDER — WARFARIN 2.5 MG/1
2.5 TABLET ORAL DAILY
Qty: 30 | Refills: 0 | Status: ACTIVE | COMMUNITY
Start: 2023-02-28 | End: 1900-01-01

## 2023-02-28 NOTE — HISTORY OF PRESENT ILLNESS
[de-identified] : Ms. CONY HOUSTON is a 51 year old female, with PMHx of morbid obesity,S/P gastric sleeve surgery Nov 2017-has lost about 190 lbs), T2DM (diet controlled since having bariatric surgery) hypothyroidism, chronic peripheral neuropathy, severe YVONNE, PE X 2 (VTE) on chronic Coumadin, ambulatory dysfunction secondary to diabetic neuropathy -uses cane, walker presents for follow-up \par \par She feels well\par She does endorse chronic numbness/tingling in her feet from the diabetic neuropathy\par She is doing well with her diet \par Denies any CP, SOB, HA, N/V or abdominal pain \par

## 2023-02-28 NOTE — PHYSICAL EXAM
[No Acute Distress] : no acute distress [Normal Sclera/Conjunctiva] : normal sclera/conjunctiva [EOMI] : extraocular movements intact [Normal Outer Ear/Nose] : the outer ears and nose were normal in appearance [Normal Oropharynx] : the oropharynx was normal [No JVD] : no jugular venous distention [No Lymphadenopathy] : no lymphadenopathy [Supple] : supple [Thyroid Normal, No Nodules] : the thyroid was normal and there were no nodules present [No Respiratory Distress] : no respiratory distress  [No Accessory Muscle Use] : no accessory muscle use [Clear to Auscultation] : lungs were clear to auscultation bilaterally [Normal Rate] : normal rate  [Regular Rhythm] : with a regular rhythm [Normal S1, S2] : normal S1 and S2 [No Carotid Bruits] : no carotid bruits [Pedal Pulses Present] : the pedal pulses are present [No Edema] : there was no peripheral edema [No Extremity Clubbing/Cyanosis] : no extremity clubbing/cyanosis [Soft] : abdomen soft [Non Tender] : non-tender [Non-distended] : non-distended [No Masses] : no abdominal mass palpated [No HSM] : no HSM [Normal Bowel Sounds] : normal bowel sounds [No CVA Tenderness] : no CVA  tenderness [No Spinal Tenderness] : no spinal tenderness [No Joint Swelling] : no joint swelling [Grossly Normal Strength/Tone] : grossly normal strength/tone [No Rash] : no rash [Coordination Grossly Intact] : coordination grossly intact [No Focal Deficits] : no focal deficits [Normal Affect] : the affect was normal [Normal Insight/Judgement] : insight and judgment were intact [de-identified] : + Murmur  [de-identified] : + varicose and Spider veins  [de-identified] : ambulates with cane, Chronic neuropathy b/l feet

## 2023-02-28 NOTE — ASSESSMENT
[FreeTextEntry1] : Follow-up \par \par \par Obesity -S/P gastric sleeve surgery Nov 2017\par cont current supplements \par \par Diabetes- Diet control after bariatric surgery \par \par Chronic peripheral neuropathy, severe YVONNE, ambulatory dysfunction secondary to diabetic neuropathy\par cont Lyrica 75 mg TID - refilled today\par cont Percocet 5- 325 mg Every 6 hours as needed - refilled today\par \par Hypothyroidism\par cont Synthroid 137 mcg daily\par \par HTN/HLD\par Low sodium, low cholesterol diet/ increased physical activity \par cont Enalapril 2.5 mg daily\par cont Atorvastatin 40 mg daily\par \par Hx of PE X 2 (VTE)\par on chronic Coumadin\par POCT INR today- 1.7 \par on Coumadin 10mg Tuesday and Wed\par the other days 7.5mg \par Take coumadin 12.5 mg today - resume regular dose tomorrow\par \par we'll check INR with blood work today\par \par follow up in one week for lab results\par

## 2023-03-06 ENCOUNTER — LABORATORY RESULT (OUTPATIENT)
Age: 53
End: 2023-03-06

## 2023-03-06 LAB
INR PPP: 1.7 RATIO
POCT-PROTHROMBIN TIME: 20.2 SECS

## 2023-03-07 ENCOUNTER — APPOINTMENT (OUTPATIENT)
Dept: BARIATRICS/WEIGHT MGMT | Facility: CLINIC | Age: 53
End: 2023-03-07
Payer: MEDICARE

## 2023-03-07 PROCEDURE — 99213 OFFICE O/P EST LOW 20 MIN: CPT

## 2023-03-07 NOTE — HISTORY OF PRESENT ILLNESS
[FreeTextEntry1] : 51 yo woman with past history of morbid obesity, dm2, s/p sleeve gastrectomy and 200 lb total weight loss returns for obesity medicine f/u\par \par tolerating ozempic 2mg\par tolerating phentermine 37.5mg\par weight up 2 lbs to 234 lbs\par weight loss in the 200 lb range\par \par \par reports lifestyle generally unchanged\par having more issues with pain on dorsum of feet though has been better the past few days\par \par otherwise without new complaints today\par \par \par

## 2023-03-07 NOTE — ASSESSMENT
[FreeTextEntry1] : BARIATRIC SURGERY HISTORY: gastric sleeve\par \par OBESITY COMORBIDITIES: DM (resolved), HTN (resolved), HLD (resolved)\par \par ANTI-OBESITY MEDICATIONS: bupropion (not currently taking), victoza (no longer taking), phentermine, ozempic\par \par OBESITY MEDICATION SIDE EFFECTS: none\par \par  \par Plan: \par \par Continue current food plan. with Increased fiber, reduce snacking.  \par Use bike, and continue walking as tolerated\par cont phentermine 37.5\par switch from ozempic to tirzepatide 5mg with monthly uptitration as tolerated \par \par \par rtc in 1 month

## 2023-03-07 NOTE — REASON FOR VISIT
[Home] : at home, [unfilled] , at the time of the visit. [Medical Office: ([facility] [place])___] : at the medical office located in  [Verbal consent obtained from patient] : the patient, [unfilled] [Follow-Up Visit] : a follow-up visit for [Obesity] : obesity

## 2023-03-14 ENCOUNTER — APPOINTMENT (OUTPATIENT)
Dept: INTERNAL MEDICINE | Facility: CLINIC | Age: 53
End: 2023-03-14
Payer: MEDICARE

## 2023-03-14 PROCEDURE — 99213 OFFICE O/P EST LOW 20 MIN: CPT | Mod: 95

## 2023-03-19 NOTE — HISTORY OF PRESENT ILLNESS
[Home] : at home, [unfilled] , at the time of the visit. [Medical Office: ([facility])___] : at the medical office located in  [Verbal consent obtained from patient] : the patient, [unfilled] [de-identified] : Ms. CONY HOUSTON is a 52 year old female, with history  of morbid obesity,S/P gastric sleeve surgery Nov 2017 -has lost about 190 lbs),  T2DM, hypothyroidism, chronic peripheral neuropathy, severe YVONNE, PE X 2 (VTE) on chronic Coumadin, ambulatory dysfunction secondary to diabetic neuropathy- uses cane, and uses walker if she has to walk longer distances, seen in telemedicine for follow up visit and to discuss mammogram results \par \par \par \par  \par \par \par \par \par

## 2023-03-19 NOTE — REVIEW OF SYSTEMS
[Negative] : Integumentary [de-identified] : chronic neuropathy b/l feet [FreeTextEntry9] : ambulatory dysfunction

## 2023-03-19 NOTE — ASSESSMENT
[FreeTextEntry1] : \par mammogram results reviewed and discussed with pt\par \par needs to have breast biopsy done-- referral faxed to \par \par fax # [phone]

## 2023-03-31 ENCOUNTER — NON-APPOINTMENT (OUTPATIENT)
Age: 53
End: 2023-03-31

## 2023-03-31 DIAGNOSIS — R92.8 OTHER ABNORMAL AND INCONCLUSIVE FINDINGS ON DIAGNOSTIC IMAGING OF BREAST: ICD-10-CM

## 2023-04-03 ENCOUNTER — LABORATORY RESULT (OUTPATIENT)
Age: 53
End: 2023-04-03

## 2023-04-03 ENCOUNTER — APPOINTMENT (OUTPATIENT)
Dept: INTERNAL MEDICINE | Facility: CLINIC | Age: 53
End: 2023-04-03
Payer: MEDICARE

## 2023-04-03 DIAGNOSIS — R89.7 ABNORMAL HISTOLOGICAL FINDINGS IN SPECIMENS FROM OTHER ORGANS, SYSTEMS AND TISSUES: ICD-10-CM

## 2023-04-03 PROCEDURE — 99213 OFFICE O/P EST LOW 20 MIN: CPT | Mod: 95

## 2023-04-03 NOTE — REVIEW OF SYSTEMS
[Negative] : Heme/Lymph [FreeTextEntry9] : ambulatory dysfunction [de-identified] : chronic neuropathy b/l feet

## 2023-04-03 NOTE — HISTORY OF PRESENT ILLNESS
[Home] : at home, [unfilled] , at the time of the visit. [Medical Office: ([facility])___] : at the medical office located in  [Verbal consent obtained from patient] : the patient, [unfilled] [de-identified] : \par Ms. CONY HOUSTON is a 52 year old female, with history  of morbid obesity,S/P gastric sleeve surgery Nov 2017 -has lost about 190 lbs),  T2DM, hypothyroidism, chronic peripheral neuropathy, severe YVONNE, PE X 2 (VTE) on chronic Coumadin, ambulatory dysfunction secondary to diabetic neuropathy- uses cane, and uses walker if she has to walk longer distances, seen in telemedicine for follow up visit and to discuss mammogram results \par \par \par \par  \par \par \par \par \par

## 2023-04-03 NOTE — ASSESSMENT
[FreeTextEntry1] : \par Abnormal left breast biopsy\par results discussed with pt\par referred to breast surgeon

## 2023-04-12 ENCOUNTER — APPOINTMENT (OUTPATIENT)
Dept: BARIATRICS/WEIGHT MGMT | Facility: CLINIC | Age: 53
End: 2023-04-12
Payer: MEDICARE

## 2023-04-12 PROCEDURE — 99213 OFFICE O/P EST LOW 20 MIN: CPT | Mod: 95

## 2023-04-12 NOTE — ASSESSMENT
[FreeTextEntry1] : BARIATRIC SURGERY HISTORY: gastric sleeve\par \par OBESITY COMORBIDITIES: DM (resolved), HTN (resolved), HLD (resolved)\par \par ANTI-OBESITY MEDICATIONS: bupropion (not currently taking), victoza (no longer taking), phentermine, ozempic\par \par OBESITY MEDICATION SIDE EFFECTS: none\par \par  \par Plan: \par \par Continue current food plan. with Increased fiber, reduce snacking.  \par Use bike, and continue walking as tolerated\par cont phentermine 37.5\par continue semaglutide 2mg unless can switch to tirzepatide\par \par \par rtc in 1 month

## 2023-04-12 NOTE — REASON FOR VISIT
[Medical Office: ([facility])___] : at the medical office located in  [Verbal consent obtained from patient] : the patient, [unfilled] [Follow-Up Visit] : a follow-up visit for [Obesity] : obesity [Home] : at home, [unfilled] , at the time of the visit. [Other Location: e.g. Home (Enter Location, City,State)___] : at [unfilled] [Patient] : the patient

## 2023-04-12 NOTE — HISTORY OF PRESENT ILLNESS
[FreeTextEntry1] : 53 yo woman with past history of morbid obesity, dm2, s/p sleeve gastrectomy and 200 lb total weight loss returns for obesity medicine f/u\par \par tolerating ozempic 2mg\par tolerating phentermine 37.5mg\par weight reamins at 234 lbs\par weight loss in the 200 lb range\par \par \par reports lifestyle generally unchanged\par to date has not been able to get approval for mounjaro\par \par otherwise without new complaints today\par \par \par

## 2023-04-19 ENCOUNTER — LABORATORY RESULT (OUTPATIENT)
Age: 53
End: 2023-04-19

## 2023-04-21 ENCOUNTER — NON-APPOINTMENT (OUTPATIENT)
Age: 53
End: 2023-04-21

## 2023-04-26 ENCOUNTER — LABORATORY RESULT (OUTPATIENT)
Age: 53
End: 2023-04-26

## 2023-04-27 ENCOUNTER — NON-APPOINTMENT (OUTPATIENT)
Age: 53
End: 2023-04-27

## 2023-04-27 ENCOUNTER — APPOINTMENT (OUTPATIENT)
Dept: INTERNAL MEDICINE | Facility: CLINIC | Age: 53
End: 2023-04-27
Payer: MEDICARE

## 2023-04-27 ENCOUNTER — APPOINTMENT (OUTPATIENT)
Dept: CARDIOLOGY | Facility: CLINIC | Age: 53
End: 2023-04-27
Payer: MEDICARE

## 2023-04-27 VITALS
RESPIRATION RATE: 12 BRPM | WEIGHT: 230 LBS | HEIGHT: 64 IN | DIASTOLIC BLOOD PRESSURE: 75 MMHG | OXYGEN SATURATION: 98 % | HEART RATE: 87 BPM | BODY MASS INDEX: 39.27 KG/M2 | SYSTOLIC BLOOD PRESSURE: 124 MMHG

## 2023-04-27 DIAGNOSIS — I50.9 HEART FAILURE, UNSPECIFIED: ICD-10-CM

## 2023-04-27 DIAGNOSIS — D64.9 ANEMIA, UNSPECIFIED: ICD-10-CM

## 2023-04-27 PROCEDURE — 99214 OFFICE O/P EST MOD 30 MIN: CPT

## 2023-04-27 PROCEDURE — 99203 OFFICE O/P NEW LOW 30 MIN: CPT

## 2023-04-27 PROCEDURE — 93000 ELECTROCARDIOGRAM COMPLETE: CPT

## 2023-04-27 NOTE — DISCUSSION/SUMMARY
[Procedure Low Risk] : the procedure risk is low [FreeTextEntry1] : The patient is a 52-year-old female hypothyroid, HTN, HLD,  PE, HFpEF, s/p gastric sleeve with breast mass\par #1 HFpEF- normal EF\par #2 HTN- c/w enalapril\par #3 HLD- c/w atorvastatin\par #4 PE- recurrent broke through Xarelto, warfarin dosing, INR 2.4 today\par #5 Hypothyroid- on levothyroxine\par #6 Surgery- There are no cardiac contraindications to breast surgery off warfarin 5 days prior.

## 2023-04-27 NOTE — HISTORY OF PRESENT ILLNESS
[Preoperative Visit] : for a medical evaluation prior to surgery [Scheduled Procedure ___] : a [unfilled] [Date of Surgery ___] : on [unfilled] [Surgeon Name ___] : surgeon: [unfilled] [FreeTextEntry1] : Kesha has neuropathy of her feet. Pain is less. She had abnormal mammo and needs surgery. Not malignant. No CP,palpitations or SOB. Ambulates with cane.

## 2023-04-27 NOTE — PHYSICAL EXAM
[General Appearance - Well Developed] : well developed [Normal Appearance] : normal appearance [Well Groomed] : well groomed [General Appearance - Well Nourished] : well nourished [No Deformities] : no deformities [General Appearance - In No Acute Distress] : no acute distress [Normal Conjunctiva] : the conjunctiva exhibited no abnormalities [Eyelids - No Xanthelasma] : the eyelids demonstrated no xanthelasmas [Normal Oral Mucosa] : normal oral mucosa [No Oral Pallor] : no oral pallor [No Oral Cyanosis] : no oral cyanosis [Normal Jugular Venous A Waves Present] : normal jugular venous A waves present [Normal Jugular Venous V Waves Present] : normal jugular venous V waves present [No Jugular Venous Carreno A Waves] : no jugular venous carreno A waves [Respiration, Rhythm And Depth] : normal respiratory rhythm and effort [Exaggerated Use Of Accessory Muscles For Inspiration] : no accessory muscle use [Auscultation Breath Sounds / Voice Sounds] : lungs were clear to auscultation bilaterally [Heart Sounds] : normal S1 and S2 [Murmurs] : no murmurs present [Tachycardic ___] : the heart rate was tachycardic at [unfilled] bpm [Abdomen Soft] : soft [Abdomen Tenderness] : non-tender [Abdomen Mass (___ Cm)] : no abdominal mass palpated [Abnormal Walk] : normal gait [Gait - Sufficient For Exercise Testing] : the gait was sufficient for exercise testing [Nail Clubbing] : no clubbing of the fingernails [Cyanosis, Localized] : no localized cyanosis [Petechial Hemorrhages (___cm)] : no petechial hemorrhages [Skin Color & Pigmentation] : normal skin color and pigmentation [] : no rash [No Venous Stasis] : no venous stasis [Skin Lesions] : no skin lesions [No Skin Ulcers] : no skin ulcer [No Xanthoma] : no  xanthoma was observed [Oriented To Time, Place, And Person] : oriented to person, place, and time [Affect] : the affect was normal [Mood] : the mood was normal [No Anxiety] : not feeling anxious

## 2023-05-01 ENCOUNTER — LABORATORY RESULT (OUTPATIENT)
Age: 53
End: 2023-05-01

## 2023-05-02 ENCOUNTER — LABORATORY RESULT (OUTPATIENT)
Age: 53
End: 2023-05-02

## 2023-05-04 NOTE — HISTORY OF PRESENT ILLNESS
[FreeTextEntry1] : left breast surgery [FreeTextEntry2] : 5/8/2023 [FreeTextEntry3] : Dr Agata Arias [FreeTextEntry4] : \par Ms. CONY HOUSTON is a 51 year old female, with hx of morbid obesity, s/p gastric sleeve Sx ( Nov 2017) ( has los about 190 lbs, DM II, not on any meds-, hypothyroid, chronic peripheral neuropathy, severe OA, PE X 2 ( VTE) on chronic coumadin, ambulatory dysfunction, secondary to diabetic neuropathy, presents for preoperative medical clearance\par She is doing well. \par Denies SOB, chest pain, abdominal pain, N/V/D, leg swelling, headache, dizziness \par Offers no complaints\par \par \par

## 2023-05-04 NOTE — ASSESSMENT
[Patient Optimized for Surgery] : Patient optimized for surgery [Modify anticoagulant treatment prior to procedure] : Modify anticoagulant treatment prior to procedure [FreeTextEntry4] : \par cardiology clearance done- see cardio notes 4/27/23 [FreeTextEntry5] : pt instructed to stop coumadin 5 days prior to surgery

## 2023-05-04 NOTE — REVIEW OF SYSTEMS
[Negative] : Heme/Lymph [FreeTextEntry9] : ambulatory dysfunction [de-identified] : chronic neuropathy b/l feet

## 2023-05-04 NOTE — PHYSICAL EXAM
[No Acute Distress] : no acute distress [Normal Sclera/Conjunctiva] : normal sclera/conjunctiva [EOMI] : extraocular movements intact [Normal Outer Ear/Nose] : the outer ears and nose were normal in appearance [Normal Oropharynx] : the oropharynx was normal [No JVD] : no jugular venous distention [No Lymphadenopathy] : no lymphadenopathy [Supple] : supple [Thyroid Normal, No Nodules] : the thyroid was normal and there were no nodules present [No Respiratory Distress] : no respiratory distress  [No Accessory Muscle Use] : no accessory muscle use [Clear to Auscultation] : lungs were clear to auscultation bilaterally [Normal Rate] : normal rate  [Regular Rhythm] : with a regular rhythm [Normal S1, S2] : normal S1 and S2 [No Carotid Bruits] : no carotid bruits [Pedal Pulses Present] : the pedal pulses are present [No Edema] : there was no peripheral edema [No Extremity Clubbing/Cyanosis] : no extremity clubbing/cyanosis [Soft] : abdomen soft [Non Tender] : non-tender [Non-distended] : non-distended [No Masses] : no abdominal mass palpated [No HSM] : no HSM [Normal Bowel Sounds] : normal bowel sounds [No CVA Tenderness] : no CVA  tenderness [No Spinal Tenderness] : no spinal tenderness [No Joint Swelling] : no joint swelling [Grossly Normal Strength/Tone] : grossly normal strength/tone [No Rash] : no rash [Coordination Grossly Intact] : coordination grossly intact [No Focal Deficits] : no focal deficits [Normal Affect] : the affect was normal [Normal Insight/Judgement] : insight and judgment were intact [de-identified] : + Murmur  [de-identified] : + varicose and Spider veins  [de-identified] : ambulates with cane, Chronic neuropathy b/l feet

## 2023-05-24 ENCOUNTER — APPOINTMENT (OUTPATIENT)
Dept: BARIATRICS/WEIGHT MGMT | Facility: CLINIC | Age: 53
End: 2023-05-24
Payer: MEDICARE

## 2023-05-24 PROCEDURE — 99213 OFFICE O/P EST LOW 20 MIN: CPT | Mod: 95

## 2023-05-24 RX ORDER — TIRZEPATIDE 5 MG/.5ML
5 INJECTION, SOLUTION SUBCUTANEOUS
Qty: 4 | Refills: 5 | Status: DISCONTINUED | COMMUNITY
Start: 2023-03-07 | End: 2023-05-24

## 2023-05-25 NOTE — REASON FOR VISIT
[Home] : at home, [unfilled] , at the time of the visit. [Other Location: e.g. Home (Enter Location, City,State)___] : at [unfilled] [Patient] : the patient [Follow-Up Visit] : a follow-up visit for [Obesity] : obesity

## 2023-05-25 NOTE — HISTORY OF PRESENT ILLNESS
[FreeTextEntry1] : 53 yo woman with past history of morbid obesity, dm2, s/p sleeve gastrectomy and 200 lb total weight loss returns for obesity medicine f/u\par \par tolerating ozempic 2mg\par tolerating phentermine 37.5mg\par weight reamins at 234 lbs give or take a lb or two\par weight loss in the 200 lb range\par \par \par reports lifestyle generally unchanged\par appetite under reasonable control\par continues with home mini bike\par on phentermine and ozempic\par \par otherwise without new complaints today\par \par \par

## 2023-06-05 ENCOUNTER — LABORATORY RESULT (OUTPATIENT)
Age: 53
End: 2023-06-05

## 2023-06-26 ENCOUNTER — APPOINTMENT (OUTPATIENT)
Dept: INTERNAL MEDICINE | Facility: CLINIC | Age: 53
End: 2023-06-26
Payer: MEDICARE

## 2023-06-26 VITALS
WEIGHT: 238 LBS | RESPIRATION RATE: 14 BRPM | DIASTOLIC BLOOD PRESSURE: 78 MMHG | OXYGEN SATURATION: 96 % | BODY MASS INDEX: 40.85 KG/M2 | SYSTOLIC BLOOD PRESSURE: 117 MMHG | HEART RATE: 77 BPM

## 2023-06-26 DIAGNOSIS — L85.3 XEROSIS CUTIS: ICD-10-CM

## 2023-06-26 PROCEDURE — 99214 OFFICE O/P EST MOD 30 MIN: CPT

## 2023-06-29 ENCOUNTER — LABORATORY RESULT (OUTPATIENT)
Age: 53
End: 2023-06-29

## 2023-07-01 NOTE — PHYSICAL EXAM
[No Acute Distress] : no acute distress [Normal Sclera/Conjunctiva] : normal sclera/conjunctiva [EOMI] : extraocular movements intact [Normal Outer Ear/Nose] : the outer ears and nose were normal in appearance [Normal Oropharynx] : the oropharynx was normal [No JVD] : no jugular venous distention [No Lymphadenopathy] : no lymphadenopathy [Supple] : supple [Thyroid Normal, No Nodules] : the thyroid was normal and there were no nodules present [No Respiratory Distress] : no respiratory distress  [No Accessory Muscle Use] : no accessory muscle use [Clear to Auscultation] : lungs were clear to auscultation bilaterally [Normal Rate] : normal rate  [Regular Rhythm] : with a regular rhythm [Normal S1, S2] : normal S1 and S2 [No Carotid Bruits] : no carotid bruits [Pedal Pulses Present] : the pedal pulses are present [No Edema] : there was no peripheral edema [No Extremity Clubbing/Cyanosis] : no extremity clubbing/cyanosis [Soft] : abdomen soft [Non Tender] : non-tender [Non-distended] : non-distended [No Masses] : no abdominal mass palpated [No HSM] : no HSM [Normal Bowel Sounds] : normal bowel sounds [No CVA Tenderness] : no CVA  tenderness [No Spinal Tenderness] : no spinal tenderness [No Joint Swelling] : no joint swelling [Grossly Normal Strength/Tone] : grossly normal strength/tone [No Rash] : no rash [Coordination Grossly Intact] : coordination grossly intact [No Focal Deficits] : no focal deficits [Normal Affect] : the affect was normal [Normal Insight/Judgement] : insight and judgment were intact [de-identified] : + Murmur  [de-identified] : + varicose and Spider veins  [de-identified] : dry scally like skin on b/l  toes- ? callus  [de-identified] : ambulates with cane, Chronic neuropathy b/l feet

## 2023-07-01 NOTE — HISTORY OF PRESENT ILLNESS
[de-identified] : Kesha Hidalgo is a 53 yo woman with past history of morbid obesity, DM type 2, s/p sleeve gastrectomy and 200 lb total weight loss returns who presents for a follow up visit and forms to fill out. \par \par Pt. states she is feeling well.\par Has been experiencing a lot of pain on bilateral feet d/t neuropathy as a tingling, burning, and tightness sensation, takes Lyrica, Percocet, and duloxetine. She is unable to feel her feet.\par Denies CP, SOB, N/V/D, abdominal pain, HA, lightheadedness, or dizziness. \par She c/o having dry skin on her b/l toes. has been using moisturizer but areas still get dry

## 2023-07-01 NOTE — REVIEW OF SYSTEMS
[Negative] : Heme/Lymph [FreeTextEntry9] : ambulatory dysfunction [de-identified] : chronic neuropathy b/l feet

## 2023-07-01 NOTE — ASSESSMENT
[FreeTextEntry1] : \par dry skin b/l toes\par mupirocin ointment\par referred to dermatology\par \par Obesity -S/P gastric sleeve surgery Nov 2017\par cont current supplements \par \par Diabetes- Diet control after bariatric surgery \par \par Chronic peripheral neuropathy, severe YVONNE, ambulatory dysfunction secondary to diabetic neuropathy\par cont Lyrica 75 mg TID \par cont Percocet 5- 325 mg Every 6 hours as needed\par \par Hypothyroidism\par cont Synthroid 137 mcg daily\par \par HTN/HLD\par cont Enalapril 2,5 mg daily\par cont Atorvastatin 40 mg daily\par \par Hx of PE X 2 (VTE)\par on Coumadin 5mg Tuesday & Wednesday and Coumadin 7.5mg the other days\par INR today - 2.4 \par \par follow up in one week for lab results\par

## 2023-08-08 ENCOUNTER — LABORATORY RESULT (OUTPATIENT)
Age: 53
End: 2023-08-08

## 2023-08-17 ENCOUNTER — LABORATORY RESULT (OUTPATIENT)
Age: 53
End: 2023-08-17

## 2023-08-18 ENCOUNTER — LABORATORY RESULT (OUTPATIENT)
Age: 53
End: 2023-08-18

## 2023-08-29 NOTE — DIETITIAN INITIAL EVALUATION ADULT. - 25 CAL
met with Pt at bedside to discuss discharge plan. Pt verbalized plan to discharge home via family transport.  inquired about Pt's input on possibly needing home health services and home abx. Pt verbalized agreement to discharge plan if medically necessary.  inquired about preference for home health company. Pt verbalized he did not have a preference.  to follow-up with provider and Pt for needs.     called [facility] ([facility], [phone]) to inquire about Pt's potential needs. No answer;  left message for callback.       08/29/23 1629   Discharge Reassessment   Assessment Type Discharge Planning Reassessment   Did the patient's condition or plan change since previous assessment? Yes   Discharge Plan discussed with: Patient   Communicated KRISTAN with patient/caregiver Yes   Discharge Plan A Home with family   Discharge Plan B Home Health   DME Needed Upon Discharge  none   Transition of Care Barriers None   Post-Acute Status   Discharge Delays None known at this time        [id number]

## 2023-09-18 ENCOUNTER — RX RENEWAL (OUTPATIENT)
Age: 53
End: 2023-09-18

## 2023-09-21 ENCOUNTER — LABORATORY RESULT (OUTPATIENT)
Age: 53
End: 2023-09-21

## 2023-10-05 RX ORDER — SEMAGLUTIDE 1.34 MG/ML
4 INJECTION, SOLUTION SUBCUTANEOUS
Qty: 9 | Refills: 3 | Status: DISCONTINUED | COMMUNITY
Start: 2022-02-08 | End: 2023-10-05

## 2023-10-05 RX ORDER — SEMAGLUTIDE 1.34 MG/ML
2 INJECTION, SOLUTION SUBCUTANEOUS
Qty: 1.5 | Refills: 0 | Status: DISCONTINUED | COMMUNITY
Start: 2021-12-21 | End: 2023-10-05

## 2023-10-09 ENCOUNTER — LABORATORY RESULT (OUTPATIENT)
Age: 53
End: 2023-10-09

## 2023-10-09 ENCOUNTER — APPOINTMENT (OUTPATIENT)
Dept: INTERNAL MEDICINE | Facility: CLINIC | Age: 53
End: 2023-10-09
Payer: MEDICARE

## 2023-10-09 VITALS
DIASTOLIC BLOOD PRESSURE: 73 MMHG | TEMPERATURE: 98 F | RESPIRATION RATE: 14 BRPM | HEIGHT: 64 IN | WEIGHT: 239 LBS | HEART RATE: 87 BPM | BODY MASS INDEX: 40.8 KG/M2 | OXYGEN SATURATION: 95 % | SYSTOLIC BLOOD PRESSURE: 134 MMHG

## 2023-10-09 DIAGNOSIS — Z87.19 PERSONAL HISTORY OF OTHER DISEASES OF THE DIGESTIVE SYSTEM: ICD-10-CM

## 2023-10-09 DIAGNOSIS — Z00.00 ENCOUNTER FOR GENERAL ADULT MEDICAL EXAMINATION W/OUT ABNORMAL FINDINGS: ICD-10-CM

## 2023-10-09 PROCEDURE — G0444 DEPRESSION SCREEN ANNUAL: CPT | Mod: 59

## 2023-10-09 PROCEDURE — 99396 PREV VISIT EST AGE 40-64: CPT | Mod: GY

## 2023-10-09 RX ORDER — FAMOTIDINE 20 MG/1
20 TABLET, FILM COATED ORAL
Qty: 90 | Refills: 1 | Status: ACTIVE | COMMUNITY
Start: 2023-10-09 | End: 1900-01-01

## 2023-10-09 RX ORDER — MUPIROCIN 20 MG/G
2 OINTMENT TOPICAL 3 TIMES DAILY
Qty: 1 | Refills: 3 | Status: DISCONTINUED | COMMUNITY
Start: 2023-06-26 | End: 2023-10-09

## 2023-10-09 RX ORDER — MOMETASONE FUROATE 1 MG/G
0.1 CREAM TOPICAL TWICE DAILY
Qty: 1 | Refills: 1 | Status: DISCONTINUED | COMMUNITY
Start: 2020-09-01 | End: 2023-10-09

## 2023-10-09 RX ORDER — CLOTRIMAZOLE AND BETAMETHASONE DIPROPIONATE 10; .5 MG/G; MG/G
1-0.05 CREAM TOPICAL TWICE DAILY
Qty: 1 | Refills: 3 | Status: DISCONTINUED | COMMUNITY
Start: 2022-07-28 | End: 2023-10-09

## 2023-10-10 LAB
25(OH)D3 SERPL-MCNC: 33.5 NG/ML
ALBUMIN SERPL ELPH-MCNC: 4.2 G/DL
ALP BLD-CCNC: 73 U/L
ALT SERPL-CCNC: 42 U/L
ANION GAP SERPL CALC-SCNC: 12 MMOL/L
APPEARANCE: CLEAR
APTT BLD: 43.9 SEC
AST SERPL-CCNC: 40 U/L
BASOPHILS # BLD AUTO: 0.03 K/UL
BASOPHILS NFR BLD AUTO: 0.6 %
BILIRUB SERPL-MCNC: 0.3 MG/DL
BILIRUBIN URINE: NEGATIVE
BLOOD URINE: NEGATIVE
BUN SERPL-MCNC: 19 MG/DL
CALCIUM SERPL-MCNC: 9 MG/DL
CHLORIDE SERPL-SCNC: 104 MMOL/L
CHOLEST SERPL-MCNC: 141 MG/DL
CO2 SERPL-SCNC: 24 MMOL/L
COLOR: YELLOW
CREAT SERPL-MCNC: 0.63 MG/DL
EGFR: 106 ML/MIN/1.73M2
EOSINOPHIL # BLD AUTO: 0.13 K/UL
EOSINOPHIL NFR BLD AUTO: 2.5 %
ESTIMATED AVERAGE GLUCOSE: 94 MG/DL
GLUCOSE QUALITATIVE U: NEGATIVE MG/DL
GLUCOSE SERPL-MCNC: 75 MG/DL
HBA1C MFR BLD HPLC: 4.9 %
HCT VFR BLD CALC: 41.6 %
HDLC SERPL-MCNC: 69 MG/DL
HGB BLD-MCNC: 13 G/DL
IMM GRANULOCYTES NFR BLD AUTO: 0.2 %
INR PPP: 2.9 RATIO
KETONES URINE: NEGATIVE MG/DL
LDLC SERPL CALC-MCNC: 61 MG/DL
LEUKOCYTE ESTERASE URINE: ABNORMAL
LYMPHOCYTES # BLD AUTO: 1.41 K/UL
LYMPHOCYTES NFR BLD AUTO: 27.5 %
MAN DIFF?: NORMAL
MCHC RBC-ENTMCNC: 28.1 PG
MCHC RBC-ENTMCNC: 31.3 GM/DL
MCV RBC AUTO: 90 FL
MONOCYTES # BLD AUTO: 0.58 K/UL
MONOCYTES NFR BLD AUTO: 11.3 %
NEUTROPHILS # BLD AUTO: 2.97 K/UL
NEUTROPHILS NFR BLD AUTO: 57.9 %
NITRITE URINE: NEGATIVE
NONHDLC SERPL-MCNC: 72 MG/DL
PH URINE: 6.5
PLATELET # BLD AUTO: 183 K/UL
POTASSIUM SERPL-SCNC: 5.1 MMOL/L
PROT SERPL-MCNC: 7.4 G/DL
PROTEIN URINE: NEGATIVE MG/DL
PT BLD: 31.8 SEC
RBC # BLD: 4.62 M/UL
RBC # FLD: 13.5 %
SODIUM SERPL-SCNC: 140 MMOL/L
SPECIFIC GRAVITY URINE: 1.03
TRIGL SERPL-MCNC: 53 MG/DL
TSH SERPL-ACNC: 0.1 UIU/ML
UROBILINOGEN URINE: 0.2 MG/DL
VIT B12 SERPL-MCNC: 1067 PG/ML
WBC # FLD AUTO: 5.13 K/UL

## 2023-10-19 ENCOUNTER — LABORATORY RESULT (OUTPATIENT)
Age: 53
End: 2023-10-19

## 2023-10-30 RX ORDER — CHLORHEXIDINE GLUCONATE 4 %
1000 LIQUID (ML) TOPICAL
Qty: 90 | Refills: 1 | Status: ACTIVE | COMMUNITY
Start: 2021-06-04 | End: 1900-01-01

## 2023-10-31 ENCOUNTER — LABORATORY RESULT (OUTPATIENT)
Age: 53
End: 2023-10-31

## 2023-11-01 ENCOUNTER — RX RENEWAL (OUTPATIENT)
Age: 53
End: 2023-11-01

## 2023-11-07 ENCOUNTER — RX RENEWAL (OUTPATIENT)
Age: 53
End: 2023-11-07

## 2023-11-07 ENCOUNTER — LABORATORY RESULT (OUTPATIENT)
Age: 53
End: 2023-11-07

## 2023-11-16 ENCOUNTER — LABORATORY RESULT (OUTPATIENT)
Age: 53
End: 2023-11-16

## 2023-11-22 ENCOUNTER — RX RENEWAL (OUTPATIENT)
Age: 53
End: 2023-11-22

## 2023-12-07 ENCOUNTER — APPOINTMENT (OUTPATIENT)
Dept: SURGERY | Facility: CLINIC | Age: 53
End: 2023-12-07
Payer: MEDICARE

## 2023-12-07 VITALS
SYSTOLIC BLOOD PRESSURE: 135 MMHG | OXYGEN SATURATION: 97 % | RESPIRATION RATE: 17 BRPM | HEIGHT: 64 IN | HEART RATE: 87 BPM | DIASTOLIC BLOOD PRESSURE: 87 MMHG | BODY MASS INDEX: 40 KG/M2 | WEIGHT: 234.31 LBS | TEMPERATURE: 97.8 F

## 2023-12-07 DIAGNOSIS — Z98.84 BARIATRIC SURGERY STATUS: ICD-10-CM

## 2023-12-07 DIAGNOSIS — R63.5 ABNORMAL WEIGHT GAIN: ICD-10-CM

## 2023-12-07 PROCEDURE — 99213 OFFICE O/P EST LOW 20 MIN: CPT

## 2023-12-12 RX ORDER — MECLIZINE HYDROCHLORIDE 25 MG/1
25 TABLET ORAL 3 TIMES DAILY
Qty: 90 | Refills: 0 | Status: ACTIVE | COMMUNITY
Start: 2022-06-07 | End: 1900-01-01

## 2023-12-21 ENCOUNTER — LABORATORY RESULT (OUTPATIENT)
Age: 53
End: 2023-12-21

## 2024-01-03 ENCOUNTER — APPOINTMENT (OUTPATIENT)
Dept: INTERNAL MEDICINE | Facility: CLINIC | Age: 54
End: 2024-01-03
Payer: MEDICARE

## 2024-01-03 VITALS
HEIGHT: 64 IN | WEIGHT: 242 LBS | BODY MASS INDEX: 41.32 KG/M2 | HEART RATE: 76 BPM | SYSTOLIC BLOOD PRESSURE: 110 MMHG | RESPIRATION RATE: 12 BRPM | OXYGEN SATURATION: 97 % | DIASTOLIC BLOOD PRESSURE: 80 MMHG

## 2024-01-03 DIAGNOSIS — Z01.818 ENCOUNTER FOR OTHER PREPROCEDURAL EXAMINATION: ICD-10-CM

## 2024-01-03 PROCEDURE — 99214 OFFICE O/P EST MOD 30 MIN: CPT

## 2024-01-03 PROCEDURE — 93000 ELECTROCARDIOGRAM COMPLETE: CPT

## 2024-01-08 LAB
25(OH)D3 SERPL-MCNC: 34.2 NG/ML
BASOPHILS # BLD AUTO: 0.03 K/UL
BASOPHILS NFR BLD AUTO: 0.6 %
EOSINOPHIL # BLD AUTO: 0.11 K/UL
EOSINOPHIL NFR BLD AUTO: 2.2 %
ESTIMATED AVERAGE GLUCOSE: 100 MG/DL
FOLATE SERPL-MCNC: >20 NG/ML
HBA1C MFR BLD HPLC: 5.1 %
HCT VFR BLD CALC: 42 %
HGB BLD-MCNC: 13.3 G/DL
IMM GRANULOCYTES NFR BLD AUTO: 0 %
IRON SATN MFR SERPL: 23 %
IRON SERPL-MCNC: 67 UG/DL
LYMPHOCYTES # BLD AUTO: 1.61 K/UL
LYMPHOCYTES NFR BLD AUTO: 32.5 %
MAN DIFF?: NORMAL
MCHC RBC-ENTMCNC: 27.8 PG
MCHC RBC-ENTMCNC: 31.7 GM/DL
MCV RBC AUTO: 87.9 FL
MONOCYTES # BLD AUTO: 0.51 K/UL
MONOCYTES NFR BLD AUTO: 10.3 %
NEUTROPHILS # BLD AUTO: 2.69 K/UL
NEUTROPHILS NFR BLD AUTO: 54.4 %
PLATELET # BLD AUTO: 178 K/UL
RBC # BLD: 4.78 M/UL
RBC # FLD: 15.1 %
TIBC SERPL-MCNC: 292 UG/DL
UIBC SERPL-MCNC: 225 UG/DL
VIT B12 SERPL-MCNC: 921 PG/ML
VIT B6 SERPL-MCNC: 16 UG/L
WBC # FLD AUTO: 4.95 K/UL

## 2024-01-10 LAB — VIT B1 SERPL-MCNC: 123.9 NMOL/L

## 2024-01-18 ENCOUNTER — LABORATORY RESULT (OUTPATIENT)
Age: 54
End: 2024-01-18

## 2024-01-18 LAB
ALBUMIN SERPL ELPH-MCNC: 4.2 G/DL
ALP BLD-CCNC: 82 U/L
ALT SERPL-CCNC: 51 U/L
ANION GAP SERPL CALC-SCNC: 9 MMOL/L
AST SERPL-CCNC: 40 U/L
BILIRUB SERPL-MCNC: 0.3 MG/DL
BUN SERPL-MCNC: 14 MG/DL
CALCIUM SERPL-MCNC: 9.3 MG/DL
CHLORIDE SERPL-SCNC: 102 MMOL/L
CO2 SERPL-SCNC: 29 MMOL/L
CREAT SERPL-MCNC: 0.55 MG/DL
EGFR: 110 ML/MIN/1.73M2
GLUCOSE SERPL-MCNC: 83 MG/DL
INR PPP: 1.89 RATIO
POTASSIUM SERPL-SCNC: 4.2 MMOL/L
PROT SERPL-MCNC: 7.9 G/DL
PT BLD: 21.1 SEC
SODIUM SERPL-SCNC: 140 MMOL/L

## 2024-01-18 RX ORDER — WARFARIN 5 MG/1
5 TABLET ORAL
Qty: 100 | Refills: 0 | Status: ACTIVE | COMMUNITY
Start: 2019-03-11 | End: 1900-01-01

## 2024-01-18 RX ORDER — WARFARIN 7.5 MG/1
7.5 TABLET ORAL
Qty: 90 | Refills: 0 | Status: ACTIVE | COMMUNITY
Start: 2017-11-22 | End: 1900-01-01

## 2024-01-18 RX ORDER — CHLORHEXIDINE GLUCONATE 4 %
325 (65 FE) LIQUID (ML) TOPICAL
Qty: 270 | Refills: 1 | Status: ACTIVE | COMMUNITY
Start: 2017-08-31 | End: 1900-01-01

## 2024-01-24 ENCOUNTER — OUTPATIENT (OUTPATIENT)
Dept: OUTPATIENT SERVICES | Facility: HOSPITAL | Age: 54
LOS: 1 days | End: 2024-01-24
Payer: MEDICARE

## 2024-01-24 ENCOUNTER — APPOINTMENT (OUTPATIENT)
Dept: BARIATRICS/WEIGHT MGMT | Facility: CLINIC | Age: 54
End: 2024-01-24
Payer: MEDICARE

## 2024-01-24 DIAGNOSIS — E66.9 OBESITY, UNSPECIFIED: ICD-10-CM

## 2024-01-24 DIAGNOSIS — E11.69 TYPE 2 DIABETES MELLITUS WITH OTHER SPECIFIED COMPLICATION: ICD-10-CM

## 2024-01-24 DIAGNOSIS — Z98.890 OTHER SPECIFIED POSTPROCEDURAL STATES: Chronic | ICD-10-CM

## 2024-01-24 DIAGNOSIS — Z90.721 ACQUIRED ABSENCE OF OVARIES, UNILATERAL: Chronic | ICD-10-CM

## 2024-01-24 DIAGNOSIS — E66.9 TYPE 2 DIABETES MELLITUS WITH OTHER SPECIFIED COMPLICATION: ICD-10-CM

## 2024-01-24 PROCEDURE — G0463: CPT

## 2024-01-24 PROCEDURE — 99213 OFFICE O/P EST LOW 20 MIN: CPT | Mod: 95

## 2024-01-24 RX ORDER — TIRZEPATIDE 5 MG/.5ML
5 INJECTION, SOLUTION SUBCUTANEOUS
Qty: 4 | Refills: 5 | Status: ACTIVE | COMMUNITY
Start: 2024-01-24 | End: 1900-01-01

## 2024-01-24 NOTE — HISTORY OF PRESENT ILLNESS
[FreeTextEntry1] : 53 yo woman with past history of morbid obesity, dm2, s/p sleeve gastrectomy and 200 lb total weight loss returns for obesity medicine f/u  tolerating ozempic 2mg tolerating phentermine 37.5mg weight reamins at 235 lbs  u p1 lb in past 8 months weight loss in the 200 lb range   reports lifestyle generally unchanged appetite under reasonable control continues with home mini bike on phentermine and ozempic had vein surgery last week wants to know about switching from ozempic to mounjaro  otherwise without new complaints today

## 2024-01-24 NOTE — ASSESSMENT
[FreeTextEntry1] : BARIATRIC SURGERY HISTORY: gastric sleeve  OBESITY COMORBIDITIES: DM (resolved), HTN (resolved), HLD (resolved)  ANTI-OBESITY MEDICATIONS: bupropion (not currently taking), victoza (no longer taking), phentermine, ozempic  OBESITY MEDICATION SIDE EFFECTS: sweeling with topiramate  Plan:  Continue current food plan. with Increased fiber, reduce snacking.  Use bike, and continue walking as tolerated  cont phentermine 37.5  switch semaglutide to tirzepatide 5mg with dose increase after 1 month as tolerated   rtc in 1 month

## 2024-01-24 NOTE — PATIENT PROFILE ADULT. - PATIENT REPRESENTATIVE: ( YOU CAN CHOOSE ANY PERSON THAT CAN ASSIST YOU WITH YOUR HEALTH CARE PREFERENCES, DOES NOT HAVE TO BE A SPOUSE, IMMEDIATE FAMILY OR SIGNIFICANT OTHER/PARTNER)
Anesthesia Post-op Note    Patient: Rafiq Pruett  Procedure(s) Performed: CYSTOSCOPY, WITH BLADDER WALL INJECTION (Bladder)  Anesthesia type: MAC    Vitals Value Taken Time   Temp 36.5 °C (97.7 °F) 01/24/24 0850   Pulse 80 01/24/24 0850   Resp 20 01/24/24 0850   SpO2 96 % 01/24/24 0850   /71 01/24/24 0850         Patient Location: PACU Phase 1  Post-op Vital Signs:stable  Level of Consciousness: awake and alert  Respiratory Status: spontaneous ventilation  Cardiovascular stable  Hydration: euvolemic  Pain Management: adequately controlled  Handoff: Handoff to receiving clinician was performed and questions were answered  Vomiting: none  Nausea: None  Airway Patency:patent  Post-op Assessment: no complications, patient tolerated procedure well, no evidence of recall, dentition within defined limits, moving all extremities and No Corneal Abrasion      No notable events documented.                      
Declines

## 2024-01-25 DIAGNOSIS — I10 ESSENTIAL (PRIMARY) HYPERTENSION: ICD-10-CM

## 2024-02-12 ENCOUNTER — RX RENEWAL (OUTPATIENT)
Age: 54
End: 2024-02-12

## 2024-02-16 ENCOUNTER — LABORATORY RESULT (OUTPATIENT)
Age: 54
End: 2024-02-16

## 2024-02-20 RX ORDER — ADHESIVE TAPE 3"X 2.3 YD
50 MCG TAPE, NON-MEDICATED TOPICAL
Qty: 1 | Refills: 2 | Status: ACTIVE | COMMUNITY
Start: 2019-03-11 | End: 1900-01-01

## 2024-02-20 RX ORDER — HYDROCORTISONE 1 %
12 CREAM (GRAM) TOPICAL
Qty: 1 | Refills: 4 | Status: ACTIVE | COMMUNITY
Start: 2023-06-26 | End: 1900-01-01

## 2024-02-20 RX ORDER — GLUCOSA SU 2KCL/CHONDROITIN SU 500-400 MG
500 CAPSULE ORAL DAILY
Qty: 90 | Refills: 0 | Status: ACTIVE | COMMUNITY
Start: 1900-01-01 | End: 1900-01-01

## 2024-02-28 ENCOUNTER — OUTPATIENT (OUTPATIENT)
Dept: OUTPATIENT SERVICES | Facility: HOSPITAL | Age: 54
LOS: 1 days | End: 2024-02-28
Payer: MEDICARE

## 2024-02-28 ENCOUNTER — APPOINTMENT (OUTPATIENT)
Dept: BARIATRICS/WEIGHT MGMT | Facility: CLINIC | Age: 54
End: 2024-02-28
Payer: MEDICARE

## 2024-02-28 DIAGNOSIS — E66.01 MORBID (SEVERE) OBESITY DUE TO EXCESS CALORIES: ICD-10-CM

## 2024-02-28 DIAGNOSIS — Z90.721 ACQUIRED ABSENCE OF OVARIES, UNILATERAL: Chronic | ICD-10-CM

## 2024-02-28 DIAGNOSIS — I10 ESSENTIAL (PRIMARY) HYPERTENSION: ICD-10-CM

## 2024-02-28 PROCEDURE — G0463: CPT

## 2024-02-28 PROCEDURE — 99213 OFFICE O/P EST LOW 20 MIN: CPT | Mod: 95

## 2024-02-28 RX ORDER — TIRZEPATIDE 7.5 MG/.5ML
7.5 INJECTION, SOLUTION SUBCUTANEOUS
Qty: 1 | Refills: 5 | Status: ACTIVE | COMMUNITY
Start: 2024-02-28 | End: 1900-01-01

## 2024-02-28 NOTE — HISTORY OF PRESENT ILLNESS
[FreeTextEntry1] : 51 yo woman with past history of morbid obesity, dm2, s/p sleeve gastrectomy and 200 lb total weight loss returns for obesity medicine f/u  tolerating ozempic 2mg tolerating phentermine 37.5mg weight reamins up to 238 lbs (additional increase of 3 lbs weight loss in the 200 lb range   reports lifestyle generally unchanged appetite under reasonable control continues with home mini bike on phentermine and now tirzepatide which she is tolerating without incident has done well with vein surgery for legs  otherwise without new complaints today

## 2024-02-28 NOTE — ASSESSMENT
[FreeTextEntry1] : BARIATRIC SURGERY HISTORY: gastric sleeve OBESITY COMORBIDITIES: DM (resolved), HTN (resolved), HLD (resolved) ANTI-OBESITY MEDICATIONS: bupropion (not currently taking), victoza (no longer taking), phentermine, ozempic OBESITY MEDICATION SIDE EFFECTS: sweeling with topiramate  Plan:  Continue current food plan. with Increased fiber, reduce snacking. Use bike, and continue walking as tolerated cont phentermine 37.5 increase tirzepatide to 7.5 mg  rtc in 1 month

## 2024-03-04 DIAGNOSIS — E66.01 MORBID (SEVERE) OBESITY DUE TO EXCESS CALORIES: ICD-10-CM

## 2024-03-14 ENCOUNTER — LABORATORY RESULT (OUTPATIENT)
Age: 54
End: 2024-03-14

## 2024-03-18 RX ORDER — DICLOFENAC SODIUM 1% 10 MG/G
1 GEL TOPICAL
Qty: 100 | Refills: 2 | Status: ACTIVE | COMMUNITY
Start: 2021-12-03 | End: 1900-01-01

## 2024-03-18 RX ORDER — BLOOD SUGAR DIAGNOSTIC
STRIP MISCELLANEOUS 3 TIMES DAILY
Qty: 270 | Refills: 1 | Status: ACTIVE | COMMUNITY
Start: 2019-03-11 | End: 1900-01-01

## 2024-03-19 ENCOUNTER — LABORATORY RESULT (OUTPATIENT)
Age: 54
End: 2024-03-19

## 2024-04-02 ENCOUNTER — LABORATORY RESULT (OUTPATIENT)
Age: 54
End: 2024-04-02

## 2024-04-03 ENCOUNTER — APPOINTMENT (OUTPATIENT)
Dept: BARIATRICS/WEIGHT MGMT | Facility: CLINIC | Age: 54
End: 2024-04-03
Payer: MEDICARE

## 2024-04-03 ENCOUNTER — OUTPATIENT (OUTPATIENT)
Dept: OUTPATIENT SERVICES | Facility: HOSPITAL | Age: 54
LOS: 1 days | End: 2024-04-03
Payer: MEDICARE

## 2024-04-03 DIAGNOSIS — Z90.721 ACQUIRED ABSENCE OF OVARIES, UNILATERAL: Chronic | ICD-10-CM

## 2024-04-03 DIAGNOSIS — Z98.890 OTHER SPECIFIED POSTPROCEDURAL STATES: Chronic | ICD-10-CM

## 2024-04-03 PROCEDURE — 99213 OFFICE O/P EST LOW 20 MIN: CPT | Mod: 95

## 2024-04-03 PROCEDURE — G0463: CPT

## 2024-04-03 RX ORDER — TIRZEPATIDE 10 MG/.5ML
10 INJECTION, SOLUTION SUBCUTANEOUS
Qty: 1 | Refills: 5 | Status: ACTIVE | COMMUNITY
Start: 2024-04-03 | End: 1900-01-01

## 2024-04-03 NOTE — REASON FOR VISIT
[Home] : at home, [unfilled] , at the time of the visit. [Other Location: e.g. Home (Enter Location, City,State)___] : at [unfilled] [Patient] : the patient [Obesity] : obesity [Follow-Up Visit] : a follow-up visit for

## 2024-04-03 NOTE — ASSESSMENT
[FreeTextEntry1] : BARIATRIC SURGERY HISTORY: gastric sleeve OBESITY COMORBIDITIES: DM (resolved), HTN (resolved), HLD (resolved) ANTI-OBESITY MEDICATIONS: bupropion (not currently taking), victoza (no longer taking), phentermine, ozempic OBESITY MEDICATION SIDE EFFECTS: sweeling with topiramate  Plan:  Continue current food plan. with Increased fiber, reduce snacking. Use bike, and continue walking as tolerated cont phentermine 37.5 increase tirzepatide to 10 mg will talk post liver U/S - NAFLD vs low level med-induced inflammation  rtc in 1 month

## 2024-04-03 NOTE — HISTORY OF PRESENT ILLNESS
[FreeTextEntry1] : 53 yo woman with past history of morbid obesity, dm2, s/p sleeve gastrectomy and 200 lb total weight loss returns for obesity medicine f/u  tolerating ozempic 2mg tolerating phentermine 37.5mg weight reamins up to 237 lbs (down 1 lb since last visit) weight loss in the 200 lb range   reports lifestyle generally unchanged appetite under reasonable control continues with home mini bike on phentermine and now tirzepatide 7.5mg without side effect but not much effect at all.  has done well with vein surgery for legs  also worried about slight bump in transaminases - liver U/S pending no symptoms but does take a fair amount of meds including prn opioid with tylenol

## 2024-04-04 DIAGNOSIS — I10 ESSENTIAL (PRIMARY) HYPERTENSION: ICD-10-CM

## 2024-04-08 DIAGNOSIS — E66.9 OBESITY, UNSPECIFIED: ICD-10-CM

## 2024-04-08 DIAGNOSIS — R74.8 ABNORMAL LEVELS OF OTHER SERUM ENZYMES: ICD-10-CM

## 2024-04-11 ENCOUNTER — LABORATORY RESULT (OUTPATIENT)
Age: 54
End: 2024-04-11

## 2024-04-15 ENCOUNTER — APPOINTMENT (OUTPATIENT)
Dept: INTERNAL MEDICINE | Facility: CLINIC | Age: 54
End: 2024-04-15
Payer: MEDICARE

## 2024-04-15 VITALS
HEART RATE: 80 BPM | DIASTOLIC BLOOD PRESSURE: 77 MMHG | SYSTOLIC BLOOD PRESSURE: 120 MMHG | WEIGHT: 238 LBS | OXYGEN SATURATION: 96 % | BODY MASS INDEX: 40.63 KG/M2 | RESPIRATION RATE: 16 BRPM | HEIGHT: 64 IN

## 2024-04-15 DIAGNOSIS — R74.8 ABNORMAL LEVELS OF OTHER SERUM ENZYMES: ICD-10-CM

## 2024-04-15 PROCEDURE — 99214 OFFICE O/P EST MOD 30 MIN: CPT

## 2024-04-24 ENCOUNTER — APPOINTMENT (OUTPATIENT)
Dept: INTERNAL MEDICINE | Facility: CLINIC | Age: 54
End: 2024-04-24
Payer: MEDICARE

## 2024-04-24 DIAGNOSIS — R35.0 FREQUENCY OF MICTURITION: ICD-10-CM

## 2024-04-24 DIAGNOSIS — G89.4 CHRONIC PAIN SYNDROME: ICD-10-CM

## 2024-04-24 DIAGNOSIS — I26.99 OTHER PULMONARY EMBOLISM W/OUT ACUTE COR PULMONALE: ICD-10-CM

## 2024-04-24 PROCEDURE — 99214 OFFICE O/P EST MOD 30 MIN: CPT

## 2024-04-24 RX ORDER — CIPROFLOXACIN HYDROCHLORIDE 500 MG/1
500 TABLET, FILM COATED ORAL
Qty: 10 | Refills: 0 | Status: ACTIVE | COMMUNITY
Start: 2024-04-24 | End: 1900-01-01

## 2024-04-24 NOTE — REVIEW OF SYSTEMS
[Negative] : Heme/Lymph [FreeTextEntry8] : see hpi [FreeTextEntry9] : ambulatory dysfunction [de-identified] : chronic neuropathy b/l feet

## 2024-04-24 NOTE — HISTORY OF PRESENT ILLNESS
[Home] : at home, [unfilled] , at the time of the visit. [Medical Office: ([facility])___] : at the medical office located in  [Verbal consent obtained from patient] : the patient, [unfilled] [de-identified] : Ms. CONY HOUSTON is a 53-year-old female, ambulates with a cane, hx. of CHF, morbid obesity, s/p gastric sleeve sx (Nov 2017), has lost 200 lbs., DM II, hypothyroid, OA, chronic peripheral neuropathy, PE x2 (VTE), on chronic coumadin, ambulatory dysfunction secondary to diabetic neuropathy, seen in telemedicine for an acute visit  She c/o frequency in urination and pain and pressure when urinating. Symptoms started yesterday afternoon She takes Warfarin 10 mg Wednesday, Thursday, Friday and on Monday, Tuesday, Saturday, Sunday takes 7.5 mg.

## 2024-04-24 NOTE — ASSESSMENT
[FreeTextEntry1] :  Urinary frequency UA and urine culture ordered Start Cipro 500 mg twice daily Increase po fluids  Hx of PE X 2 (VTE) on Coumadin 10 mg Wednesday, Thursday, Friday  on Coumadin 7.5mg on Monday, Tuesday, Saturday, Sunday Last INR ( 4/11/24) 2.53  Obesity -S/P gastric sleeve surgery Nov 2017 cont current supplements  Weight management, Healthy food choices, and increasing physical activity as tolerated discussed  Diabetes- Diet control after bariatric surgery  cont low sugar/low carb diet we'll check HgA1c today  Chronic peripheral neuropathy, severe YVONNE, ambulatory dysfunction secondary to diabetic neuropathy cont Lyrica 75 mg TID  cont Percocet 5- 325 mg Every 6 hours as needed  Hypothyroidism cont Synthroid 137 mcg daily  HTN/HLD cont Enalapril 2,5 mg daily cont Atorvastatin 40 mg daily  GERD cont famotidine 20 g prn- renewed today

## 2024-05-09 ENCOUNTER — LABORATORY RESULT (OUTPATIENT)
Age: 54
End: 2024-05-09

## 2024-05-14 NOTE — ASSESSMENT
[FreeTextEntry1] : Follow up  Obesity -S/P gastric sleeve surgery Nov 2017 cont current supplements Weight management, Healthy food choices, and increasing physical activity as tolerated discussed  Diabetes- Diet control after bariatric surgery Reinforced the importance of following a low sugar/low carbohydrate diet  Chronic peripheral neuropathy, severe YVONNE, ambulatory dysfunction secondary to diabetic neuropathy, chronic pain syndrome cont Lyrica 75 mg TID cont Percocet 5- 325 mg Every 6 hours as needed  Hypothyroidism cont Synthroid 137 mcg daily  HTN cont Enalapril 2,5 mg daily Reinforced the importance of following a low sodium diet  HLD cont Atorvastatin 40 mg daily Reinforced the importance of following a low cholesterol/low fat diet  Hx of PE X 2 (VTE) on Coumadin 10 mg Wednesday, Thursday, Friday on Coumadin 7.5mg on Monday, Tuesday, Saturday, Sunday  GERD cont famotidine 20 g prn- renewed today  lab results reviewed and discussed with patient  Slightly elevated liver enzymes We'll recheck hepatic panel in one month  Hypothyroidism cont Synthroid 137 mcg daily  f/u in one month

## 2024-05-14 NOTE — ADDENDUM
[FreeTextEntry1] : I, Sabrina Echols, am scribing for and in the presence of Dr. Luly Farias, DO in the following sections HISTORY OF PRESENT ILLNESS, PAST MEDICAL/FAMILY/SOCIAL HISTORY; REVIEW OF SYSTEMS; VITAL SIGNS; PHYSICAL EXAM; ASSESSMENT/PLAN on 04/15/2024.   I personally performed the services described in the documentation, reviewed the documentation recorded by the scribe in my presence, and it accurately and completely records my words and actions.

## 2024-05-14 NOTE — HISTORY OF PRESENT ILLNESS
[de-identified] : Ms. CONY HOUSTON is a 53 year old female with a Hx of CHF, morbid obesity, s/p gastric sleeve sx (Nov 2017), has lost 200 lbs., DM II, hypothyroid, OA, chronic peripheral neuropathy, PE x2 (VTE), on chronic coumadin, ambulatory dysfunction secondary to diabetic neuropathy, presents for a follow up visit to discuss lab results.  She c/o numbness and pain in her feet, and states that she has issues with her balance. Reports compliance with taking her meds daily. Denies any SOB, CP, abdominal pain, N/V/D, headache, dizziness, or leg swelling.

## 2024-05-17 ENCOUNTER — APPOINTMENT (OUTPATIENT)
Dept: BARIATRICS/WEIGHT MGMT | Facility: CLINIC | Age: 54
End: 2024-05-17
Payer: MEDICARE

## 2024-05-17 ENCOUNTER — OUTPATIENT (OUTPATIENT)
Dept: OUTPATIENT SERVICES | Facility: HOSPITAL | Age: 54
LOS: 1 days | End: 2024-05-17
Payer: MEDICARE

## 2024-05-17 DIAGNOSIS — Z90.721 ACQUIRED ABSENCE OF OVARIES, UNILATERAL: Chronic | ICD-10-CM

## 2024-05-17 DIAGNOSIS — I10 ESSENTIAL (PRIMARY) HYPERTENSION: ICD-10-CM

## 2024-05-17 DIAGNOSIS — E66.9 OBESITY, UNSPECIFIED: ICD-10-CM

## 2024-05-17 DIAGNOSIS — Z98.890 OTHER SPECIFIED POSTPROCEDURAL STATES: Chronic | ICD-10-CM

## 2024-05-17 PROCEDURE — G0463: CPT

## 2024-05-17 PROCEDURE — 99213 OFFICE O/P EST LOW 20 MIN: CPT | Mod: 95

## 2024-05-17 RX ORDER — SEMAGLUTIDE 2.68 MG/ML
8 INJECTION, SOLUTION SUBCUTANEOUS
Qty: 3 | Refills: 1 | Status: DISCONTINUED | COMMUNITY
Start: 2022-05-31 | End: 2024-05-17

## 2024-05-21 ENCOUNTER — LABORATORY RESULT (OUTPATIENT)
Age: 54
End: 2024-05-21

## 2024-05-28 DIAGNOSIS — E66.9 OBESITY, UNSPECIFIED: ICD-10-CM

## 2024-05-29 RX ORDER — FOLIC ACID 1 MG/1
1 TABLET ORAL DAILY
Qty: 90 | Refills: 0 | Status: ACTIVE | COMMUNITY
Start: 2018-05-15 | End: 1900-01-01

## 2024-05-29 RX ORDER — OXYCODONE 5 MG/1
5 TABLET ORAL TWICE DAILY
Qty: 60 | Refills: 0 | Status: ACTIVE | COMMUNITY
Start: 1900-01-01 | End: 1900-01-01

## 2024-06-13 ENCOUNTER — LABORATORY RESULT (OUTPATIENT)
Age: 54
End: 2024-06-13

## 2024-06-14 ENCOUNTER — LABORATORY RESULT (OUTPATIENT)
Age: 54
End: 2024-06-14

## 2024-06-19 RX ORDER — PHENTERMINE HYDROCHLORIDE 37.5 MG/1
37.5 TABLET ORAL
Qty: 30 | Refills: 0 | Status: ACTIVE | COMMUNITY
Start: 2020-01-30 | End: 1900-01-01

## 2024-06-25 ENCOUNTER — APPOINTMENT (OUTPATIENT)
Dept: INTERNAL MEDICINE | Facility: CLINIC | Age: 54
End: 2024-06-25
Payer: MEDICARE

## 2024-06-25 VITALS
RESPIRATION RATE: 19 BRPM | DIASTOLIC BLOOD PRESSURE: 81 MMHG | HEART RATE: 96 BPM | HEIGHT: 64 IN | BODY MASS INDEX: 42.17 KG/M2 | SYSTOLIC BLOOD PRESSURE: 118 MMHG | WEIGHT: 247 LBS | OXYGEN SATURATION: 97 %

## 2024-06-25 PROCEDURE — 99214 OFFICE O/P EST MOD 30 MIN: CPT

## 2024-06-25 RX ORDER — DULOXETINE HYDROCHLORIDE 60 MG/1
60 CAPSULE, DELAYED RELEASE PELLETS ORAL
Qty: 90 | Refills: 1 | Status: ACTIVE | COMMUNITY
Start: 2020-02-21 | End: 1900-01-01

## 2024-06-25 RX ORDER — AMMONIUM LACTATE 12 %
12 CREAM (GRAM) TOPICAL TWICE DAILY
Qty: 1 | Refills: 3 | Status: ACTIVE | COMMUNITY
Start: 2017-01-03 | End: 1900-01-01

## 2024-06-25 RX ORDER — MUPIROCIN 20 MG/G
2 OINTMENT TOPICAL 3 TIMES DAILY
Qty: 1 | Refills: 3 | Status: ACTIVE | COMMUNITY
Start: 2017-01-11 | End: 1900-01-01

## 2024-07-11 ENCOUNTER — LABORATORY RESULT (OUTPATIENT)
Age: 54
End: 2024-07-11

## 2024-07-15 ENCOUNTER — RX RENEWAL (OUTPATIENT)
Age: 54
End: 2024-07-15

## 2024-07-15 RX ORDER — FERROUS GLUCONATE 324(38)MG
324 (38 FE) TABLET ORAL
Qty: 180 | Refills: 0 | Status: ACTIVE | COMMUNITY
Start: 2024-07-15 | End: 1900-01-01

## 2024-07-19 ENCOUNTER — OUTPATIENT (OUTPATIENT)
Dept: OUTPATIENT SERVICES | Facility: HOSPITAL | Age: 54
LOS: 1 days | End: 2024-07-19

## 2024-07-19 ENCOUNTER — APPOINTMENT (OUTPATIENT)
Dept: BARIATRICS/WEIGHT MGMT | Facility: CLINIC | Age: 54
End: 2024-07-19

## 2024-07-19 DIAGNOSIS — Z90.721 ACQUIRED ABSENCE OF OVARIES, UNILATERAL: Chronic | ICD-10-CM

## 2024-07-19 DIAGNOSIS — Z98.890 OTHER SPECIFIED POSTPROCEDURAL STATES: Chronic | ICD-10-CM

## 2024-07-19 DIAGNOSIS — I10 ESSENTIAL (PRIMARY) HYPERTENSION: ICD-10-CM

## 2024-07-19 PROCEDURE — G2211 COMPLEX E/M VISIT ADD ON: CPT | Mod: 95

## 2024-07-19 PROCEDURE — G0463: CPT

## 2024-07-19 PROCEDURE — 99213 OFFICE O/P EST LOW 20 MIN: CPT | Mod: 95

## 2024-07-19 RX ORDER — TIRZEPATIDE 15 MG/.5ML
15 INJECTION, SOLUTION SUBCUTANEOUS
Qty: 1 | Refills: 5 | Status: ACTIVE | COMMUNITY
Start: 2024-07-19 | End: 1900-01-01

## 2024-07-26 ENCOUNTER — RX RENEWAL (OUTPATIENT)
Age: 54
End: 2024-07-26

## 2024-08-08 ENCOUNTER — LABORATORY RESULT (OUTPATIENT)
Age: 54
End: 2024-08-08

## 2024-08-08 ENCOUNTER — APPOINTMENT (OUTPATIENT)
Dept: INTERNAL MEDICINE | Facility: CLINIC | Age: 54
End: 2024-08-08

## 2024-08-08 PROCEDURE — 99204 OFFICE O/P NEW MOD 45 MIN: CPT

## 2024-08-08 NOTE — PLAN
[FreeTextEntry1] : Follow up  Chronic peripheral neuropathy, severe YVONNE, ambulatory dysfunction secondary to diabetic neuropathy cont Lyrica 75 mg TID  cont duloxetine 60mg daily  cont oxycodone 5mg prn  Hx of PE X 2 (VTE) INR 3.6 today Hold Coumadin today   Coumadin 10 mg 4 t /week on Coumadin 7.5mg  3 t/week  We'll check INR in 1 week  HTN cont Enalapril 2.5 mg daily  Reinforced the importance of following a low sodium diet  Diabetes- Diet controlled after bariatric surgery, Obesity -S/P gastric sleeve surgery Nov 2017  cont mounjaro 15mg weekly cont low sugar/low carb diet cont current supplements weight management, Healthy food choices, and increasing physical activity (as tolerated) discussed  Hypothyroidism cont Synthroid 137 mcg daily  HLD cont Atorvastatin 40 mg daily Reinforced the importance of following a low cholesterol/low fat diet  GERD cont famotidine 20mg prn.  Blood work ordered. Follow up in one week for lab results

## 2024-08-08 NOTE — PHYSICAL EXAM
[No Acute Distress] : no acute distress [Well-Appearing] : well-appearing [Normal Sclera/Conjunctiva] : normal sclera/conjunctiva [Normal Outer Ear/Nose] : the outer ears and nose were normal in appearance [No JVD] : no jugular venous distention [No Lymphadenopathy] : no lymphadenopathy [Supple] : supple [Thyroid Normal, No Nodules] : the thyroid was normal and there were no nodules present [No Respiratory Distress] : no respiratory distress  [No Accessory Muscle Use] : no accessory muscle use [Clear to Auscultation] : lungs were clear to auscultation bilaterally [Normal Rate] : normal rate  [Regular Rhythm] : with a regular rhythm [Normal S1, S2] : normal S1 and S2 [No Murmur] : no murmur heard [No Carotid Bruits] : no carotid bruits [Pedal Pulses Present] : the pedal pulses are present [No Extremity Clubbing/Cyanosis] : no extremity clubbing/cyanosis [Soft] : abdomen soft [Non Tender] : non-tender [Non-distended] : non-distended [No Masses] : no abdominal mass palpated [Normal Bowel Sounds] : normal bowel sounds [Normal Posterior Cervical Nodes] : no posterior cervical lymphadenopathy [Normal Anterior Cervical Nodes] : no anterior cervical lymphadenopathy [No CVA Tenderness] : no CVA  tenderness [No Spinal Tenderness] : no spinal tenderness [No Joint Swelling] : no joint swelling [Grossly Normal Strength/Tone] : grossly normal strength/tone [No Rash] : no rash [No Focal Deficits] : no focal deficits [Deep Tendon Reflexes (DTR)] : deep tendon reflexes were 2+ and symmetric [Normal Affect] : the affect was normal [Normal Insight/Judgement] : insight and judgment were intact [Normal Oropharynx] : the oropharynx was normal [Normal TMs] : both tympanic membranes were normal [de-identified] : + b/l LE edema, + varicose veins b/l LE [de-identified] : ambulates with cane

## 2024-08-08 NOTE — END OF VISIT
[FreeTextEntry3] : Documented by Sabrina Echols acting as a scribe for Dr. Glover. 08/08/2024   All medical record entries made by the scribe were at my, Dr. Glover, direction and personally dictated by me on 08/08/2024. I have reviewed the chart and agree that the record accurately reflects my personal performance of the history, physical exam, assessment and plan. I have also personally directed, reviewed, and agreed with the chart.

## 2024-08-08 NOTE — HISTORY OF PRESENT ILLNESS
[de-identified] : Ms. CONY HOUSTON is a 53 year old female with Hx of CHF, morbid obesity, s/p gastric sleeve sx (Nov 2017), has lost 200 lbs., DM II, hypothyroid, OA, chronic peripheral neuropathy, PE x2 (VTE), who presents for a follow up visit and INR check  Pt states she is feeling well, except she is experiencing chronic neuropathy of her b/l lower extremities. Denies any SOB, CP, abdominal pain, N/V/D, headache, or dizziness. Reports compliance with taking her meds daily. Pt is on Mounjaro 15mg weekly, following with obesity specialist (Dr. Abel Miller).

## 2024-08-14 ENCOUNTER — LABORATORY RESULT (OUTPATIENT)
Age: 54
End: 2024-08-14

## 2024-08-15 ENCOUNTER — LABORATORY RESULT (OUTPATIENT)
Age: 54
End: 2024-08-15

## 2024-08-16 ENCOUNTER — LABORATORY RESULT (OUTPATIENT)
Age: 54
End: 2024-08-16

## 2024-08-18 ENCOUNTER — LABORATORY RESULT (OUTPATIENT)
Age: 54
End: 2024-08-18

## 2024-08-19 ENCOUNTER — LABORATORY RESULT (OUTPATIENT)
Age: 54
End: 2024-08-19

## 2024-08-26 ENCOUNTER — LABORATORY RESULT (OUTPATIENT)
Age: 54
End: 2024-08-26

## 2024-08-26 ENCOUNTER — RX RENEWAL (OUTPATIENT)
Age: 54
End: 2024-08-26

## 2024-09-04 NOTE — DIETITIAN INITIAL EVALUATION ADULT. - ETIOLOGY
? Patient's belongings returned current medical condition likely decreased physical activity and excess caloric intake

## 2024-09-12 ENCOUNTER — LABORATORY RESULT (OUTPATIENT)
Age: 54
End: 2024-09-12

## 2024-09-24 ENCOUNTER — APPOINTMENT (OUTPATIENT)
Dept: INTERNAL MEDICINE | Facility: CLINIC | Age: 54
End: 2024-09-24
Payer: MEDICARE

## 2024-09-24 ENCOUNTER — NON-APPOINTMENT (OUTPATIENT)
Age: 54
End: 2024-09-24

## 2024-09-24 VITALS
RESPIRATION RATE: 14 BRPM | BODY MASS INDEX: 40.63 KG/M2 | HEIGHT: 64 IN | OXYGEN SATURATION: 97 % | DIASTOLIC BLOOD PRESSURE: 74 MMHG | HEART RATE: 92 BPM | WEIGHT: 238 LBS | SYSTOLIC BLOOD PRESSURE: 116 MMHG

## 2024-09-24 DIAGNOSIS — Z01.818 ENCOUNTER FOR OTHER PREPROCEDURAL EXAMINATION: ICD-10-CM

## 2024-09-24 PROCEDURE — 99214 OFFICE O/P EST MOD 30 MIN: CPT

## 2024-09-24 PROCEDURE — 93000 ELECTROCARDIOGRAM COMPLETE: CPT

## 2024-09-24 NOTE — HISTORY OF PRESENT ILLNESS
[Sleep Apnea] : sleep apnea [No Adverse Anesthesia Reaction] : no adverse anesthesia reaction in self or family member [Diabetes] : diabetes [Aortic Stenosis] : no aortic stenosis [Atrial Fibrillation] : no atrial fibrillation [Coronary Artery Disease] : no coronary artery disease [Recent Myocardial Infarction] : no recent myocardial infarction [Implantable Device/Pacemaker] : no implantable device/pacemaker [Asthma] : no asthma [COPD] : no COPD [Smoker] : not a smoker [Chronic Anticoagulation] : no chronic anticoagulation [Chronic Kidney Disease] : no chronic kidney disease [FreeTextEntry1] : varicose vein surgery [FreeTextEntry2] : 9/26/24 [FreeTextEntry3] : Dr. Guido Benitez [FreeTextEntry4] : Ms. CONY HOUSTON is a 54 year old female with Hx of CHF, morbid obesity, s/p gastric sleeve surgery (Nov 2017), has lost 200 lbs., DM II, hypothyroid, OA, chronic peripheral neuropathy, PE x2 (VTE), who presents for preoperative medical clearance.   Pt states she is feeling well. Offers no complaints. Denies any SOB, CP, abdominal pain, N/V/D, headache, dizziness, or leg swelling. Reports compliance with taking her meds daily.

## 2024-09-24 NOTE — PHYSICAL EXAM
[No Acute Distress] : no acute distress [Well-Appearing] : well-appearing [Normal Sclera/Conjunctiva] : normal sclera/conjunctiva [Normal Outer Ear/Nose] : the outer ears and nose were normal in appearance [No JVD] : no jugular venous distention [No Lymphadenopathy] : no lymphadenopathy [Supple] : supple [No Respiratory Distress] : no respiratory distress  [No Accessory Muscle Use] : no accessory muscle use [Clear to Auscultation] : lungs were clear to auscultation bilaterally [Normal Rate] : normal rate  [Regular Rhythm] : with a regular rhythm [Normal S1, S2] : normal S1 and S2 [No Murmur] : no murmur heard [Pedal Pulses Present] : the pedal pulses are present [No Edema] : there was no peripheral edema [No Extremity Clubbing/Cyanosis] : no extremity clubbing/cyanosis [Soft] : abdomen soft [Non Tender] : non-tender [Non-distended] : non-distended [No Masses] : no abdominal mass palpated [Normal Posterior Cervical Nodes] : no posterior cervical lymphadenopathy [Normal Anterior Cervical Nodes] : no anterior cervical lymphadenopathy [No CVA Tenderness] : no CVA  tenderness [No Spinal Tenderness] : no spinal tenderness [No Joint Swelling] : no joint swelling [Grossly Normal Strength/Tone] : grossly normal strength/tone [No Rash] : no rash [Coordination Grossly Intact] : coordination grossly intact [No Focal Deficits] : no focal deficits [Normal Affect] : the affect was normal [Normal Insight/Judgement] : insight and judgment were intact [de-identified] : ambulates with cane

## 2024-09-24 NOTE — PHYSICAL EXAM
[No Acute Distress] : no acute distress [Well-Appearing] : well-appearing [Normal Sclera/Conjunctiva] : normal sclera/conjunctiva [Normal Outer Ear/Nose] : the outer ears and nose were normal in appearance [No JVD] : no jugular venous distention [No Lymphadenopathy] : no lymphadenopathy [Supple] : supple [No Respiratory Distress] : no respiratory distress  [No Accessory Muscle Use] : no accessory muscle use [Clear to Auscultation] : lungs were clear to auscultation bilaterally [Normal Rate] : normal rate  [Regular Rhythm] : with a regular rhythm [Normal S1, S2] : normal S1 and S2 [No Murmur] : no murmur heard [Pedal Pulses Present] : the pedal pulses are present [No Edema] : there was no peripheral edema [No Extremity Clubbing/Cyanosis] : no extremity clubbing/cyanosis [Soft] : abdomen soft [Non Tender] : non-tender [Non-distended] : non-distended [No Masses] : no abdominal mass palpated [Normal Posterior Cervical Nodes] : no posterior cervical lymphadenopathy [Normal Anterior Cervical Nodes] : no anterior cervical lymphadenopathy [No CVA Tenderness] : no CVA  tenderness [No Spinal Tenderness] : no spinal tenderness [No Joint Swelling] : no joint swelling [Grossly Normal Strength/Tone] : grossly normal strength/tone [No Rash] : no rash [Coordination Grossly Intact] : coordination grossly intact [No Focal Deficits] : no focal deficits [Normal Affect] : the affect was normal [Normal Insight/Judgement] : insight and judgment were intact [de-identified] : ambulates with cane

## 2024-09-24 NOTE — ADDENDUM
[FreeTextEntry1] : Documented by Sabrina Echols acting as a scribe for Dr. Luly Farias. 09/24/2024   All medical record entries made by the scribe were at my, Dr. Luly Farias, direction and personally dictated by me on 09/24/2024. I have reviewed the chart and agree that the record accurately reflects my personal performance of the history, physical exam, assessment and plan. I have also personally directed, reviewed, and agreed with the chart.

## 2024-09-25 LAB
ALBUMIN SERPL ELPH-MCNC: 4.1 G/DL
ALP BLD-CCNC: 91 U/L
ALT SERPL-CCNC: 49 U/L
ANION GAP SERPL CALC-SCNC: 12 MMOL/L
APTT BLD: 44 SEC
AST SERPL-CCNC: 46 U/L
BASOPHILS # BLD AUTO: 0.02 K/UL
BASOPHILS NFR BLD AUTO: 0.4 %
BILIRUB SERPL-MCNC: 0.3 MG/DL
BUN SERPL-MCNC: 18 MG/DL
CALCIUM SERPL-MCNC: 8.9 MG/DL
CHLORIDE SERPL-SCNC: 106 MMOL/L
CO2 SERPL-SCNC: 24 MMOL/L
CREAT SERPL-MCNC: 0.61 MG/DL
EGFR: 106 ML/MIN/1.73M2
EOSINOPHIL # BLD AUTO: 0.09 K/UL
EOSINOPHIL NFR BLD AUTO: 1.9 %
GLUCOSE SERPL-MCNC: 86 MG/DL
HCT VFR BLD CALC: 42.8 %
HGB BLD-MCNC: 13.5 G/DL
IMM GRANULOCYTES NFR BLD AUTO: 0.2 %
INR PPP: 2.42 RATIO
LYMPHOCYTES # BLD AUTO: 1.1 K/UL
LYMPHOCYTES NFR BLD AUTO: 23 %
MAN DIFF?: NORMAL
MCHC RBC-ENTMCNC: 28.3 PG
MCHC RBC-ENTMCNC: 31.5 GM/DL
MCV RBC AUTO: 89.7 FL
MONOCYTES # BLD AUTO: 0.48 K/UL
MONOCYTES NFR BLD AUTO: 10 %
NEUTROPHILS # BLD AUTO: 3.08 K/UL
NEUTROPHILS NFR BLD AUTO: 64.5 %
PLATELET # BLD AUTO: 184 K/UL
POTASSIUM SERPL-SCNC: 4.6 MMOL/L
PROT SERPL-MCNC: 7.1 G/DL
PT BLD: 28.5 SEC
RBC # BLD: 4.77 M/UL
RBC # FLD: 13.8 %
SODIUM SERPL-SCNC: 142 MMOL/L
WBC # FLD AUTO: 4.78 K/UL

## 2024-10-02 ENCOUNTER — APPOINTMENT (OUTPATIENT)
Dept: BARIATRICS/WEIGHT MGMT | Facility: CLINIC | Age: 54
End: 2024-10-02
Payer: MEDICARE

## 2024-10-02 DIAGNOSIS — E66.9 OBESITY, UNSPECIFIED: ICD-10-CM

## 2024-10-02 PROCEDURE — 99213 OFFICE O/P EST LOW 20 MIN: CPT | Mod: 95

## 2024-10-02 PROCEDURE — G2211 COMPLEX E/M VISIT ADD ON: CPT | Mod: 95

## 2024-10-02 NOTE — HISTORY OF PRESENT ILLNESS
[FreeTextEntry1] : 53 yo woman with past history of morbid obesity, dm2, s/p sleeve gastrectomy and 200 lb total weight loss returns for obesity medicine f/u  tolerating tirzepatide 15mg  tolerating phentermine 37.5mg weight has dropped to 235 lbs (had been up at physician's office but not consistent with home scale) weight loss in the 200 lb range  had varicose vein surgery last week - feels it ihas improved in terms of of remaining veins that were protruding after previous procedures reports lifestyle generally unchanged on tirzepatide 15mg and phentermine without incident ontinues added green tea and makes fresh akhil tea every day (MAFLD - AST/ALT still mildly elevated in 40s) continues with home mini bike   Otherwise without new complaints today.

## 2024-10-02 NOTE — ASSESSMENT
[FreeTextEntry1] : BARIATRIC SURGERY HISTORY: gastric sleeve OBESITY COMORBIDITIES: DM (resolved), HTN (resolved), HLD (resolved) ANTI-OBESITY MEDICATIONS: bupropion (not currently taking), victoza (no longer taking), ozempic (no longer taking) phentermine, mounjaro OBESITY MEDICATION SIDE EFFECTS: swelling with topiramate  Plan:  Continue current food plan. with Increased fiber, reduce snacking. Use bike, and continue walking as tolerated cont phentermine 37.5 cont tirzepatide 15mg   rtc in 8 weeks

## 2024-11-25 ENCOUNTER — NON-APPOINTMENT (OUTPATIENT)
Age: 54
End: 2024-11-25

## 2024-11-25 DIAGNOSIS — Z98.84 BARIATRIC SURGERY STATUS: ICD-10-CM

## 2024-11-29 ENCOUNTER — LABORATORY RESULT (OUTPATIENT)
Age: 54
End: 2024-11-29

## 2024-12-05 ENCOUNTER — APPOINTMENT (OUTPATIENT)
Dept: SURGERY | Facility: CLINIC | Age: 54
End: 2024-12-05

## 2024-12-06 RX ORDER — PHENTERMINE HYDROCHLORIDE 37.5 MG/1
37.5 TABLET ORAL
Qty: 30 | Refills: 0 | Status: ACTIVE | COMMUNITY
Start: 2024-12-06 | End: 1900-01-01

## 2024-12-10 ENCOUNTER — RX RENEWAL (OUTPATIENT)
Age: 54
End: 2024-12-10

## 2024-12-10 RX ORDER — TIRZEPATIDE 15 MG/.5ML
15 INJECTION, SOLUTION SUBCUTANEOUS
Qty: 2 | Refills: 5 | Status: ACTIVE | COMMUNITY
Start: 2024-12-10 | End: 1900-01-01

## 2024-12-12 ENCOUNTER — APPOINTMENT (OUTPATIENT)
Dept: SURGERY | Facility: CLINIC | Age: 54
End: 2024-12-12
Payer: MEDICARE

## 2024-12-12 VITALS
OXYGEN SATURATION: 99 % | HEART RATE: 84 BPM | HEIGHT: 64 IN | TEMPERATURE: 97.2 F | WEIGHT: 235.13 LBS | DIASTOLIC BLOOD PRESSURE: 74 MMHG | SYSTOLIC BLOOD PRESSURE: 111 MMHG | BODY MASS INDEX: 40.14 KG/M2 | RESPIRATION RATE: 16 BRPM

## 2024-12-12 DIAGNOSIS — Z98.84 BARIATRIC SURGERY STATUS: ICD-10-CM

## 2024-12-12 PROCEDURE — 99214 OFFICE O/P EST MOD 30 MIN: CPT

## 2024-12-27 ENCOUNTER — LABORATORY RESULT (OUTPATIENT)
Age: 54
End: 2024-12-27

## 2024-12-29 NOTE — ASSESSMENT
[FreeTextEntry1] : BARIATRIC SURGERY HISTORY: gastric sleeve\par \par OBESITY COMORBIDITIES: DM (resolved), HTN (resolved), HLD (resolved)\par \par ANTI-OBESITY MEDICATIONS: bupropion (not currently taking), victoza (no longer taking), phentermine, ozempic\par \par OBESITY MEDICATION SIDE EFFECTS: sweeling with topiramate\par \par  \par Plan: \par \par Continue current food plan. with Increased fiber, reduce snacking.  \par Use bike, and continue walking as tolerated\par cont phentermine 37.5\par continue semaglutide 2mg \par \par \par rtc in 1-2 months no dysuria, no frequency, and no hematuria.

## 2024-12-31 ENCOUNTER — APPOINTMENT (OUTPATIENT)
Dept: INTERNAL MEDICINE | Facility: CLINIC | Age: 54
End: 2024-12-31

## 2025-01-14 ENCOUNTER — LABORATORY RESULT (OUTPATIENT)
Age: 55
End: 2025-01-14

## 2025-01-23 ENCOUNTER — APPOINTMENT (OUTPATIENT)
Dept: INTERNAL MEDICINE | Facility: CLINIC | Age: 55
End: 2025-01-23
Payer: MEDICARE

## 2025-01-23 VITALS
WEIGHT: 235 LBS | SYSTOLIC BLOOD PRESSURE: 118 MMHG | TEMPERATURE: 98.1 F | OXYGEN SATURATION: 97 % | HEIGHT: 64 IN | HEART RATE: 86 BPM | DIASTOLIC BLOOD PRESSURE: 83 MMHG | RESPIRATION RATE: 16 BRPM | BODY MASS INDEX: 40.12 KG/M2

## 2025-01-23 DIAGNOSIS — Z00.00 ENCOUNTER FOR GENERAL ADULT MEDICAL EXAMINATION W/OUT ABNORMAL FINDINGS: ICD-10-CM

## 2025-01-23 PROCEDURE — G0444 DEPRESSION SCREEN ANNUAL: CPT | Mod: 59

## 2025-01-23 PROCEDURE — G0439: CPT

## 2025-01-24 LAB
25(OH)D3 SERPL-MCNC: 32.9 NG/ML
ALBUMIN SERPL ELPH-MCNC: 4.4 G/DL
ALP BLD-CCNC: 92 U/L
ALT SERPL-CCNC: 34 U/L
ANION GAP SERPL CALC-SCNC: 12 MMOL/L
APPEARANCE: CLEAR
AST SERPL-CCNC: 31 U/L
BILIRUB SERPL-MCNC: 0.2 MG/DL
BILIRUBIN URINE: NEGATIVE
BLOOD URINE: NEGATIVE
BUN SERPL-MCNC: 20 MG/DL
CALCIUM SERPL-MCNC: 9.5 MG/DL
CHLORIDE SERPL-SCNC: 103 MMOL/L
CHOLEST SERPL-MCNC: 156 MG/DL
CO2 SERPL-SCNC: 24 MMOL/L
COLOR: YELLOW
CREAT SERPL-MCNC: 0.57 MG/DL
EGFR: 108 ML/MIN/1.73M2
ESTIMATED AVERAGE GLUCOSE: 88 MG/DL
GLUCOSE QUALITATIVE U: NEGATIVE MG/DL
GLUCOSE SERPL-MCNC: 84 MG/DL
HBA1C MFR BLD HPLC: 4.7 %
HDLC SERPL-MCNC: 72 MG/DL
KETONES URINE: NEGATIVE MG/DL
LDLC SERPL CALC-MCNC: 69 MG/DL
LEUKOCYTE ESTERASE URINE: NEGATIVE
NITRITE URINE: NEGATIVE
NONHDLC SERPL-MCNC: 84 MG/DL
PH URINE: 6
POTASSIUM SERPL-SCNC: 4.2 MMOL/L
PROT SERPL-MCNC: 7.5 G/DL
PROTEIN URINE: NEGATIVE MG/DL
SODIUM SERPL-SCNC: 140 MMOL/L
SPECIFIC GRAVITY URINE: 1.03
TRIGL SERPL-MCNC: 80 MG/DL
TSH SERPL-ACNC: 0.58 UIU/ML
UROBILINOGEN URINE: 0.2 MG/DL

## 2025-01-31 ENCOUNTER — LABORATORY RESULT (OUTPATIENT)
Age: 55
End: 2025-01-31

## 2025-01-31 LAB
BASOPHILS # BLD AUTO: 0.04 K/UL
BASOPHILS NFR BLD AUTO: 0.9 %
EOSINOPHIL # BLD AUTO: 0.15 K/UL
EOSINOPHIL NFR BLD AUTO: 3.3 %
HCT VFR BLD CALC: 42 %
HGB BLD-MCNC: 13.1 G/DL
IMM GRANULOCYTES NFR BLD AUTO: 0.2 %
LYMPHOCYTES # BLD AUTO: 1.06 K/UL
LYMPHOCYTES NFR BLD AUTO: 23.2 %
MAN DIFF?: NORMAL
MCHC RBC-ENTMCNC: 28.5 PG
MCHC RBC-ENTMCNC: 31.2 G/DL
MCV RBC AUTO: 91.3 FL
MONOCYTES # BLD AUTO: 0.54 K/UL
MONOCYTES NFR BLD AUTO: 11.8 %
NEUTROPHILS # BLD AUTO: 2.76 K/UL
NEUTROPHILS NFR BLD AUTO: 60.6 %
PLATELET # BLD AUTO: 183 K/UL
RBC # BLD: 4.6 M/UL
RBC # FLD: 14.4 %
VIT B12 SERPL-MCNC: 987 PG/ML
WBC # FLD AUTO: 4.56 K/UL

## 2025-02-28 ENCOUNTER — LABORATORY RESULT (OUTPATIENT)
Age: 55
End: 2025-02-28

## 2025-03-12 ENCOUNTER — LABORATORY RESULT (OUTPATIENT)
Age: 55
End: 2025-03-12

## 2025-03-19 ENCOUNTER — LABORATORY RESULT (OUTPATIENT)
Age: 55
End: 2025-03-19

## 2025-03-20 ENCOUNTER — LABORATORY RESULT (OUTPATIENT)
Age: 55
End: 2025-03-20

## 2025-03-21 ENCOUNTER — LABORATORY RESULT (OUTPATIENT)
Age: 55
End: 2025-03-21

## 2025-03-24 ENCOUNTER — LABORATORY RESULT (OUTPATIENT)
Age: 55
End: 2025-03-24

## 2025-03-28 ENCOUNTER — LABORATORY RESULT (OUTPATIENT)
Age: 55
End: 2025-03-28

## 2025-04-25 ENCOUNTER — LABORATORY RESULT (OUTPATIENT)
Age: 55
End: 2025-04-25

## 2025-04-25 ENCOUNTER — RX RENEWAL (OUTPATIENT)
Age: 55
End: 2025-04-25

## 2025-05-20 ENCOUNTER — APPOINTMENT (OUTPATIENT)
Dept: INTERNAL MEDICINE | Facility: CLINIC | Age: 55
End: 2025-05-20

## 2025-05-20 VITALS
BODY MASS INDEX: 40.97 KG/M2 | DIASTOLIC BLOOD PRESSURE: 73 MMHG | RESPIRATION RATE: 18 BRPM | SYSTOLIC BLOOD PRESSURE: 107 MMHG | HEART RATE: 89 BPM | OXYGEN SATURATION: 96 % | HEIGHT: 64 IN | WEIGHT: 240 LBS

## 2025-05-20 DIAGNOSIS — E78.5 HYPERLIPIDEMIA, UNSPECIFIED: ICD-10-CM

## 2025-05-20 DIAGNOSIS — I26.99 OTHER PULMONARY EMBOLISM W/OUT ACUTE COR PULMONALE: ICD-10-CM

## 2025-05-20 DIAGNOSIS — E11.42 TYPE 2 DIABETES MELLITUS WITH DIABETIC POLYNEUROPATHY: ICD-10-CM

## 2025-05-20 DIAGNOSIS — E11.9 TYPE 2 DIABETES MELLITUS W/OUT COMPLICATIONS: ICD-10-CM

## 2025-05-20 DIAGNOSIS — I10 ESSENTIAL (PRIMARY) HYPERTENSION: ICD-10-CM

## 2025-05-20 DIAGNOSIS — E03.9 HYPOTHYROIDISM, UNSPECIFIED: ICD-10-CM

## 2025-05-20 PROCEDURE — 99214 OFFICE O/P EST MOD 30 MIN: CPT

## 2025-05-30 ENCOUNTER — LABORATORY RESULT (OUTPATIENT)
Age: 55
End: 2025-05-30

## 2025-05-31 LAB
ALBUMIN SERPL ELPH-MCNC: 4.1 G/DL
ALP BLD-CCNC: 83 U/L
ALT SERPL-CCNC: 42 U/L
ANION GAP SERPL CALC-SCNC: 13 MMOL/L
AST SERPL-CCNC: 42 U/L
BILIRUB SERPL-MCNC: 0.3 MG/DL
BUN SERPL-MCNC: 16 MG/DL
CALCIUM SERPL-MCNC: 8.4 MG/DL
CHLORIDE SERPL-SCNC: 107 MMOL/L
CHOLEST SERPL-MCNC: 142 MG/DL
CO2 SERPL-SCNC: 21 MMOL/L
CREAT SERPL-MCNC: 0.61 MG/DL
EGFRCR SERPLBLD CKD-EPI 2021: 106 ML/MIN/1.73M2
ESTIMATED AVERAGE GLUCOSE: 97 MG/DL
GLUCOSE SERPL-MCNC: 82 MG/DL
HBA1C MFR BLD HPLC: 5 %
HDLC SERPL-MCNC: 63 MG/DL
LDLC SERPL-MCNC: 65 MG/DL
NONHDLC SERPL-MCNC: 79 MG/DL
POTASSIUM SERPL-SCNC: 4.6 MMOL/L
PROT SERPL-MCNC: 7.2 G/DL
SODIUM SERPL-SCNC: 140 MMOL/L
TRIGL SERPL-MCNC: 73 MG/DL
TSH SERPL-ACNC: 0.82 UIU/ML

## 2025-06-16 NOTE — ED ADULT NURSE NOTE - NSFALLRSKPASTHIST_ED_ALL_ED
Subjective:      Josue Almeida, 32 y.o. female, who is here for further evaluation of elevated PTH levels.  Patient preferred name is Josue. She is here with her  Sanjiv and daughter Sumaya.   she was initially told has elevated PTH levels in June/2025.    It appears she had labs done at  on 6/9/2025 which showed  PTH elevated at 123 pg/mL  Calcium normal at 9.5  Of note she has had low calcium of 8.4 in April/2021.    Labs in 4/2025  TSH normal at 1.11  Calcium 9.3  Albumin 4.7  Creatinine 0.6 with GFR > 90    Looking at the records it appears they have had normal calcium at least since March/2011. She has had lower calcium levels in the past.  I do not see reports of 24-hour urinary studies.  Regards to hyperparathyroidism she denies history of fractures.  She does have recurrent falls because of balance issues.  Denies history of osteoporosis or having DXA scan in the past. She has kidney stones s/p removal one time in 2011. Also her dad and sisters have the same issue  CT Abdo/Pelvis WC June/2025 showed punctate non-obstructing left renal calculus.    she denies issues with malabsorption including celiac disease, history of gastric bypass surgery etc.    she is currently not on vitamin D or calcium supplements.  Not much dairy intake.  She did take prednisone 20 mg for 2 days-for a rash this week.          History of fractures:  Denies            Social History     Tobacco Use    Smoking status: Never    Smokeless tobacco: Never   Substance Use Topics    Alcohol use: No    Drug use: Yes     Types: Marijuana (Weed)     Comment: medical marijuana card      Lives with her  in [place], [place]. Its about 1 hour and 15 minutes away.  She uses medical marijuana and vapes.  Denies alcohol or IVDA use.  Doesn't work.    Family History   Problem Relation Age of Onset    Kidney Disease Mother     Crohn's Disease Father     Urolithiasis Father     Depression Father     Substance Abuse Father      no

## 2025-06-27 ENCOUNTER — LABORATORY RESULT (OUTPATIENT)
Age: 55
End: 2025-06-27

## 2025-06-30 ENCOUNTER — LABORATORY RESULT (OUTPATIENT)
Age: 55
End: 2025-06-30

## 2025-07-25 ENCOUNTER — LABORATORY RESULT (OUTPATIENT)
Age: 55
End: 2025-07-25

## 2025-08-04 ENCOUNTER — LABORATORY RESULT (OUTPATIENT)
Age: 55
End: 2025-08-04

## 2025-08-29 ENCOUNTER — LABORATORY RESULT (OUTPATIENT)
Age: 55
End: 2025-08-29

## 2025-09-02 ENCOUNTER — LABORATORY RESULT (OUTPATIENT)
Age: 55
End: 2025-09-02